# Patient Record
Sex: FEMALE | Race: OTHER | HISPANIC OR LATINO | Employment: FULL TIME | URBAN - METROPOLITAN AREA
[De-identification: names, ages, dates, MRNs, and addresses within clinical notes are randomized per-mention and may not be internally consistent; named-entity substitution may affect disease eponyms.]

---

## 2018-09-23 ENCOUNTER — APPOINTMENT (EMERGENCY)
Dept: RADIOLOGY | Facility: HOSPITAL | Age: 33
End: 2018-09-23
Payer: COMMERCIAL

## 2018-09-23 ENCOUNTER — HOSPITAL ENCOUNTER (EMERGENCY)
Facility: HOSPITAL | Age: 33
Discharge: HOME | End: 2018-09-23
Attending: EMERGENCY MEDICINE | Admitting: EMERGENCY MEDICINE
Payer: COMMERCIAL

## 2018-09-23 VITALS
RESPIRATION RATE: 20 BRPM | OXYGEN SATURATION: 100 % | HEART RATE: 70 BPM | BODY MASS INDEX: 59.2 KG/M2 | WEIGHT: 293 LBS | DIASTOLIC BLOOD PRESSURE: 59 MMHG | TEMPERATURE: 97.5 F | SYSTOLIC BLOOD PRESSURE: 113 MMHG

## 2018-09-23 DIAGNOSIS — R60.0 LOWER EXTREMITY EDEMA: Primary | ICD-10-CM

## 2018-09-23 DIAGNOSIS — J06.9 UPPER RESPIRATORY INFECTION: ICD-10-CM

## 2018-09-23 PROCEDURE — 99284 EMERGENCY DEPT VISIT MOD MDM: CPT

## 2018-09-23 PROCEDURE — 96372 THER/PROPH/DIAG INJ SC/IM: CPT

## 2018-09-23 PROCEDURE — 71046 X-RAY EXAM CHEST 2 VIEWS: CPT

## 2018-09-23 RX ORDER — BENZONATATE 100 MG/1
100 CAPSULE ORAL 3 TIMES DAILY PRN
Qty: 20 CAPSULE | Refills: 0 | Status: SHIPPED | OUTPATIENT
Start: 2018-09-23 | End: 2018-09-30

## 2018-09-23 RX ADMIN — ENOXAPARIN SODIUM 100 MG: 100 INJECTION SUBCUTANEOUS at 15:17

## 2018-09-23 RX ADMIN — ENOXAPARIN SODIUM 40 MG: 40 INJECTION SUBCUTANEOUS at 15:17

## 2018-09-23 NOTE — DISCHARGE INSTRUCTIONS
Leg Edema   WHAT YOU NEED TO KNOW:   Leg edema is swelling caused by fluid buildup  Your legs may swell if you sit or stand for long periods of time, are pregnant, or are injured  Swelling may also occur if you have heart failure or circulation problems  This means that your heart does not pump blood through your body as it should  DISCHARGE INSTRUCTIONS:   Self-care:   · Elevate your legs:  Raise your legs above the level of your heart as often as you can  This will help decrease swelling and pain  Prop your legs on pillows or blankets to keep them elevated comfortably  · Wear pressure stockings: These tight stockings put pressure on your legs to promote blood flow and prevent blood clots  Wear the stockings during the day  Do not wear them while you sleep  · Apply heat:  Heat helps decrease pain and swelling  Apply heat on the area for 20 to 30 minutes every 2 hours for as many days as directed  · Stay active:  Do not stand or sit for long periods of time  Ask your healthcare provider about the best exercise plan for you  · Eat healthy foods:  Healthy foods include fruits, vegetables, whole-grain breads, low-fat dairy products, beans, lean meats, and fish  Ask if you need to be on a special diet  Limit salt  Salt will make your body hold even more fluid  Follow up with your healthcare provider as directed:  Write down your questions so you remember to ask them during your visits  Contact your healthcare provider if:   · You have a fever or feel more tired than usual     · The veins in your legs look larger than usual  They may look full or bulging  · Your legs itch or feel heavy  · You have red or white areas or sores on your legs  The skin may also appear dimpled or have indentations  · You are gaining weight  · You have trouble moving your ankles  · The swelling does not go away, or other parts of your body swell      · You have questions or concerns about your condition or care   Return to the emergency department if:   · You cannot walk  · You feel faint or confused  · Your skin turns blue or gray  · Your leg feels warm, tender, and painful  It may be swollen and red  · You have chest pain or trouble breathing that is worse when you lie down  · You suddenly feel lightheaded and have trouble breathing  · You have new and sudden chest pain  You may have more pain when you take deep breaths or cough  You may also cough up blood  © 2017 [employer]  Information is for End User's use only and may not be sold, redistributed or otherwise used for commercial purposes  All illustrations and images included in CareNotes® are the copyrighted property of A [employer]  or Pradeep Bojorquez  The above information is an  only  It is not intended as medical advice for individual conditions or treatments  Talk to your doctor, nurse or pharmacist before following any medical regimen to see if it is safe and effective for you  Upper Respiratory Infection   WHAT YOU NEED TO KNOW:   An upper respiratory infection is also called the common cold  It is an infection that can affect your nose, throat, ears, and sinuses  For healthy people, the common cold is usually not serious and does not need special treatment  Cold symptoms are usually worst for the first 3 to 5 days  Most people get better in 7 to 14 days  You may continue to cough for 2 to 3 weeks  Colds are caused by viruses and do not get better with antibiotics  DISCHARGE INSTRUCTIONS:   Return to the emergency department if:   · You have chest pain or trouble breathing  Contact your healthcare provider if:   · You have a fever over 102ºF (39°C)  · Your sore throat gets worse or you see white or yellow spots in your throat  · Your symptoms get worse after 3 to 5 days or your cold is not better in 14 days  · You have a rash anywhere on your skin      · You have large, tender lumps in your neck  · You have thick, green or yellow drainage from your nose  · You cough up thick yellow, green, or bloody mucus  · You have vomiting for more than 24 hours and cannot keep fluids down  · You have a bad earache  · You have questions or concerns about your condition or care  Medicines: You may need any of the following:  · Decongestants  help reduce nasal congestion and help you breathe more easily  If you take decongestant pills, they may make you feel restless or cause problems with your sleep  Do not use decongestant sprays for more than a few days  · Cough suppressants  help reduce coughing  Ask your healthcare provider which type of cough medicine is best for you  · NSAIDs , such as ibuprofen, help decrease swelling, pain, and fever  NSAIDs can cause stomach bleeding or kidney problems in certain people  If you take blood thinner medicine, always ask your healthcare provider if NSAIDs are safe for you  Always read the medicine label and follow directions  · Acetaminophen  decreases pain and fever  It is available without a doctor's order  Ask how much to take and how often to take it  Follow directions  Read the labels of all other medicines you are using to see if they also contain acetaminophen, or ask your doctor or pharmacist  Acetaminophen can cause liver damage if not taken correctly  Do not use more than 4 grams (4,000 milligrams) total of acetaminophen in one day  · Take your medicine as directed  Contact your healthcare provider if you think your medicine is not helping or if you have side effects  Tell him or her if you are allergic to any medicine  Keep a list of the medicines, vitamins, and herbs you take  Include the amounts, and when and why you take them  Bring the list or the pill bottles to follow-up visits  Carry your medicine list with you in case of an emergency    Follow up with your healthcare provider as directed:  Write down your questions so you remember to ask them during your visits  Self-care:   · Rest as much as possible  Slowly start to do more each day  · Drink more liquids as directed  Liquids will help thin and loosen mucus so you can cough it up  Liquids will also help prevent dehydration  Liquids that help prevent dehydration include water, fruit juice, and broth  Do not drink liquids that contain caffeine  Caffeine can increase your risk for dehydration  Ask your healthcare provider how much liquid to drink each day  · Soothe a sore throat  Gargle with warm salt water  This helps your sore throat feel better  Make salt water by dissolving ¼ teaspoon salt in 1 cup warm water  You may also suck on hard candy or throat lozenges  You may use a sore throat spray  · Use a humidifier or vaporizer  Use a cool mist humidifier or a vaporizer to increase air moisture in your home  This may make it easier for you to breathe and help decrease your cough  · Use saline nasal drops as directed  These help relieve congestion  · Apply petroleum-based jelly around the outside of your nostrils  This can decrease irritation from blowing your nose  · Do not smoke  Nicotine and other chemicals in cigarettes and cigars can make your symptoms worse  They can also cause infections such as bronchitis or pneumonia  Ask your healthcare provider for information if you currently smoke and need help to quit  E-cigarettes or smokeless tobacco still contain nicotine  Talk to your healthcare provider before you use these products  Prevent spreading your cold to others:   · Try to stay away from other people during the first 2 to 3 days of your cold when it is more easily spread  · Do not share food or drinks  · Do not share hand towels with household members  · Wash your hands often, especially after you blow your nose  Turn away from other people and cover your mouth and nose with a tissue when you sneeze or cough         © 2017 [facility] Information is for End User's use only and may not be sold, redistributed or otherwise used for commercial purposes  All illustrations and images included in CareNotes® are the copyrighted property of A [employer] M , [employer]  or Pradeep Bojorquez  The above information is an  only  It is not intended as medical advice for individual conditions or treatments  Talk to your doctor, nurse or pharmacist before following any medical regimen to see if it is safe and effective for you

## 2018-09-23 NOTE — ED PROVIDER NOTES
History  Chief Complaint   Patient presents with    Cough     productive brown, right foot swelling and pain in left back and hand pain      47-year-old female presents to the emergency department with complaints of upper respiratory symptoms  States she has had a persistent cough with yellow and brown sputum over the past several days  Denies shortness of breath or chest pain  Also notes a sore throat and nasal congestion  Not currently taking anything over-the-counter for her symptoms  Also with concerns of right-sided foot swelling  States that her right foot and lower extremity have been intermittently swollen over the past month  States that symptoms sometimes seem to be better in the morning  Denies injury to this extremity  History provided by:  Patient   used: No    Cough   Cough characteristics:  Productive  Sputum characteristics:  Yellow and brown  Severity:  Mild  Onset quality:  Gradual  Duration:  2 days  Timing:  Intermittent  Progression:  Waxing and waning  Chronicity:  New  Smoker: yes    Context: upper respiratory infection    Context: not animal exposure, not exposure to allergens, not fumes, not occupational exposure, not sick contacts, not smoke exposure, not weather changes and not with activity    Relieved by:  Nothing  Ineffective treatments:  None tried  Associated symptoms: no chest pain, no chills, no diaphoresis, no ear fullness, no ear pain, no eye discharge, no fever, no headaches, no myalgias, no rash, no rhinorrhea, no shortness of breath, no sinus congestion, no sore throat, no weight loss and no wheezing        None       History reviewed  No pertinent past medical history  Past Surgical History:   Procedure Laterality Date     SECTION      CHOLECYSTECTOMY      TONSILLECTOMY         History reviewed  No pertinent family history  I have reviewed and agree with the history as documented      Social History   Substance Use Topics    Smoking status: Current Every Day Smoker     Packs/day: 0 50     Types: Cigarettes    Smokeless tobacco: Never Used    Alcohol use Yes      Comment: occassionally        Review of Systems   Constitutional: Negative for activity change, appetite change, chills, diaphoresis, fever and weight loss  HENT: Positive for congestion  Negative for dental problem, drooling, ear discharge, ear pain, mouth sores, nosebleeds, rhinorrhea, sore throat and trouble swallowing  Eyes: Negative for pain, discharge and itching  Respiratory: Positive for cough  Negative for chest tightness, shortness of breath and wheezing  Cardiovascular: Negative for chest pain and palpitations  Gastrointestinal: Negative for abdominal pain, blood in stool, constipation, diarrhea, nausea and vomiting  Endocrine: Negative for cold intolerance and heat intolerance  Genitourinary: Negative for difficulty urinating, dysuria, flank pain, frequency and urgency  Musculoskeletal: Negative for myalgias  Foot and leg swelling   Skin: Negative for rash and wound  Allergic/Immunologic: Negative for food allergies and immunocompromised state  Neurological: Negative for dizziness, seizures, syncope, weakness, numbness and headaches  Psychiatric/Behavioral: Negative for agitation, behavioral problems and confusion  Physical Exam  Physical Exam   Constitutional: She is oriented to person, place, and time  She appears well-developed and well-nourished  No distress  HENT:   Head: Normocephalic and atraumatic  Right Ear: External ear normal    Left Ear: External ear normal    Mouth/Throat: Oropharynx is clear and moist  No oropharyngeal exudate  Eyes: Conjunctivae are normal    Neck: No JVD present  No tracheal deviation present  Cardiovascular: Normal rate, regular rhythm and normal heart sounds  Exam reveals no gallop and no friction rub  No murmur heard    Pulmonary/Chest: Effort normal and breath sounds normal  No respiratory distress  She has no wheezes  She has no rales  She exhibits no tenderness  Abdominal: Soft  Bowel sounds are normal  She exhibits no distension  There is no tenderness  There is no guarding  Musculoskeletal: Normal range of motion  She exhibits no tenderness or deformity  Right lower leg: She exhibits swelling and edema (trace)  She exhibits no tenderness, no bony tenderness, no deformity and no laceration  Lymphadenopathy:     She has no cervical adenopathy  Neurological: She is alert and oriented to person, place, and time  Skin: Skin is warm and dry  No rash noted  She is not diaphoretic  No erythema  Psychiatric: She has a normal mood and affect  Her behavior is normal    Nursing note and vitals reviewed        Vital Signs  ED Triage Vitals   Temperature Pulse Respirations Blood Pressure SpO2   09/23/18 1214 09/23/18 1214 09/23/18 1214 09/23/18 1227 09/23/18 1214   97 5 °F (36 4 °C) 70 20 113/59 100 %      Temp Source Heart Rate Source Patient Position - Orthostatic VS BP Location FiO2 (%)   09/23/18 1214 09/23/18 1214 09/23/18 1214 09/23/18 1214 --   Temporal Monitor Sitting Left arm       Pain Score       09/23/18 1214       8           Vitals:    09/23/18 1214 09/23/18 1227   BP:  113/59   Pulse: 70    Patient Position - Orthostatic VS: Sitting        Visual Acuity      ED Medications  Medications   enoxaparin (LOVENOX) subcutaneous injection 100 mg (not administered)   enoxaparin (LOVENOX) subcutaneous injection 40 mg (not administered)       Diagnostic Studies  Results Reviewed     None                 XR chest 2 views   ED Interpretation by Jenny Santillan PA-C (09/23 1327)   No focal consolidation      VAS follow up    (Results Pending)              Procedures  Procedures       Phone Contacts  ED Phone Contact    ED Course                               MDM  Number of Diagnoses or Management Options  Lower extremity edema:   Upper respiratory infection:   Diagnosis management comments: Differential diagnosis includes but not limited to:  Upper respiratory infection, bronchitis, pneumonia, dependent edema  DVT less likely  Amount and/or Complexity of Data Reviewed  Tests in the radiology section of CPT®: ordered and reviewed  Independent visualization of images, tracings, or specimens: yes      CritCare Time    Disposition  Final diagnoses:   Lower extremity edema   Upper respiratory infection     Time reflects when diagnosis was documented in both MDM as applicable and the Disposition within this note     Time User Action Codes Description Comment    9/23/2018  1:45 PM Shannon Ocasio Add [R60 0] Lower extremity edema     9/23/2018  1:46 PM Willie VILLEGAS Add [J06 9] Upper respiratory infection       ED Disposition     ED Disposition Condition Comment    Discharge  Soraya Smart discharge to home/self care  Condition at discharge: Stable        Follow-up Information     Follow up With Specialties Details Why [address] Schedule an appointment as soon as possible for a visit  [phone] [place] [address] 43             Patient's Medications   Discharge Prescriptions    BENZONATATE (TESSALON PERLES) 100 MG CAPSULE    Take 1 capsule (100 mg total) by mouth 3 (three) times a day as needed for cough for up to 7 days       Start Date: 9/23/2018 End Date: 9/30/2018       Order Dose: 100 mg       Quantity: 20 capsule    Refills: 0       Outpatient Discharge Orders  VAS follow up   Standing Status: Future  Standing Exp   Date: 09/26/22         ED Provider  Electronically Signed by           Dwayne Camarena PA-C  09/23/18 2480

## 2018-10-24 ENCOUNTER — HOSPITAL ENCOUNTER (EMERGENCY)
Facility: HOSPITAL | Age: 33
Discharge: HOME | End: 2018-10-24
Attending: EMERGENCY MEDICINE | Admitting: EMERGENCY MEDICINE
Payer: COMMERCIAL

## 2018-10-24 VITALS
TEMPERATURE: 97.2 F | WEIGHT: 293 LBS | HEIGHT: 60 IN | BODY MASS INDEX: 57.52 KG/M2 | DIASTOLIC BLOOD PRESSURE: 70 MMHG | OXYGEN SATURATION: 98 % | HEART RATE: 78 BPM | RESPIRATION RATE: 16 BRPM | SYSTOLIC BLOOD PRESSURE: 133 MMHG

## 2018-10-24 DIAGNOSIS — S29.019A STRAIN OF THORACIC REGION, INITIAL ENCOUNTER: ICD-10-CM

## 2018-10-24 DIAGNOSIS — M62.830 SPASM OF THORACIC BACK MUSCLE: ICD-10-CM

## 2018-10-24 DIAGNOSIS — J40 BRONCHITIS: Primary | ICD-10-CM

## 2018-10-24 PROCEDURE — 99282 EMERGENCY DEPT VISIT SF MDM: CPT

## 2018-10-24 RX ORDER — ALBUTEROL SULFATE 90 UG/1
2 AEROSOL, METERED RESPIRATORY (INHALATION) EVERY 4 HOURS PRN
Qty: 1 INHALER | Refills: 0 | Status: SHIPPED | OUTPATIENT
Start: 2018-10-24 | End: 2020-08-25 | Stop reason: SDUPTHER

## 2018-10-24 RX ORDER — AZITHROMYCIN 250 MG/1
TABLET, FILM COATED ORAL
Qty: 6 TABLET | Refills: 0 | Status: SHIPPED | OUTPATIENT
Start: 2018-10-24 | End: 2018-10-28

## 2018-10-24 RX ORDER — BACLOFEN 10 MG/1
10 TABLET ORAL ONCE
Status: COMPLETED | OUTPATIENT
Start: 2018-10-24 | End: 2018-10-24

## 2018-10-24 RX ORDER — BACLOFEN 10 MG/1
10 TABLET ORAL 3 TIMES DAILY
Qty: 15 TABLET | Refills: 0 | Status: SHIPPED | OUTPATIENT
Start: 2018-10-24 | End: 2020-08-25 | Stop reason: ALTCHOICE

## 2018-10-24 RX ADMIN — BACLOFEN 10 MG: 10 TABLET ORAL at 22:48

## 2018-10-25 NOTE — ED PROVIDER NOTES
History  Chief Complaint   Patient presents with    Nasal Congestion     pt states shes been experiencing chest and nasal congestion for two weeks  Pt states she also may have pulled a muscle in her side and would like to have that looked at too  26 old female presenting with cough for 3 weeks and nasal congestion  Patient reports using sisters Advair Diskus at home with relief  She reports she has been having a productive cough of green sputum but denies hemoptysis  Denies any shortness breath or chest pain  Denies any abdominal pain nausea vomiting diarrhea fevers chills or sweats  In addition 3 days ago patient reports she was trying to lift her 400 lb mother up out of the wheelchair and felt a sharp pain in her back at the thoracic level  Patient reports trying Motrin and Tylenol at home without relief  She denies anything making the pain better and states she can never find a comfortable position at night to sleep which makes the pain worse  None       History reviewed  No pertinent past medical history  Past Surgical History:   Procedure Laterality Date     SECTION      five    CHOLECYSTECTOMY      TONSILLECTOMY         History reviewed  No pertinent family history  I have reviewed and agree with the history as documented  Social History   Substance Use Topics    Smoking status: Current Every Day Smoker     Packs/day: 0 50     Types: Cigarettes    Smokeless tobacco: Never Used    Alcohol use Yes      Comment: occassionally        Review of Systems   All other systems reviewed and are negative  Physical Exam  Physical Exam   Constitutional: She is oriented to person, place, and time  She appears well-developed and well-nourished  No distress  HENT:   Head: Normocephalic and atraumatic  Right Ear: External ear normal    Left Ear: External ear normal    Mouth/Throat: Oropharynx is clear and moist  No oropharyngeal exudate     Clear nasal discharge   Eyes: Conjunctivae are normal    EOM grossly intact   Neck: Normal range of motion  Neck supple  No JVD present  Cardiovascular: Normal rate  Pulmonary/Chest: Effort normal and breath sounds normal    No wheezing, good air movement   Abdominal: Soft  Musculoskeletal:        Arms:  FROM, steady gait, cap refill brisk, strength and sensation grossly intact throughout   Lymphadenopathy:     She has no cervical adenopathy  Neurological: She is alert and oriented to person, place, and time  Skin: Skin is warm and dry  Capillary refill takes less than 2 seconds  Psychiatric: She has a normal mood and affect  Her behavior is normal    Nursing note and vitals reviewed        Vital Signs  ED Triage Vitals [10/24/18 2215]   Temperature Pulse Respirations Blood Pressure SpO2   (!) 97 2 °F (36 2 °C) 78 16 133/70 98 %      Temp Source Heart Rate Source Patient Position - Orthostatic VS BP Location FiO2 (%)   Tympanic Monitor Sitting Right arm --      Pain Score       --           Vitals:    10/24/18 2215   BP: 133/70   Pulse: 78   Patient Position - Orthostatic VS: Sitting       Visual Acuity      ED Medications  Medications   baclofen tablet 10 mg (10 mg Oral Given 10/24/18 2248)       Diagnostic Studies  Results Reviewed     None                 No orders to display              Procedures  Procedures       Phone Contacts  ED Phone Contact    ED Course                               MDM  Number of Diagnoses or Management Options  Bronchitis:   Spasm of thoracic back muscle:   Strain of thoracic region, initial encounter:   Diagnosis management comments: 38-year-old presenting with cough for 3 weeks nasal congestion, will treat for bronchitis with azithromycin and sent home with albuterol inhalers patient found relief with Advair Diskus at home, patient no respiratory distress no wheezing on physical exam she appears well and nontoxic  Patient also complaining with right thoracic muscle strain/spasm, instructed patient to continue Motrin Tylenol home or get back within as needed to take at night for relief, will need to follow up with pcp and ortho     strict return to ED precautions discussed  Pt verbalizes understanding and agrees with plan  Pt is stable for discharge    Portions of the record may have been created with voice recognition software  Occasional wrong word or "sound a like" substitutions may have occurred due to the inherent limitations of voice recognition software  Read the chart carefully and recognize, using context, where substitutions have occurred  CritCare Time    Disposition  Final diagnoses:   Bronchitis   Strain of thoracic region, initial encounter   Spasm of thoracic back muscle     Time reflects when diagnosis was documented in both MDM as applicable and the Disposition within this note     Time User Action Codes Description Comment    10/24/2018 10:40 PM Madelaine Smart Add [J40] Bronchitis     10/24/2018 10:40 PM Madelaine Smart Add [S29 019A] Strain of thoracic region, initial encounter     10/24/2018 10:40 PM Madelaine Smart Add [M62 830] Spasm of thoracic back muscle       ED Disposition     ED Disposition Condition Comment    Discharge  Raisa Xiong discharge to home/self care      Condition at discharge: Good        Follow-up Information     Follow up With Specialties Details Why Contact Info    Mo Oneil MD Family Medicine Schedule an appointment as soon as possible for a visit As needed [address]            Discharge Medication List as of 10/24/2018 10:42 PM      START taking these medications    Details   albuterol (PROVENTIL HFA,VENTOLIN HFA) 90 mcg/act inhaler Inhale 2 puffs every 4 (four) hours as needed for wheezing, Starting Wed 10/24/2018, Print      azithromycin (ZITHROMAX) 250 mg tablet Take 2 tablets today then 1 tablet daily x 4 days, Print      baclofen 10 mg tablet Take 1 tablet (10 mg total) by mouth 3 (three) times a day, Starting Wed 10/24/2018, Print           No discharge procedures on file      ED Provider  Electronically Signed by           Augusto Gagnon PA-C  10/24/18 1012

## 2018-10-25 NOTE — DISCHARGE INSTRUCTIONS
Acute Bronchitis   WHAT YOU NEED TO KNOW:   Acute bronchitis is swelling and irritation in the air passages of your lungs  This irritation may cause you to cough or have other breathing problems  Acute bronchitis often starts because of another illness, such as a cold or the flu  The illness spreads from your nose and throat to your windpipe and airways  Bronchitis is often called a chest cold  Acute bronchitis lasts about 3 to 6 weeks and is usually not a serious illness  Your cough can last for several weeks  DISCHARGE INSTRUCTIONS:   Return to the emergency department if:   · You cough up blood  · Your lips or fingernails turn blue  · You feel like you are not getting enough air when you breathe  Contact your healthcare provider if:   · You have a fever  · Your breathing problems do not go away or get worse  · Your cough does not get better within 4 weeks  · You have questions or concerns about your condition or care  Self-care:   · Get more rest   Rest helps your body to heal  Slowly start to do more each day  Rest when you feel it is needed  · Avoid irritants in the air  Avoid chemicals, fumes, and dust  Wear a face mask if you must work around dust or fumes  Stay inside on days when air pollution levels are high  If you have allergies, stay inside when pollen counts are high  Do not use aerosol products, such as spray-on deodorant, bug spray, and hair spray  · Do not smoke or be around others who smoke  Nicotine and other chemicals in cigarettes and cigars damages the cilia that move mucus out of your lungs  Ask your healthcare provider for information if you currently smoke and need help to quit  E-cigarettes or smokeless tobacco still contain nicotine  Talk to your healthcare provider before you use these products  · Drink liquids as directed  Liquids help keep your air passages moist and help you cough up mucus   You may need to drink more liquids when you have acute bronchitis  Ask how much liquid to drink each day and which liquids are best for you  · Use a humidifier or vaporizer  Use a cool mist humidifier or a vaporizer to increase air moisture in your home  This may make it easier for you to breathe and help decrease your cough  Decrease risk for acute bronchitis:   · Get the vaccinations you need  Ask your healthcare provider if you should get vaccinated against the flu or pneumonia  · Prevent the spread of germs  You can decrease your risk of acute bronchitis and other illnesses by doing the following:     [facility] [place] [facility] your hands often with soap and water  Carry germ-killing hand lotion or gel with you  You can use the lotion or gel to clean your hands when soap and water are not available  ¨ Do not touch your eyes, nose, or mouth unless you have washed your hands first     ¨ Always cover your mouth when you cough to prevent the spread of germs  It is best to cough into a tissue or your shirt sleeve instead of into your hand  Ask those around you cover their mouths when they cough  ¨ Try to avoid people who have a cold or the flu  If you are sick, stay away from others as much as possible  Medicines: Your healthcare provider may  give you any of the following:  · Ibuprofen or acetaminophen  are medicines that help lower your fever  They are available without a doctor's order  Ask your healthcare provider which medicine is right for you  Ask how much to take and how often to take it  Follow directions  These medicines can cause stomach bleeding if not taken correctly  Ibuprofen can cause kidney damage  Do not take ibuprofen if you have kidney disease, an ulcer, or allergies to aspirin  Acetaminophen can cause liver damage  Do not take more than 4,000 milligrams in 24 hours  · Decongestants  help loosen mucus in your lungs and make it easier to cough up  This can help you breathe easier  · Cough suppressants  decrease your urge to cough   If your cough produces mucus, do not take a cough suppressant unless your healthcare provider tells you to  Your healthcare provider may suggest that you take a cough suppressant at night so you can rest     · Inhalers  may be given  Your healthcare provider may give you one or more inhalers to help you breathe easier and cough less  An inhaler gives your medicine to open your airways  Ask your healthcare provider to show you how to use your inhaler correctly  · Take your medicine as directed  Contact your healthcare provider if you think your medicine is not helping or if you have side effects  Tell him of her if you are allergic to any medicine  Keep a list of the medicines, vitamins, and herbs you take  Include the amounts, and when and why you take them  Bring the list or the pill bottles to follow-up visits  Carry your medicine list with you in case of an emergency  Follow up with your healthcare provider as directed:  Write down questions you have so you will remember to ask them during your follow-up visits  © 2017 [address] [employer] Information is for End User's use only and may not be sold, redistributed or otherwise used for commercial purposes  All illustrations and images included in CareNotes® are the copyrighted property of [employer]  or Pradeep Bojorquez  The above information is an  only  It is not intended as medical advice for individual conditions or treatments  Talk to your doctor, nurse or pharmacist before following any medical regimen to see if it is safe and effective for you  Muscle Spasm   WHAT YOU NEED TO KNOW:   A muscle spasm is a sudden contraction of any muscle or group of muscles  A muscle cramp is a painful muscle spasm  Muscle cramps commonly occur after intense exercise or during pregnancy  They may also be caused by certain medications, dehydration, low calcium or magnesium levels, or another medical condition     DISCHARGE INSTRUCTIONS: Medicines: You may need the following:  · NSAIDs  help decrease swelling and pain or fever  This medicine is available with or without a doctor's order  NSAIDs can cause stomach bleeding or kidney problems in certain people  If you take blood thinner medicine, always ask your healthcare provider if NSAIDs are safe for you  Always read the medicine label and follow directions  · Take your medicine as directed  Contact your healthcare provider if you think your medicine is not helping or if you have side effects  Tell him of her if you are allergic to any medicine  Keep a list of the medicines, vitamins, and herbs you take  Include the amounts, and when and why you take them  Bring the list or the pill bottles to follow-up visits  Carry your medicine list with you in case of an emergency  Follow up with your healthcare provider as directed: You may need other tests or treatment  You may also be referred to a physical therapist or other specialist  Write down your questions so you remember to ask them during your visits  Self-care:   · Stretch  your muscle to help relieve the cramp  It may be helpful to keep your muscle in the stretched position until the cramp is gone  · Apply heat  to help decrease pain and muscle spasms  Apply heat on the area for 20 to 30 minutes every 2 hours for as many days as directed  · Apply ice  to help decrease swelling and pain  Ice may also help prevent tissue damage  Use an ice pack, or put crushed ice in a plastic bag  Cover it with a towel and place it on your muscle for 15 to 20 minutes every hour or as directed  · Drink more liquids  to help prevent muscle cramps caused by dehydration  Sports drinks may help replace electrolytes you lose through sweat during exercise  Ask your healthcare provider how much liquid to drink each day and which liquids are best for you       · Eat healthy foods , such as fruits, vegetables, whole grains, low-fat dairy products, and lean proteins (meat, beans, and fish)  If you are pregnant, ask your healthcare provider about foods that are high in magnesium and sodium  They may help to relieve cramps during pregnancy  · Massage your muscle  to help relieve the cramp  · Take frequent deep breaths  until the cramp feels better  Lie down while you take the deep breaths so you do not get dizzy or lightheaded  Contact your healthcare provider if:   · You have signs of dehydration, such as a headache, dark yellow urine, dry eyes or mouth, or a fast heartbeat  · You have questions or concerns about your condition or care  Return to the emergency department if:   · You have warmth, swelling, or redness in the cramping muscle  · You have frequent or unrelieved muscle cramps in several different muscles  · You have muscle cramps with numbness, tingling, and burning in your hands and feet  © 2017 [facility] Information is for End User's use only and may not be sold, redistributed or otherwise used for commercial purposes  All illustrations and images included in CareNotes® are the copyrighted property of [employer]  or Pradeep Bojorquez  The above information is an  only  It is not intended as medical advice for individual conditions or treatments  Talk to your doctor, nurse or pharmacist before following any medical regimen to see if it is safe and effective for you

## 2019-06-01 ENCOUNTER — HOSPITAL ENCOUNTER (EMERGENCY)
Facility: HOSPITAL | Age: 34
Discharge: HOME | End: 2019-06-01
Attending: EMERGENCY MEDICINE
Payer: COMMERCIAL

## 2019-06-01 VITALS
BODY MASS INDEX: 58.22 KG/M2 | OXYGEN SATURATION: 98 % | TEMPERATURE: 98 F | DIASTOLIC BLOOD PRESSURE: 70 MMHG | HEART RATE: 86 BPM | SYSTOLIC BLOOD PRESSURE: 124 MMHG | WEIGHT: 293 LBS | RESPIRATION RATE: 18 BRPM

## 2019-06-01 DIAGNOSIS — S41.151A DOG BITE OF ARM, RIGHT, INITIAL ENCOUNTER: Primary | ICD-10-CM
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
## 2019-06-01 PROCEDURE — 99283 EMERGENCY DEPT VISIT LOW MDM: CPT | Performed by: PHYSICIAN ASSISTANT

## 2019-06-01 PROCEDURE — 90715 TDAP VACCINE 7 YRS/> IM: CPT | Performed by: PHYSICIAN ASSISTANT

## 2019-06-01 PROCEDURE — 99283 EMERGENCY DEPT VISIT LOW MDM: CPT

## 2019-06-01 RX ORDER — IBUPROFEN 400 MG/1
800 TABLET ORAL EVERY 6 HOURS PRN
Qty: 12 TABLET | Refills: 0 | Status: SHIPPED | OUTPATIENT
Start: 2019-06-01 | End: 2020-08-25 | Stop reason: ALTCHOICE

## 2019-06-01 RX ORDER — BACITRACIN, NEOMYCIN, POLYMYXIN B 400; 3.5; 5 [USP'U]/G; MG/G; [USP'U]/G
1 OINTMENT TOPICAL ONCE
Status: COMPLETED | OUTPATIENT
Start: 2019-06-01 | End: 2019-06-01

## 2019-06-01 RX ORDER — AMOXICILLIN AND CLAVULANATE POTASSIUM 875; 125 MG/1; MG/1
1 TABLET, FILM COATED ORAL EVERY 12 HOURS SCHEDULED
Qty: 20 TABLET | Refills: 0 | Status: SHIPPED | OUTPATIENT
Start: 2019-06-01 | End: 2019-06-11

## 2019-06-01 RX ORDER — AMOXICILLIN AND CLAVULANATE POTASSIUM 875; 125 MG/1; MG/1
1 TABLET, FILM COATED ORAL ONCE
Status: COMPLETED | OUTPATIENT
Start: 2019-06-01 | End: 2019-06-01

## 2019-06-01 RX ADMIN — AMOXICILLIN AND CLAVULANATE POTASSIUM 1 TABLET: 875; 125 TABLET, FILM COATED ORAL at 19:24

## 2019-06-01 RX ADMIN — TETANUS TOXOID, REDUCED DIPHTHERIA TOXOID AND ACELLULAR PERTUSSIS VACCINE, ADSORBED 0.5 ML: 5; 2.5; 8; 8; 2.5 SUSPENSION INTRAMUSCULAR at 19:23

## 2019-06-01 RX ADMIN — BACITRACIN ZINC, NEOMYCIN SULFATE, POLYMYXIN B SULFATE 1 SMALL APPLICATION: 400; 3.5; 5 OINTMENT TOPICAL at 19:26

## 2019-06-26 ENCOUNTER — HOSPITAL ENCOUNTER (EMERGENCY)
Facility: HOSPITAL | Age: 34
Discharge: HOME | End: 2019-06-26
Attending: EMERGENCY MEDICINE
Payer: COMMERCIAL

## 2019-06-26 ENCOUNTER — APPOINTMENT (EMERGENCY)
Dept: CT IMAGING | Facility: HOSPITAL | Age: 34
End: 2019-06-26
Payer: COMMERCIAL

## 2019-06-26 ENCOUNTER — APPOINTMENT (EMERGENCY)
Dept: ULTRASOUND IMAGING | Facility: HOSPITAL | Age: 34
End: 2019-06-26
Payer: COMMERCIAL

## 2019-06-26 VITALS
HEART RATE: 55 BPM | OXYGEN SATURATION: 97 % | BODY MASS INDEX: 56.92 KG/M2 | RESPIRATION RATE: 18 BRPM | SYSTOLIC BLOOD PRESSURE: 97 MMHG | DIASTOLIC BLOOD PRESSURE: 55 MMHG | WEIGHT: 291.45 LBS | TEMPERATURE: 98 F

## 2019-06-26 DIAGNOSIS — R10.9 NONSPECIFIC ABDOMINAL PAIN: Primary | ICD-10-CM

## 2019-06-26 LAB
ALBUMIN SERPL BCP-MCNC: 3.5 G/DL (ref 3.5–5)
ALP SERPL-CCNC: 116 U/L (ref 46–116)
ALT SERPL W P-5'-P-CCNC: 21 U/L (ref 12–78)
ANION GAP SERPL CALCULATED.3IONS-SCNC: 8 MMOL/L (ref 4–13)
AST SERPL W P-5'-P-CCNC: 18 U/L (ref 5–45)
BASOPHILS # BLD AUTO: 0.03 THOUSANDS/ΜL (ref 0–0.1)
BASOPHILS NFR BLD AUTO: 0 % (ref 0–1)
BILIRUB SERPL-MCNC: 0.31 MG/DL (ref 0.2–1)
BILIRUB UR QL STRIP: NEGATIVE
BUN SERPL-MCNC: 11 MG/DL (ref 5–25)
CALCIUM SERPL-MCNC: 9 MG/DL (ref 8.3–10.1)
CHLORIDE SERPL-SCNC: 104 MMOL/L (ref 100–108)
CLARITY UR: CLEAR
CO2 SERPL-SCNC: 26 MMOL/L (ref 21–32)
COLOR UR: YELLOW
COLOR, POC: YELLOW
CREAT SERPL-MCNC: 0.56 MG/DL (ref 0.6–1.3)
EOSINOPHIL # BLD AUTO: 0.08 THOUSAND/ΜL (ref 0–0.61)
EOSINOPHIL NFR BLD AUTO: 1 % (ref 0–6)
ERYTHROCYTE [DISTWIDTH] IN BLOOD BY AUTOMATED COUNT: 14.6 % (ref 11.6–15.1)
EXT PREG TEST URINE: NEGATIVE
EXT. CONTROL ED NAV: NORMAL
GFR SERPL CREATININE-BSD FRML MDRD: 120 ML/MIN/1.73SQ M
GLUCOSE SERPL-MCNC: 86 MG/DL (ref 65–140)
GLUCOSE UR STRIP-MCNC: NEGATIVE MG/DL
HCT VFR BLD AUTO: 43.6 % (ref 34.8–46.1)
HGB BLD-MCNC: 14.4 G/DL (ref 11.5–15.4)
HGB UR QL STRIP.AUTO: NEGATIVE
IMM GRANULOCYTES # BLD AUTO: 0.03 THOUSAND/UL (ref 0–0.2)
IMM GRANULOCYTES NFR BLD AUTO: 0 % (ref 0–2)
KETONES UR STRIP-MCNC: NEGATIVE MG/DL
LEUKOCYTE ESTERASE UR QL STRIP: NEGATIVE
LIPASE SERPL-CCNC: 135 U/L (ref 73–393)
LYMPHOCYTES # BLD AUTO: 2.58 THOUSANDS/ΜL (ref 0.6–4.47)
LYMPHOCYTES NFR BLD AUTO: 25 % (ref 14–44)
MCH RBC QN AUTO: 30.3 PG (ref 26.8–34.3)
MCHC RBC AUTO-ENTMCNC: 33 G/DL (ref 31.4–37.4)
MCV RBC AUTO: 92 FL (ref 82–98)
MONOCYTES # BLD AUTO: 0.66 THOUSAND/ΜL (ref 0.17–1.22)
MONOCYTES NFR BLD AUTO: 6 % (ref 4–12)
NEUTROPHILS # BLD AUTO: 7.17 THOUSANDS/ΜL (ref 1.85–7.62)
NEUTS SEG NFR BLD AUTO: 68 % (ref 43–75)
NITRITE UR QL STRIP: NEGATIVE
NRBC BLD AUTO-RTO: 0 /100 WBCS
PH UR STRIP.AUTO: 7 [PH] (ref 4.5–8)
PLATELET # BLD AUTO: 315 THOUSANDS/UL (ref 149–390)
PMV BLD AUTO: 11.4 FL (ref 8.9–12.7)
POTASSIUM SERPL-SCNC: 4.1 MMOL/L (ref 3.5–5.3)
PROT SERPL-MCNC: 7.3 G/DL (ref 6.4–8.2)
PROT UR STRIP-MCNC: NEGATIVE MG/DL
RBC # BLD AUTO: 4.76 MILLION/UL (ref 3.81–5.12)
SODIUM SERPL-SCNC: 138 MMOL/L (ref 136–145)
SP GR UR STRIP.AUTO: 1.02 (ref 1–1.03)
UROBILINOGEN UR QL STRIP.AUTO: 0.2 E.U./DL
WBC # BLD AUTO: 10.55 THOUSAND/UL (ref 4.31–10.16)

## 2019-06-26 PROCEDURE — 87591 N.GONORRHOEAE DNA AMP PROB: CPT | Performed by: EMERGENCY MEDICINE

## 2019-06-26 PROCEDURE — 85025 COMPLETE CBC W/AUTO DIFF WBC: CPT | Performed by: EMERGENCY MEDICINE

## 2019-06-26 PROCEDURE — 99284 EMERGENCY DEPT VISIT MOD MDM: CPT | Performed by: EMERGENCY MEDICINE

## 2019-06-26 PROCEDURE — 96374 THER/PROPH/DIAG INJ IV PUSH: CPT

## 2019-06-26 PROCEDURE — 99284 EMERGENCY DEPT VISIT MOD MDM: CPT

## 2019-06-26 PROCEDURE — 80053 COMPREHEN METABOLIC PANEL: CPT | Performed by: EMERGENCY MEDICINE

## 2019-06-26 PROCEDURE — 96361 HYDRATE IV INFUSION ADD-ON: CPT

## 2019-06-26 PROCEDURE — 36415 COLL VENOUS BLD VENIPUNCTURE: CPT | Performed by: EMERGENCY MEDICINE

## 2019-06-26 PROCEDURE — 74177 CT ABD & PELVIS W/CONTRAST: CPT

## 2019-06-26 PROCEDURE — 83690 ASSAY OF LIPASE: CPT | Performed by: EMERGENCY MEDICINE

## 2019-06-26 PROCEDURE — 87491 CHLMYD TRACH DNA AMP PROBE: CPT | Performed by: EMERGENCY MEDICINE

## 2019-06-26 PROCEDURE — 81025 URINE PREGNANCY TEST: CPT | Performed by: EMERGENCY MEDICINE

## 2019-06-26 PROCEDURE — 81003 URINALYSIS AUTO W/O SCOPE: CPT

## 2019-06-26 PROCEDURE — 96375 TX/PRO/DX INJ NEW DRUG ADDON: CPT

## 2019-06-26 RX ORDER — MORPHINE SULFATE 4 MG/ML
4 INJECTION, SOLUTION INTRAMUSCULAR; INTRAVENOUS ONCE
Status: COMPLETED | OUTPATIENT
Start: 2019-06-26 | End: 2019-06-26

## 2019-06-26 RX ORDER — NAPROXEN 500 MG/1
500 TABLET ORAL 2 TIMES DAILY WITH MEALS
Qty: 20 TABLET | Refills: 0 | Status: SHIPPED | OUTPATIENT
Start: 2019-06-26 | End: 2020-08-25 | Stop reason: ALTCHOICE

## 2019-06-26 RX ORDER — KETOROLAC TROMETHAMINE 30 MG/ML
30 INJECTION, SOLUTION INTRAMUSCULAR; INTRAVENOUS ONCE
Status: COMPLETED | OUTPATIENT
Start: 2019-06-26 | End: 2019-06-26

## 2019-06-26 RX ORDER — ONDANSETRON 4 MG/1
4 TABLET, FILM COATED ORAL EVERY 8 HOURS PRN
Qty: 12 TABLET | Refills: 0 | Status: SHIPPED | OUTPATIENT
Start: 2019-06-26 | End: 2020-08-25 | Stop reason: ALTCHOICE

## 2019-06-26 RX ORDER — ONDANSETRON 2 MG/ML
4 INJECTION INTRAMUSCULAR; INTRAVENOUS ONCE
Status: COMPLETED | OUTPATIENT
Start: 2019-06-26 | End: 2019-06-26

## 2019-06-26 RX ORDER — TRAMADOL HYDROCHLORIDE 50 MG/1
50 TABLET ORAL EVERY 6 HOURS PRN
Qty: 15 TABLET | Refills: 0 | Status: SHIPPED | OUTPATIENT
Start: 2019-06-26 | End: 2019-07-06

## 2019-06-26 RX ADMIN — KETOROLAC TROMETHAMINE 30 MG: 30 INJECTION, SOLUTION INTRAMUSCULAR; INTRAVENOUS at 11:23

## 2019-06-26 RX ADMIN — ONDANSETRON 4 MG: 2 INJECTION INTRAMUSCULAR; INTRAVENOUS at 11:22

## 2019-06-26 RX ADMIN — MORPHINE SULFATE 4 MG: 4 INJECTION INTRAVENOUS at 12:38

## 2019-06-26 RX ADMIN — IOHEXOL 100 ML: 350 INJECTION, SOLUTION INTRAVENOUS at 12:23

## 2019-06-26 RX ADMIN — SODIUM CHLORIDE 1000 ML: 0.9 INJECTION, SOLUTION INTRAVENOUS at 11:18

## 2019-06-27 LAB
C TRACH DNA SPEC QL NAA+PROBE: NEGATIVE
N GONORRHOEA DNA SPEC QL NAA+PROBE: NEGATIVE

## 2019-10-31 ENCOUNTER — APPOINTMENT (EMERGENCY)
Dept: RADIOLOGY | Facility: HOSPITAL | Age: 34
End: 2019-10-31
Payer: COMMERCIAL

## 2019-10-31 ENCOUNTER — HOSPITAL ENCOUNTER (EMERGENCY)
Facility: HOSPITAL | Age: 34
Discharge: HOME | End: 2019-10-31
Attending: EMERGENCY MEDICINE
Payer: COMMERCIAL

## 2019-10-31 VITALS
WEIGHT: 293 LBS | RESPIRATION RATE: 20 BRPM | DIASTOLIC BLOOD PRESSURE: 72 MMHG | SYSTOLIC BLOOD PRESSURE: 101 MMHG | HEART RATE: 93 BPM | BODY MASS INDEX: 58.81 KG/M2 | TEMPERATURE: 97.9 F | OXYGEN SATURATION: 96 %

## 2019-10-31 DIAGNOSIS — J45.901 ACUTE ASTHMA EXACERBATION: Primary | ICD-10-CM

## 2019-10-31 DIAGNOSIS — R05.8 COUGH PRESENT FOR GREATER THAN 3 WEEKS: ICD-10-CM

## 2019-10-31 PROCEDURE — 99285 EMERGENCY DEPT VISIT HI MDM: CPT

## 2019-10-31 PROCEDURE — 94640 AIRWAY INHALATION TREATMENT: CPT

## 2019-10-31 PROCEDURE — 99284 EMERGENCY DEPT VISIT MOD MDM: CPT | Performed by: EMERGENCY MEDICINE

## 2019-10-31 PROCEDURE — 71046 X-RAY EXAM CHEST 2 VIEWS: CPT

## 2019-10-31 RX ORDER — ALBUTEROL SULFATE 90 UG/1
2 AEROSOL, METERED RESPIRATORY (INHALATION) EVERY 4 HOURS PRN
Qty: 1 INHALER | Refills: 0 | Status: SHIPPED | OUTPATIENT
Start: 2019-10-31 | End: 2020-08-25 | Stop reason: SDUPTHER

## 2019-10-31 RX ORDER — PREDNISONE 20 MG/1
60 TABLET ORAL DAILY
Qty: 12 TABLET | Refills: 0 | Status: SHIPPED | OUTPATIENT
Start: 2019-11-01 | End: 2019-11-05

## 2019-10-31 RX ORDER — ALBUTEROL SULFATE 2.5 MG/3ML
5 SOLUTION RESPIRATORY (INHALATION) ONCE
Status: COMPLETED | OUTPATIENT
Start: 2019-10-31 | End: 2019-10-31

## 2019-10-31 RX ORDER — PREDNISONE 20 MG/1
60 TABLET ORAL ONCE
Status: COMPLETED | OUTPATIENT
Start: 2019-10-31 | End: 2019-10-31

## 2019-10-31 RX ORDER — ALBUTEROL SULFATE 2.5 MG/3ML
SOLUTION RESPIRATORY (INHALATION)
Status: COMPLETED
Start: 2019-10-31 | End: 2019-10-31

## 2019-10-31 RX ORDER — AZITHROMYCIN 250 MG/1
TABLET, FILM COATED ORAL
Qty: 6 TABLET | Refills: 0 | Status: SHIPPED | OUTPATIENT
Start: 2019-10-31 | End: 2019-11-04

## 2019-10-31 RX ORDER — SODIUM CHLORIDE FOR INHALATION 0.9 %
12 VIAL, NEBULIZER (ML) INHALATION ONCE
Status: COMPLETED | OUTPATIENT
Start: 2019-10-31 | End: 2019-10-31

## 2019-10-31 RX ADMIN — IPRATROPIUM BROMIDE 0.5 MG: 0.5 SOLUTION RESPIRATORY (INHALATION) at 10:38

## 2019-10-31 RX ADMIN — PREDNISONE 60 MG: 20 TABLET ORAL at 11:39

## 2019-10-31 RX ADMIN — ALBUTEROL SULFATE 10 MG: 2.5 SOLUTION RESPIRATORY (INHALATION) at 11:39

## 2019-10-31 RX ADMIN — ALBUTEROL SULFATE 5 MG: 2.5 SOLUTION RESPIRATORY (INHALATION) at 10:38

## 2019-10-31 RX ADMIN — ISODIUM CHLORIDE 12 ML: 0.03 SOLUTION RESPIRATORY (INHALATION) at 11:39

## 2019-10-31 RX ADMIN — Medication 0.5 MG: at 10:38

## 2019-10-31 RX ADMIN — IPRATROPIUM BROMIDE 1 MG: 0.5 SOLUTION RESPIRATORY (INHALATION) at 11:39

## 2019-10-31 NOTE — ED NOTES
Pt noted drinking coffee in triage  Unable to obtain oral temp at this time        Pheobe Goodpasture, BONITA  10/31/19 1038

## 2019-10-31 NOTE — ED PROVIDER NOTES
History  Chief Complaint   Patient presents with    Shortness of Breath     pt c/o SOB for 4 days, reports hx of asthma  SOB not relieved with albuterol inhaler at home  Also reporting cough, congestion and subjective fevers   Dizziness     pt c/o dizziness, generalized weakness, and blurry vision for "couple" of days   Headache     pt c/o headache for 5 days been taking tylenol and advil without relief  78-year-old female with a history of asthma presents for evaluation of 4 days of shortness of breath with associated wheezing and cough  The patient states that she has had a cough for approximately a month with thick sputum which has become yellow over the past couple days  The patient denies any associated fevers or chills  She has been using her albuterol inhaler without relief  The patient continues to smoke  Prior to Admission Medications   Prescriptions Last Dose Informant Patient Reported? Taking?    albuterol (PROVENTIL HFA,VENTOLIN HFA) 90 mcg/act inhaler 10/31/2019 at Unknown time  No Yes   Sig: Inhale 2 puffs every 4 (four) hours as needed for wheezing   baclofen 10 mg tablet Not Taking at Unknown time  No No   Sig: Take 1 tablet (10 mg total) by mouth 3 (three) times a day   Patient not taking: Reported on 6/1/2019   ibuprofen (MOTRIN) 400 mg tablet 10/30/2019 at Unknown time  No Yes   Sig: Take 2 tablets (800 mg total) by mouth every 6 (six) hours as needed for mild pain   naproxen (NAPROSYN) 500 mg tablet Not Taking at Unknown time  No No   Sig: Take 1 tablet (500 mg total) by mouth 2 (two) times a day with meals   Patient not taking: Reported on 10/31/2019   ondansetron (ZOFRAN) 4 mg tablet Not Taking at Unknown time  No No   Sig: Take 1 tablet (4 mg total) by mouth every 8 (eight) hours as needed for nausea   Patient not taking: Reported on 10/31/2019      Facility-Administered Medications: None       Past Medical History:   Diagnosis Date    Asthma        Past Surgical History:   Procedure Laterality Date     SECTION      five     SECTION      CHOLECYSTECTOMY      TONSILLECTOMY         History reviewed  No pertinent family history  I have reviewed and agree with the history as documented  Social History     Tobacco Use    Smoking status: Current Every Day Smoker     Packs/day: 0 50     Types: Cigarettes    Smokeless tobacco: Never Used   Substance Use Topics    Alcohol use: Yes     Comment: occassionally    Drug use: No        Review of Systems   Constitutional: Positive for chills  Negative for fever  HENT: Positive for congestion  Respiratory: Positive for cough, shortness of breath and wheezing  All other systems reviewed and are negative  Physical Exam  Physical Exam   Constitutional: She is oriented to person, place, and time  She appears well-developed and well-nourished  No distress  HENT:   Head: Normocephalic and atraumatic  Right Ear: External ear normal    Left Ear: External ear normal    Eyes: Pupils are equal, round, and reactive to light  Conjunctivae and EOM are normal  No scleral icterus  Neck: Normal range of motion  Cardiovascular: Normal rate, regular rhythm and normal heart sounds  Pulmonary/Chest: Effort normal  No accessory muscle usage  Tachypnea noted  No respiratory distress  She has wheezes  Abdominal: Soft  Bowel sounds are normal  There is no tenderness  There is no rebound and no guarding  Musculoskeletal: Normal range of motion  She exhibits no edema  Neurological: She is alert and oriented to person, place, and time  Skin: Skin is warm and dry  No rash noted  Psychiatric: She has a normal mood and affect  Nursing note and vitals reviewed        Vital Signs  ED Triage Vitals [10/31/19 1031]   Temperature Pulse Respirations Blood Pressure SpO2   97 9 °F (36 6 °C) 95 20 117/56 98 %      Temp Source Heart Rate Source Patient Position - Orthostatic VS BP Location FiO2 (%)   Temporal Monitor Sitting Right arm --      Pain Score       8           Vitals:    10/31/19 1031 10/31/19 1158 10/31/19 1332   BP: 117/56 125/72 101/72   Pulse: 95 84 93   Patient Position - Orthostatic VS: Sitting Lying Lying         Visual Acuity      ED Medications  Medications   albuterol inhalation solution 5 mg (5 mg Nebulization Given 10/31/19 1038)   ipratropium (ATROVENT) 0 02 % inhalation solution 0 5 mg (0 5 mg Nebulization Given 10/31/19 1038)   albuterol inhalation solution 10 mg (10 mg Nebulization Given 10/31/19 1139)   ipratropium (ATROVENT) 0 02 % inhalation solution 1 mg (1 mg Nebulization Given 10/31/19 1139)   sodium chloride 0 9 % inhalation solution 12 mL (12 mL Nebulization Given 10/31/19 1139)   predniSONE tablet 60 mg (60 mg Oral Given 10/31/19 1139)       Diagnostic Studies  Results Reviewed     None                 XR chest 2 views   ED Interpretation by Tyler Menchaca DO (10/31 1225)   ED Provider X-ray Interpretation      My X-ray interpretation of the Chest: was negative for infiltrate, effusion, pneumothorax, or wide mediastinum      Tyler Menchaca DO      Final Result by Teetee Fuentes MD (10/31 1430)      No acute cardiopulmonary disease  Workstation performed: [id number]                    Procedures  Procedures       ED Course                               MDM  Number of Diagnoses or Management Options  Acute asthma exacerbation: new and requires workup  Cough present for greater than 3 weeks: new and requires workup  Diagnosis management comments: Patient with bilateral expiratory wheezing on examination  The patient is able to speak in complete sentences however has diminished air sounds  I will also get chest x-ray to rule out pneumonia versus bronchitis as patient has cough greater than 3 weeks and new colored sputum production    The patient needs to establish with primary care doctor for management of her asthma as she has been getting refills on her albuterol through the emergency department  Amount and/or Complexity of Data Reviewed  Tests in the radiology section of CPT®: ordered and reviewed  Review and summarize past medical records: yes  Independent visualization of images, tracings, or specimens: yes        Disposition  Final diagnoses:   Acute asthma exacerbation   Cough present for greater than 3 weeks     Time reflects when diagnosis was documented in both MDM as applicable and the Disposition within this note     Time User Action Codes Description Comment    10/31/2019  1:20 PM Larinda Risk B Add [J45 901] Acute asthma exacerbation     10/31/2019  1:20 PM Larinda Risk B Add [R05] Cough present for greater than 3 weeks       ED Disposition     ED Disposition Condition Date/Time Comment    Discharge Stable Thu Oct 31, 2019  1:20 PM Yarely Lino discharge to home/self care  Follow-up Information     Follow up With Specialties Details Why Contact Info    Chloe Murcia MD Family Medicine Schedule an appointment as soon as possible for a visit in 1 week For further evaluation, if not improved [address]            Discharge Medication List as of 10/31/2019  1:21 PM      START taking these medications    Details   !! albuterol (PROVENTIL HFA,VENTOLIN HFA) 90 mcg/act inhaler Inhale 2 puffs every 4 (four) hours as needed for wheezing, Starting Thu 10/31/2019, Print      azithromycin (ZITHROMAX) 250 mg tablet Take 2 tablets today then 1 tablet daily x 4 days, Print       !! - Potential duplicate medications found  Please discuss with provider        CONTINUE these medications which have NOT CHANGED    Details   !! albuterol (PROVENTIL HFA,VENTOLIN HFA) 90 mcg/act inhaler Inhale 2 puffs every 4 (four) hours as needed for wheezing, Starting Wed 10/24/2018, Print      ibuprofen (MOTRIN) 400 mg tablet Take 2 tablets (800 mg total) by mouth every 6 (six) hours as needed for mild pain, Starting Sat 6/1/2019, Print      baclofen 10 mg tablet Take 1 tablet (10 mg total) by mouth 3 (three) times a day, Starting Wed 10/24/2018, Print      naproxen (NAPROSYN) 500 mg tablet Take 1 tablet (500 mg total) by mouth 2 (two) times a day with meals, Starting Wed 6/26/2019, Print      ondansetron (ZOFRAN) 4 mg tablet Take 1 tablet (4 mg total) by mouth every 8 (eight) hours as needed for nausea, Starting Wed 6/26/2019, Print       !! - Potential duplicate medications found  Please discuss with provider  No discharge procedures on file      ED Provider  Electronically Signed by           Chery Mariano DO  10/31/19 [id number]

## 2019-10-31 NOTE — ED NOTES
Patient speaking loudly on cell phone, asked to lower the volume as her conversation could be disruptive to other patients  Also advised patient that although she is permitted to talk on her cell phone, it would be in her best interest to focus on her breathing treatment instead, acknowledged understanding        Blanche Scott RN  10/31/19 3785

## 2020-02-18 ENCOUNTER — HOSPITAL ENCOUNTER (EMERGENCY)
Facility: HOSPITAL | Age: 35
Discharge: HOME | End: 2020-02-18
Attending: EMERGENCY MEDICINE
Payer: COMMERCIAL

## 2020-02-18 VITALS
TEMPERATURE: 98.2 F | DIASTOLIC BLOOD PRESSURE: 53 MMHG | HEART RATE: 77 BPM | RESPIRATION RATE: 12 BRPM | OXYGEN SATURATION: 94 % | BODY MASS INDEX: 62.09 KG/M2 | SYSTOLIC BLOOD PRESSURE: 99 MMHG | WEIGHT: 293 LBS

## 2020-02-18 DIAGNOSIS — J06.9 VIRAL URI WITH COUGH: Primary | ICD-10-CM

## 2020-02-18 PROCEDURE — 99283 EMERGENCY DEPT VISIT LOW MDM: CPT

## 2020-02-18 PROCEDURE — 99284 EMERGENCY DEPT VISIT MOD MDM: CPT | Performed by: PHYSICIAN ASSISTANT

## 2020-02-18 RX ORDER — LORATADINE 10 MG/1
10 TABLET ORAL DAILY
Qty: 30 TABLET | Refills: 0 | Status: SHIPPED | OUTPATIENT
Start: 2020-02-18

## 2020-02-18 RX ORDER — ALBUTEROL SULFATE 2.5 MG/3ML
2.5 SOLUTION RESPIRATORY (INHALATION) EVERY 6 HOURS PRN
COMMUNITY
Start: 2018-12-06

## 2020-02-18 RX ORDER — GUAIFENESIN/DEXTROMETHORPHAN 100-10MG/5
5 SYRUP ORAL 3 TIMES DAILY PRN
Qty: 118 ML | Refills: 0 | Status: SHIPPED | OUTPATIENT
Start: 2020-02-18 | End: 2020-02-28

## 2020-02-18 NOTE — ED PROVIDER NOTES
History  Chief Complaint   Patient presents with    Facial Pain     for about 1 month, more painful today than ever    Nasal Congestion     Patient is a 12-year-old female presents today for evaluation of nasal congestion, nonproductive coughing, sinus pressure, and postnasal drip which has been ongoing for approximately 4 days  Patient reports positive sick contacts as her daughters have the same symptoms  Patient denies any chest pain, shortness of breath, abdominal pain, nausea, vomiting, diarrhea  Patient reports she has been taking medication over-the-counter with no relief for symptoms  Patient reports she is up-to-date on vaccines including the influenza vaccine  History provided by:  Patient   used: No    URI   Presenting symptoms: congestion and rhinorrhea    Presenting symptoms: no ear pain, no fatigue, no fever and no sore throat    Severity:  Moderate  Onset quality:  Gradual  Duration:  4 days  Timing:  Constant  Progression:  Unchanged  Chronicity:  New  Relieved by:  Nothing  Worsened by:  Nothing  Ineffective treatments:  OTC medications  Associated symptoms: sneezing    Risk factors: sick contacts        Prior to Admission Medications   Prescriptions Last Dose Informant Patient Reported? Taking?    albuterol (2 5 mg/3 mL) 0 083 % nebulizer solution   Yes Yes   Sig: Inhale 2 5 mg every 6 (six) hours as needed   albuterol (PROVENTIL HFA,VENTOLIN HFA) 90 mcg/act inhaler   No No   Sig: Inhale 2 puffs every 4 (four) hours as needed for wheezing   albuterol (PROVENTIL HFA,VENTOLIN HFA) 90 mcg/act inhaler   No No   Sig: Inhale 2 puffs every 4 (four) hours as needed for wheezing   baclofen 10 mg tablet   No No   Sig: Take 1 tablet (10 mg total) by mouth 3 (three) times a day   Patient not taking: Reported on 6/1/2019   ibuprofen (MOTRIN) 400 mg tablet   No No   Sig: Take 2 tablets (800 mg total) by mouth every 6 (six) hours as needed for mild pain   naproxen (NAPROSYN) 500 mg tablet   No No   Sig: Take 1 tablet (500 mg total) by mouth 2 (two) times a day with meals   Patient not taking: Reported on 10/31/2019   ondansetron (ZOFRAN) 4 mg tablet   No No   Sig: Take 1 tablet (4 mg total) by mouth every 8 (eight) hours as needed for nausea   Patient not taking: Reported on 10/31/2019      Facility-Administered Medications: None       Past Medical History:   Diagnosis Date    Asthma        Past Surgical History:   Procedure Laterality Date     SECTION      five     SECTION      CHOLECYSTECTOMY      TONSILLECTOMY         History reviewed  No pertinent family history  I have reviewed and agree with the history as documented  Social History     Tobacco Use    Smoking status: Current Every Day Smoker     Packs/day: 0 25     Types: Cigarettes    Smokeless tobacco: Never Used   Substance Use Topics    Alcohol use: Yes     Comment: occassionally    Drug use: No       Review of Systems   Constitutional: Negative for chills, fatigue and fever  HENT: Positive for congestion, postnasal drip, rhinorrhea, sinus pressure and sneezing  Negative for ear pain and sore throat  Eyes: Negative for redness  Respiratory: Negative for chest tightness and shortness of breath  Cardiovascular: Negative for chest pain and palpitations  Gastrointestinal: Negative for abdominal pain, nausea and vomiting  Genitourinary: Negative for dysuria and hematuria  Musculoskeletal: Negative  Skin: Negative for rash  Neurological: Negative for dizziness, syncope, light-headedness and numbness  Physical Exam  Physical Exam   Constitutional: She is oriented to person, place, and time  She appears well-developed and well-nourished  Morbidly obese female in no acute distress   HENT:   Head: Normocephalic  Eyes: No scleral icterus  Cardiovascular: Normal rate and regular rhythm  Pulmonary/Chest: Effort normal and breath sounds normal  No stridor     Patient in no respiratory distress, speaking in full sentences, managing oral secretions without difficulty, no accessory muscle use, retractions, or belly breathing noted, no adventitious lung sounds auscultated bilaterally  Abdominal: Soft  She exhibits no distension  There is no tenderness  Musculoskeletal: Normal range of motion  Neurological: She is alert and oriented to person, place, and time  Skin: Skin is warm and dry  Capillary refill takes less than 2 seconds  Psychiatric: She has a normal mood and affect  Nursing note and vitals reviewed  Vital Signs  ED Triage Vitals [02/18/20 1229]   Temperature Pulse Respirations Blood Pressure SpO2   98 2 °F (36 8 °C) 77 12 99/53 94 %      Temp Source Heart Rate Source Patient Position - Orthostatic VS BP Location FiO2 (%)   Tympanic Monitor Sitting Right arm --      Pain Score       --           Vitals:    02/18/20 1229   BP: 99/53   Pulse: 77   Patient Position - Orthostatic VS: Sitting         Visual Acuity      ED Medications  Medications - No data to display    Diagnostic Studies  Results Reviewed     None                 No orders to display              Procedures  Procedures         ED Course                               MDM      Disposition  Final diagnoses:   Viral URI with cough     Time reflects when diagnosis was documented in both MDM as applicable and the Disposition within this note     Time User Action Codes Description Comment    2/18/2020  1:11 PM Juan Catalan Add [J06 9,  B97 89] Viral URI with cough       ED Disposition     ED Disposition Condition Date/Time Comment    Discharge Good Tumayra Feb 18, 2020  1:11 PM Sharon Hwang discharge to home/self care              Follow-up Information     Follow up With Specialties Details Why Contact Info    Ana Taveras MD Family Medicine Schedule an appointment as soon as possible for a visit in 1 day  [address]            Patient's Medications   Discharge Prescriptions DEXTROMETHORPHAN-GUAIFENESIN (ROBITUSSIN DM)  MG/5 ML SYRUP    Take 5 mL by mouth 3 (three) times a day as needed (cough) for up to 10 days       Start Date: 2/18/2020 End Date: 2/28/2020       Order Dose: 5 mL       Quantity: 118 mL    Refills: 0    LORATADINE (CLARITIN) 10 MG TABLET    Take 1 tablet (10 mg total) by mouth daily       Start Date: 2/18/2020 End Date: --       Order Dose: 10 mg       Quantity: 30 tablet    Refills: 0     No discharge procedures on file      PDMP Review     None          ED Provider  Electronically Signed by           Katrin Viveros PA-C  02/18/20 1466

## 2020-03-01 ENCOUNTER — HOSPITAL ENCOUNTER (EMERGENCY)
Facility: HOSPITAL | Age: 35
Discharge: HOME | End: 2020-03-01
Attending: EMERGENCY MEDICINE
Payer: COMMERCIAL

## 2020-03-01 ENCOUNTER — APPOINTMENT (EMERGENCY)
Dept: RADIOLOGY | Facility: HOSPITAL | Age: 35
End: 2020-03-01
Payer: COMMERCIAL

## 2020-03-01 VITALS
RESPIRATION RATE: 22 BRPM | OXYGEN SATURATION: 95 % | DIASTOLIC BLOOD PRESSURE: 72 MMHG | HEART RATE: 74 BPM | SYSTOLIC BLOOD PRESSURE: 120 MMHG

## 2020-03-01 DIAGNOSIS — J20.9 ACUTE BRONCHITIS: Primary | ICD-10-CM

## 2020-03-01 DIAGNOSIS — H66.91 RIGHT OTITIS MEDIA: ICD-10-CM

## 2020-03-01 LAB
ANION GAP SERPL CALCULATED.3IONS-SCNC: 8 MMOL/L (ref 4–13)
ATRIAL RATE: 74 BPM
BASOPHILS # BLD AUTO: 0.03 THOUSANDS/ΜL (ref 0–0.1)
BASOPHILS NFR BLD AUTO: 0 % (ref 0–1)
BUN SERPL-MCNC: 16 MG/DL (ref 5–25)
CALCIUM SERPL-MCNC: 8.7 MG/DL (ref 8.3–10.1)
CHLORIDE SERPL-SCNC: 105 MMOL/L (ref 100–108)
CO2 SERPL-SCNC: 26 MMOL/L (ref 21–32)
CREAT SERPL-MCNC: 0.52 MG/DL (ref 0.6–1.3)
EOSINOPHIL # BLD AUTO: 0.07 THOUSAND/ΜL (ref 0–0.61)
EOSINOPHIL NFR BLD AUTO: 1 % (ref 0–6)
ERYTHROCYTE [DISTWIDTH] IN BLOOD BY AUTOMATED COUNT: 14.6 % (ref 11.6–15.1)
FLUAV RNA NPH QL NAA+PROBE: NORMAL
FLUBV RNA NPH QL NAA+PROBE: NORMAL
GFR SERPL CREATININE-BSD FRML MDRD: 123 ML/MIN/1.73SQ M
GLUCOSE SERPL-MCNC: 101 MG/DL (ref 65–140)
HCT VFR BLD AUTO: 42.7 % (ref 34.8–46.1)
HGB BLD-MCNC: 13.6 G/DL (ref 11.5–15.4)
IMM GRANULOCYTES # BLD AUTO: 0.02 THOUSAND/UL (ref 0–0.2)
IMM GRANULOCYTES NFR BLD AUTO: 0 % (ref 0–2)
LYMPHOCYTES # BLD AUTO: 3.45 THOUSANDS/ΜL (ref 0.6–4.47)
LYMPHOCYTES NFR BLD AUTO: 41 % (ref 14–44)
MAGNESIUM SERPL-MCNC: 1.7 MG/DL (ref 1.6–2.6)
MCH RBC QN AUTO: 29.1 PG (ref 26.8–34.3)
MCHC RBC AUTO-ENTMCNC: 31.9 G/DL (ref 31.4–37.4)
MCV RBC AUTO: 91 FL (ref 82–98)
MONOCYTES # BLD AUTO: 0.54 THOUSAND/ΜL (ref 0.17–1.22)
MONOCYTES NFR BLD AUTO: 6 % (ref 4–12)
NEUTROPHILS # BLD AUTO: 4.39 THOUSANDS/ΜL (ref 1.85–7.62)
NEUTS SEG NFR BLD AUTO: 52 % (ref 43–75)
NRBC BLD AUTO-RTO: 0 /100 WBCS
NT-PROBNP SERPL-MCNC: 38 PG/ML
P AXIS: 50 DEGREES
PLATELET # BLD AUTO: 254 THOUSANDS/UL (ref 149–390)
PMV BLD AUTO: 12 FL (ref 8.9–12.7)
POTASSIUM SERPL-SCNC: 4.4 MMOL/L (ref 3.5–5.3)
PR INTERVAL: 120 MS
QRS AXIS: 89 DEGREES
QRSD INTERVAL: 88 MS
QT INTERVAL: 384 MS
QTC INTERVAL: 426 MS
RBC # BLD AUTO: 4.67 MILLION/UL (ref 3.81–5.12)
RSV RNA NPH QL NAA+PROBE: NORMAL
SODIUM SERPL-SCNC: 139 MMOL/L (ref 136–145)
T WAVE AXIS: 23 DEGREES
TROPONIN I SERPL-MCNC: <0.02 NG/ML
VENTRICULAR RATE: 74 BPM
WBC # BLD AUTO: 8.5 THOUSAND/UL (ref 4.31–10.16)

## 2020-03-01 PROCEDURE — 94640 AIRWAY INHALATION TREATMENT: CPT

## 2020-03-01 PROCEDURE — 83880 ASSAY OF NATRIURETIC PEPTIDE: CPT | Performed by: EMERGENCY MEDICINE

## 2020-03-01 PROCEDURE — 83735 ASSAY OF MAGNESIUM: CPT | Performed by: EMERGENCY MEDICINE

## 2020-03-01 PROCEDURE — 96365 THER/PROPH/DIAG IV INF INIT: CPT

## 2020-03-01 PROCEDURE — 99285 EMERGENCY DEPT VISIT HI MDM: CPT

## 2020-03-01 PROCEDURE — 94640 AIRWAY INHALATION TREATMENT: CPT | Performed by: EMERGENCY MEDICINE

## 2020-03-01 PROCEDURE — 93010 ELECTROCARDIOGRAM REPORT: CPT | Performed by: INTERNAL MEDICINE

## 2020-03-01 PROCEDURE — 85025 COMPLETE CBC W/AUTO DIFF WBC: CPT | Performed by: EMERGENCY MEDICINE

## 2020-03-01 PROCEDURE — 87631 RESP VIRUS 3-5 TARGETS: CPT | Performed by: EMERGENCY MEDICINE

## 2020-03-01 PROCEDURE — 36415 COLL VENOUS BLD VENIPUNCTURE: CPT | Performed by: EMERGENCY MEDICINE

## 2020-03-01 PROCEDURE — 80048 BASIC METABOLIC PNL TOTAL CA: CPT | Performed by: EMERGENCY MEDICINE

## 2020-03-01 PROCEDURE — 71046 X-RAY EXAM CHEST 2 VIEWS: CPT

## 2020-03-01 PROCEDURE — 99406 BEHAV CHNG SMOKING 3-10 MIN: CPT | Performed by: EMERGENCY MEDICINE

## 2020-03-01 PROCEDURE — 93005 ELECTROCARDIOGRAM TRACING: CPT

## 2020-03-01 PROCEDURE — 99285 EMERGENCY DEPT VISIT HI MDM: CPT | Performed by: EMERGENCY MEDICINE

## 2020-03-01 PROCEDURE — 96375 TX/PRO/DX INJ NEW DRUG ADDON: CPT

## 2020-03-01 PROCEDURE — 84484 ASSAY OF TROPONIN QUANT: CPT | Performed by: EMERGENCY MEDICINE

## 2020-03-01 RX ORDER — ALBUTEROL SULFATE 2.5 MG/3ML
5 SOLUTION RESPIRATORY (INHALATION) ONCE
Status: COMPLETED | OUTPATIENT
Start: 2020-03-01 | End: 2020-03-01

## 2020-03-01 RX ORDER — PREDNISONE 20 MG/1
40 TABLET ORAL DAILY
Qty: 10 TABLET | Refills: 0 | Status: SHIPPED | OUTPATIENT
Start: 2020-03-01 | End: 2020-03-06

## 2020-03-01 RX ORDER — AZITHROMYCIN 250 MG/1
250 TABLET, FILM COATED ORAL DAILY
Qty: 6 TABLET | Refills: 0 | Status: SHIPPED | OUTPATIENT
Start: 2020-03-01 | End: 2020-03-06

## 2020-03-01 RX ORDER — ALBUTEROL SULFATE 90 UG/1
2 AEROSOL, METERED RESPIRATORY (INHALATION) EVERY 4 HOURS PRN
Qty: 1 INHALER | Refills: 0 | Status: SHIPPED | OUTPATIENT
Start: 2020-03-01

## 2020-03-01 RX ORDER — METHYLPREDNISOLONE SODIUM SUCCINATE 125 MG/2ML
80 INJECTION, POWDER, LYOPHILIZED, FOR SOLUTION INTRAMUSCULAR; INTRAVENOUS ONCE
Status: COMPLETED | OUTPATIENT
Start: 2020-03-01 | End: 2020-03-01

## 2020-03-01 RX ORDER — MAGNESIUM SULFATE HEPTAHYDRATE 40 MG/ML
2 INJECTION, SOLUTION INTRAVENOUS ONCE
Status: COMPLETED | OUTPATIENT
Start: 2020-03-01 | End: 2020-03-01

## 2020-03-01 RX ADMIN — MAGNESIUM SULFATE HEPTAHYDRATE 2 G: 40 INJECTION, SOLUTION INTRAVENOUS at 10:16

## 2020-03-01 RX ADMIN — METHYLPREDNISOLONE SODIUM SUCCINATE 80 MG: 125 INJECTION, POWDER, FOR SOLUTION INTRAMUSCULAR; INTRAVENOUS at 10:15

## 2020-03-01 RX ADMIN — ALBUTEROL SULFATE 5 MG: 2.5 SOLUTION RESPIRATORY (INHALATION) at 09:17

## 2020-03-01 NOTE — ED NOTES
Attempted to obtain iv access unsuccessful  Second RN will attempt will follow up          Jens Marques, BONITA  03/01/20 6973

## 2020-03-01 NOTE — ED PROVIDER NOTES
History  Chief Complaint   Patient presents with    Shortness of Breath     pt reports SOB and CP that started a couple of days ago    Chest Pain       History provided by:  Patient   used: No    Medical Problem   Location:  Cough shortness of breath sharp occasional chest pain right ear pain for the last week got worse over the last several days  No fever  Smoker  Morbidly obese  History of asthma  No inhalers  Severity:  Moderate  Onset quality:  Gradual  Duration:  1 week  Timing:  Constant  Progression:  Worsening  Chronicity:  New  Relieved by:  Nothing  Worsened by:  Cough  Ineffective treatments:  None tried  Associated symptoms: chest pain, congestion, cough, ear pain, rhinorrhea and shortness of breath    Associated symptoms: no abdominal pain, no fever, no headaches, no nausea, no sore throat and no vomiting        Prior to Admission Medications   Prescriptions Last Dose Informant Patient Reported? Taking?    albuterol (2 5 mg/3 mL) 0 083 % nebulizer solution   Yes No   Sig: Inhale 2 5 mg every 6 (six) hours as needed   albuterol (PROVENTIL HFA,VENTOLIN HFA) 90 mcg/act inhaler   No No   Sig: Inhale 2 puffs every 4 (four) hours as needed for wheezing   albuterol (PROVENTIL HFA,VENTOLIN HFA) 90 mcg/act inhaler   No No   Sig: Inhale 2 puffs every 4 (four) hours as needed for wheezing   baclofen 10 mg tablet   No No   Sig: Take 1 tablet (10 mg total) by mouth 3 (three) times a day   Patient not taking: Reported on 6/1/2019   ibuprofen (MOTRIN) 400 mg tablet   No No   Sig: Take 2 tablets (800 mg total) by mouth every 6 (six) hours as needed for mild pain   loratadine (CLARITIN) 10 mg tablet   No No   Sig: Take 1 tablet (10 mg total) by mouth daily   naproxen (NAPROSYN) 500 mg tablet   No No   Sig: Take 1 tablet (500 mg total) by mouth 2 (two) times a day with meals   Patient not taking: Reported on 10/31/2019   ondansetron (ZOFRAN) 4 mg tablet   No No   Sig: Take 1 tablet (4 mg total) by mouth every 8 (eight) hours as needed for nausea   Patient not taking: Reported on 10/31/2019      Facility-Administered Medications: None       Past Medical History:   Diagnosis Date    Asthma        Past Surgical History:   Procedure Laterality Date     SECTION      five     SECTION      CHOLECYSTECTOMY      TONSILLECTOMY         History reviewed  No pertinent family history  I have reviewed and agree with the history as documented  E-Cigarette/Vaping    E-Cigarette Use Never User      E-Cigarette/Vaping Substances     Social History     Tobacco Use    Smoking status: Current Every Day Smoker     Packs/day: 0 25     Types: Cigarettes    Smokeless tobacco: Never Used   Substance Use Topics    Alcohol use: Yes     Comment: occassionally    Drug use: No       Review of Systems   Constitutional: Negative for fever  HENT: Positive for congestion, ear pain and rhinorrhea  Negative for sore throat  Respiratory: Positive for cough, chest tightness and shortness of breath  Cardiovascular: Positive for chest pain  Negative for leg swelling  Gastrointestinal: Negative for abdominal pain, nausea and vomiting  Neurological: Negative for headaches  All other systems reviewed and are negative  Physical Exam  Physical Exam   Constitutional: She appears well-developed and well-nourished  She is cooperative  Non-toxic appearance  She does not have a sickly appearance  She does not appear ill  No distress  HENT:   Head: Normocephalic and atraumatic  Right Ear: Hearing normal  No drainage or swelling  Tympanic membrane is erythematous and bulging  Left Ear: Hearing and tympanic membrane normal  No drainage or swelling  Nose: Mucosal edema present  Mouth/Throat: Uvula is midline, oropharynx is clear and moist and mucous membranes are normal  No oropharyngeal exudate  Eyes: Conjunctivae and lids are normal  Right eye exhibits no discharge  Left eye exhibits no discharge  Neck: Trachea normal and normal range of motion  No JVD present  Cardiovascular: Normal rate, regular rhythm, normal heart sounds, intact distal pulses and normal pulses  Exam reveals no gallop and no friction rub  No murmur heard  Pulmonary/Chest: Effort normal  No stridor  No respiratory distress  She has wheezes  She has no rales  Abdominal: Soft  Normal appearance  There is no tenderness  There is no rebound, no guarding and no CVA tenderness  Musculoskeletal: Normal range of motion  She exhibits no edema, tenderness or deformity  Lymphadenopathy:     She has no cervical adenopathy  Neurological: She is alert  She has normal strength  She exhibits normal muscle tone  GCS eye subscore is 4  GCS verbal subscore is 5  GCS motor subscore is 6  Skin: Skin is warm, dry and intact  No rash noted  She is not diaphoretic  No pallor  Psychiatric: She has a normal mood and affect  Her speech is normal  Cognition and memory are normal    Nursing note and vitals reviewed        Vital Signs  ED Triage Vitals [03/01/20 0856]   Temp Pulse Respirations Blood Pressure SpO2   -- 74 22 120/72 95 %      Temp src Heart Rate Source Patient Position - Orthostatic VS BP Location FiO2 (%)   -- Monitor -- -- --      Pain Score       --           Vitals:    03/01/20 0856   BP: 120/72   Pulse: 74         Visual Acuity      ED Medications  Medications   methylPREDNISolone sodium succinate (Solu-MEDROL) injection 80 mg (80 mg Intravenous Given 3/1/20 1015)   albuterol inhalation solution 5 mg (5 mg Nebulization Given 3/1/20 0917)   magnesium sulfate 2 g/50 mL IVPB (premix) 2 g (2 g Intravenous New Bag 3/1/20 1016)       Diagnostic Studies  Results Reviewed     Procedure Component Value Units Date/Time    Basic metabolic panel [[id number]]  (Abnormal) Collected:  03/01/20 1012    Lab Status:  Final result Specimen:  Blood from Arm, Right Updated:  03/01/20 1047     Sodium 139 mmol/L      Potassium 4 4 mmol/L      Chloride 105 mmol/L      CO2 26 mmol/L      ANION GAP 8 mmol/L      BUN 16 mg/dL      Creatinine 0 52 mg/dL      Glucose 101 mg/dL      Calcium 8 7 mg/dL      eGFR 123 ml/min/1 73sq m     Narrative:       National Kid[employer] guidelines for Chronic Kidney Disease (CKD):     Stage 1 with normal or high GFR (GFR > 90 mL/min/1 73 square meters)    Stage 2 Mild CKD (GFR = 60-89 mL/min/1 73 square meters)    Stage 3A Moderate CKD (GFR = 45-59 mL/min/1 73 square meters)    Stage 3B Moderate CKD (GFR = 30-44 mL/min/1 73 square meters)    Stage 4 Severe CKD (GFR = 15-29 mL/min/1 73 square meters)    Stage 5 End Stage CKD (GFR <15 mL/min/1 73 square meters)  Note: GFR calculation is accurate only with a steady state creatinine    NT-BNP PRO [[id number]]  (Normal) Collected:  03/01/20 1012    Lab Status:  Final result Specimen:  Blood from Arm, Right Updated:  03/01/20 1047     NT-proBNP 38 pg/mL     Magnesium [[id number]]  (Normal) Collected:  03/01/20 1012    Lab Status:  Final result Specimen:  Blood from Arm, Right Updated:  03/01/20 1047     Magnesium 1 7 mg/dL     Troponin I [[id number]]  (Normal) Collected:  03/01/20 1012    Lab Status:  Final result Specimen:  Blood from Arm, Right Updated:  03/01/20 1037     Troponin I <0 02 ng/mL     Influenza A/B and RSV PCR [[id number]]  (Normal) Collected:  03/01/20 0930    Lab Status:  Final result Specimen:  Nose Updated:  03/01/20 1019     INFLUENZA A PCR None Detected     INFLUENZA B PCR None Detected     RSV PCR None Detected    CBC and differential [[id number]] Collected:  03/01/20 1012    Lab Status:  Final result Specimen:  Blood from Arm, Right Updated:  03/01/20 1018     WBC 8 50 Thousand/uL      RBC 4 67 Million/uL      Hemoglobin 13 6 g/dL      Hematocrit 42 7 %      MCV 91 fL      MCH 29 1 pg      MCHC 31 9 g/dL      RDW 14 6 %      MPV 12 0 fL      Platelets 670 Thousands/uL      nRBC 0 /100 WBCs      Neutrophils Relative 52 %      Immat GRANS % 0 % Lymphocytes Relative 41 %      Monocytes Relative 6 %      Eosinophils Relative 1 %      Basophils Relative 0 %      Neutrophils Absolute 4 39 Thousands/µL      Immature Grans Absolute 0 02 Thousand/uL      Lymphocytes Absolute 3 45 Thousands/µL      Monocytes Absolute 0 54 Thousand/µL      Eosinophils Absolute 0 07 Thousand/µL      Basophils Absolute 0 03 Thousands/µL                  XR chest 2 views   ED Interpretation by Jonas Sheth MD (03/01 5284)   I have personally reviewed the x-ray and my findings are: no acute disease  Procedures  ECG 12 Lead Documentation Only  Date/Time: 3/1/2020 8:57 AM  Performed by: Jonas Sheth MD  Authorized by: Jonas Sheth MD     Indications / Diagnosis:  Cp  ECG reviewed by me, the ED Provider: yes    Patient location:  ED  Interpretation:     Interpretation: normal    Rate:     ECG rate assessment: normal    Rhythm:     Rhythm: sinus rhythm    Ectopy:     Ectopy: none    QRS:     QRS axis:  Normal    QRS intervals:  Normal  Conduction:     Conduction: normal    ST segments:     ST segments:  Normal  T waves:     T waves: normal               ED Course                               MDM  Number of Diagnoses or Management Options  Acute bronchitis:   Right otitis media:   Diagnosis management comments: X-ray negative  Flu test negative  Feeling better after medications  I spent 5 minutes discussing quitting smoking with her and how bad it is for her asthma  She has some bronchitis which will need to treat with some steroids as well as albuterol and will place her on that the mycin and she also had bulging right TM  Possibly viral verses atypical bacterial pathogen  She is afebrile here    Pulse ox normal   EKG unremarkable       Amount and/or Complexity of Data Reviewed  Clinical lab tests: ordered and reviewed  Tests in the radiology section of CPT®: ordered and reviewed  Tests in the medicine section of CPT®: ordered and reviewed  Independent visualization of images, tracings, or specimens: yes          Disposition  Final diagnoses:   Acute bronchitis   Right otitis media     Time reflects when diagnosis was documented in both MDM as applicable and the Disposition within this note     Time User Action Codes Description Comment    3/1/2020 10:52 AM Breanna CARLIN Add [J20 9] Acute bronchitis     3/1/2020 10:52 AM Breanna Muniz Add [H66 91] Right otitis media       ED Disposition     ED Disposition Condition Date/Time Comment    Discharge Stable Sun Mar 1, 2020 10:52 AM Tyron Smith discharge to home/self care  Follow-up Information     Follow up With Specialties Details Why Contact Info    Morenita Gaytan MD Family Medicine Schedule an appointment as soon as possible for a visit in 2 days If symptoms worsen [address]  Call  for a primary care doctor [phone]            Patient's Medications   Discharge Prescriptions    ALBUTEROL (PROVENTIL HFA,VENTOLIN HFA) 90 MCG/ACT INHALER    Inhale 2 puffs every 4 (four) hours as needed for wheezing       Start Date: 3/1/2020  End Date: --       Order Dose: 2 puffs       Quantity: 1 Inhaler    Refills: 0    AZITHROMYCIN (ZITHROMAX) 250 MG TABLET    Take 1 tablet (250 mg total) by mouth daily for 5 days Take first 2 tablets together, then 1 every day until finished  Start Date: 3/1/2020  End Date: 3/6/2020       Order Dose: 250 mg       Quantity: 6 tablet    Refills: 0    PREDNISONE 20 MG TABLET    Take 2 tablets (40 mg total) by mouth daily for 5 days       Start Date: 3/1/2020  End Date: 3/6/2020       Order Dose: 40 mg       Quantity: 10 tablet    Refills: 0     No discharge procedures on file      PDMP Review     None          ED Provider  Electronically Signed by           Itz Gant MD  03/01/20 9057

## 2020-08-25 ENCOUNTER — HOSPITAL ENCOUNTER (EMERGENCY)
Facility: HOSPITAL | Age: 35
Discharge: HOME | End: 2020-08-25
Attending: EMERGENCY MEDICINE | Admitting: EMERGENCY MEDICINE
Payer: COMMERCIAL

## 2020-08-25 VITALS
DIASTOLIC BLOOD PRESSURE: 55 MMHG | HEART RATE: 82 BPM | TEMPERATURE: 98 F | SYSTOLIC BLOOD PRESSURE: 123 MMHG | OXYGEN SATURATION: 99 % | HEIGHT: 60 IN | BODY MASS INDEX: 57.52 KG/M2 | RESPIRATION RATE: 18 BRPM | WEIGHT: 293 LBS

## 2020-08-25 DIAGNOSIS — R60.9 PERIPHERAL EDEMA: Primary | ICD-10-CM

## 2020-08-25 LAB
ALBUMIN SERPL BCP-MCNC: 3.1 G/DL (ref 3.5–5)
ALP SERPL-CCNC: 88 U/L (ref 46–116)
ALT SERPL W P-5'-P-CCNC: 27 U/L (ref 12–78)
ANION GAP SERPL CALCULATED.3IONS-SCNC: 9 MMOL/L (ref 4–13)
AST SERPL W P-5'-P-CCNC: 17 U/L (ref 5–45)
BASOPHILS # BLD AUTO: 0.04 THOUSANDS/ΜL (ref 0–0.1)
BASOPHILS NFR BLD AUTO: 0 % (ref 0–1)
BILIRUB SERPL-MCNC: 0.16 MG/DL (ref 0.2–1)
BILIRUB UR QL STRIP: NEGATIVE
BUN SERPL-MCNC: 11 MG/DL (ref 5–25)
CALCIUM SERPL-MCNC: 8.9 MG/DL (ref 8.3–10.1)
CHLORIDE SERPL-SCNC: 107 MMOL/L (ref 100–108)
CLARITY UR: CLEAR
CO2 SERPL-SCNC: 25 MMOL/L (ref 21–32)
COLOR UR: YELLOW
COLOR, POC: YELLOW
CREAT SERPL-MCNC: 0.59 MG/DL (ref 0.6–1.3)
EOSINOPHIL # BLD AUTO: 0.09 THOUSAND/ΜL (ref 0–0.61)
EOSINOPHIL NFR BLD AUTO: 1 % (ref 0–6)
ERYTHROCYTE [DISTWIDTH] IN BLOOD BY AUTOMATED COUNT: 15.5 % (ref 11.6–15.1)
EXT PREG TEST URINE: NEGATIVE
EXT. CONTROL ED NAV: NORMAL
GFR SERPL CREATININE-BSD FRML MDRD: 117 ML/MIN/1.73SQ M
GLUCOSE SERPL-MCNC: 102 MG/DL (ref 65–140)
GLUCOSE UR STRIP-MCNC: NEGATIVE MG/DL
HCT VFR BLD AUTO: 41.1 % (ref 34.8–46.1)
HGB BLD-MCNC: 13 G/DL (ref 11.5–15.4)
HGB UR QL STRIP.AUTO: ABNORMAL
IMM GRANULOCYTES # BLD AUTO: 0.05 THOUSAND/UL (ref 0–0.2)
IMM GRANULOCYTES NFR BLD AUTO: 0 % (ref 0–2)
KETONES UR STRIP-MCNC: NEGATIVE MG/DL
LEUKOCYTE ESTERASE UR QL STRIP: NEGATIVE
LYMPHOCYTES # BLD AUTO: 2.8 THOUSANDS/ΜL (ref 0.6–4.47)
LYMPHOCYTES NFR BLD AUTO: 24 % (ref 14–44)
MCH RBC QN AUTO: 29.4 PG (ref 26.8–34.3)
MCHC RBC AUTO-ENTMCNC: 31.6 G/DL (ref 31.4–37.4)
MCV RBC AUTO: 93 FL (ref 82–98)
MONOCYTES # BLD AUTO: 0.74 THOUSAND/ΜL (ref 0.17–1.22)
MONOCYTES NFR BLD AUTO: 6 % (ref 4–12)
NEUTROPHILS # BLD AUTO: 7.78 THOUSANDS/ΜL (ref 1.85–7.62)
NEUTS SEG NFR BLD AUTO: 69 % (ref 43–75)
NITRITE UR QL STRIP: NEGATIVE
NRBC BLD AUTO-RTO: 0 /100 WBCS
PH UR STRIP.AUTO: 6 [PH] (ref 4.5–8)
PLATELET # BLD AUTO: 257 THOUSANDS/UL (ref 149–390)
PMV BLD AUTO: 11.6 FL (ref 8.9–12.7)
POTASSIUM SERPL-SCNC: 3.9 MMOL/L (ref 3.5–5.3)
PROT SERPL-MCNC: 6.9 G/DL (ref 6.4–8.2)
PROT UR STRIP-MCNC: NEGATIVE MG/DL
RBC # BLD AUTO: 4.42 MILLION/UL (ref 3.81–5.12)
SODIUM SERPL-SCNC: 141 MMOL/L (ref 136–145)
SP GR UR STRIP.AUTO: 1.02 (ref 1–1.03)
UROBILINOGEN UR QL STRIP.AUTO: 0.2 E.U./DL
WBC # BLD AUTO: 11.5 THOUSAND/UL (ref 4.31–10.16)

## 2020-08-25 PROCEDURE — 81025 URINE PREGNANCY TEST: CPT | Performed by: EMERGENCY MEDICINE

## 2020-08-25 PROCEDURE — 85025 COMPLETE CBC W/AUTO DIFF WBC: CPT | Performed by: FAMILY MEDICINE

## 2020-08-25 PROCEDURE — 81002 URINALYSIS NONAUTO W/O SCOPE: CPT | Performed by: EMERGENCY MEDICINE

## 2020-08-25 PROCEDURE — 80053 COMPREHEN METABOLIC PANEL: CPT | Performed by: FAMILY MEDICINE

## 2020-08-25 PROCEDURE — 36415 COLL VENOUS BLD VENIPUNCTURE: CPT | Performed by: FAMILY MEDICINE

## 2020-08-25 PROCEDURE — 99282 EMERGENCY DEPT VISIT SF MDM: CPT | Performed by: EMERGENCY MEDICINE

## 2020-08-25 PROCEDURE — 99284 EMERGENCY DEPT VISIT MOD MDM: CPT

## 2020-08-25 NOTE — ED ATTENDING ATTESTATION
8/25/2020  I, Soyla Opitz, MD, saw and evaluated the patient  I have discussed the patient with the resident/non-physician practitioner and agree with the resident's/non-physician practitioner's findings, Plan of Care, and MDM as documented in the resident's/non-physician practitioner's note, except where noted  All available labs and Radiology studies were reviewed  I was present for key portions of any procedure(s) performed by the resident/non-physician practitioner and I was immediately available to provide assistance  At this point I agree with the current assessment done in the Emergency Department  I have conducted an independent evaluation of this patient a history and physical is as follows:    78-year-old female presents for evaluation of multiple complaints  Patient states that over the past 3 weeks her bilateral lower extremities appear to be swollen  She states they feel heavy as well  There constant and unchanged  She states it started after she suffered a mechanical fall  She denies back pain  She does report UTI symptoms  She reports cloudy foul-smelling urine without abdominal pain, fevers, chills, dysuria  No vaginal bleeding or discharge  No shortness of breath, orthopnea, paroxysmal nocturnal dyspnea  Ten systems reviewed otherwise negative  Exam no distress, lungs normal cardiac normal abdomen normal, bilateral lower extremity exams are within normal limits  Medical decision making;-dysuria-will do urine dip, urine pregnancy      Subjective bilateral lower extremity swelling with benign exam with absence of risk factors for DVT or PE-will check CMP, urine, conservative treatment, PCP follow-up    ED Course         Critical Care Time  Procedures

## 2020-08-25 NOTE — ED PROVIDER NOTES
History  Chief Complaint   Patient presents with    Edema     bilateral lower extremity edema and pain x 3 weeks after dalling    Possible UTI     pain and burning, urgency adn frequency x 1 month    Medication Refill     albuterol MDI     Patient has 3 week history of lower extremity edema localized to lower leg and foot  Patient states the swelling has gotten worse gradually within that time  It is especially bad in bilateral feet  She has not tried compression socks or elevation to alleviate edema  She states she eats a lot of fast food, fried foods and high intake of salt daily  She also states lack of hydration with water and more so with soda  She is also complaining 1 month history of urinary symptoms, see below  Urinary Frequency   Location:  Suprapubic pressure  Quality:  Intermittent especially during urination  Severity:  Mild  Onset quality:  Gradual  Duration:  1 month  Timing:  Intermittent  Progression:  Unchanged  Chronicity:  Chronic  Context:  Patient mentions dribbling during urination, increased frequency, burning, malodorous urine, cloudy urine extending 1 month duration  Relieved by:  Nothing  Worsened by:  Nothing  Ineffective treatments:  Cranberry juice  Associated symptoms: no abdominal pain, no chest pain, no congestion, no cough, no diarrhea, no fatigue, no fever, no headaches, no loss of consciousness, no myalgias, no nausea, no rash, no rhinorrhea, no shortness of breath, no sore throat, no vomiting and no wheezing        Prior to Admission Medications   Prescriptions Last Dose Informant Patient Reported? Taking?    albuterol (2 5 mg/3 mL) 0 083 % nebulizer solution   Yes Yes   Sig: Inhale 2 5 mg every 6 (six) hours as needed   albuterol (PROVENTIL HFA,VENTOLIN HFA) 90 mcg/act inhaler   No Yes   Sig: Inhale 2 puffs every 4 (four) hours as needed for wheezing   loratadine (CLARITIN) 10 mg tablet Unknown at Unknown time  No No   Sig: Take 1 tablet (10 mg total) by mouth daily Facility-Administered Medications: None       Past Medical History:   Diagnosis Date    Asthma        Past Surgical History:   Procedure Laterality Date     SECTION      five     SECTION      CHOLECYSTECTOMY      TONSILLECTOMY         History reviewed  No pertinent family history  I have reviewed and agree with the history as documented  E-Cigarette/Vaping    E-Cigarette Use Never User      E-Cigarette/Vaping Substances     Social History     Tobacco Use    Smoking status: Current Every Day Smoker     Packs/day: 0 50     Types: Cigarettes    Smokeless tobacco: Never Used   Substance Use Topics    Alcohol use: Yes     Comment: occassionally    Drug use: No        Review of Systems   Constitutional: Negative for fatigue and fever  HENT: Negative for congestion, rhinorrhea and sore throat  Respiratory: Negative for cough, shortness of breath and wheezing  Cardiovascular: Negative for chest pain  Gastrointestinal: Negative for abdominal pain, diarrhea, nausea and vomiting  Genitourinary: Positive for decreased urine volume, difficulty urinating, frequency and urgency  Negative for flank pain and hematuria  Musculoskeletal: Negative for myalgias  Skin: Negative for rash  Neurological: Negative for loss of consciousness and headaches  Psychiatric/Behavioral: Negative for sleep disturbance  All other systems reviewed and are negative  Physical Exam  ED Triage Vitals [20 1012]   Temperature Pulse Respirations Blood Pressure SpO2   98 °F (36 7 °C) 82 18 123/55 99 %      Temp src Heart Rate Source Patient Position - Orthostatic VS BP Location FiO2 (%)   -- Monitor -- Right arm --      Pain Score       Worst Possible Pain             Orthostatic Vital Signs  Vitals:    20 1012   BP: 123/55   Pulse: 82       Physical Exam  Vitals signs reviewed  Constitutional:       General: She is not in acute distress  Appearance: Normal appearance  She is obese  She is not ill-appearing or diaphoretic  HENT:      Head: Normocephalic and atraumatic  Nose: Nose normal    Eyes:      General: No scleral icterus  Conjunctiva/sclera: Conjunctivae normal       Pupils: Pupils are equal, round, and reactive to light  Neck:      Vascular: No JVD  Cardiovascular:      Rate and Rhythm: Normal rate and regular rhythm  Pulses: Normal pulses  Heart sounds: Normal heart sounds  No murmur  No friction rub  No gallop  Pulmonary:      Effort: Pulmonary effort is normal  No respiratory distress  Breath sounds: Normal breath sounds  No wheezing  Abdominal:      Palpations: Abdomen is soft  There is no mass  Tenderness: There is no right CVA tenderness or left CVA tenderness  Hernia: No hernia is present  Musculoskeletal:      Right shoulder: She exhibits swelling  She exhibits normal range of motion, no deformity, no pain and normal strength  Right lower leg: Edema present  Left lower leg: Edema present  Skin:     General: Skin is warm  Capillary Refill: Capillary refill takes less than 2 seconds  Neurological:      Mental Status: She is alert and oriented to person, place, and time     Psychiatric:         Mood and Affect: Mood normal          Behavior: Behavior normal          ED Medications  Medications - No data to display    Diagnostic Studies  Results Reviewed     Procedure Component Value Units Date/Time    Comprehensive metabolic panel [[id number]]  (Abnormal) Collected:  08/25/20 1121    Lab Status:  Final result Specimen:  Blood from Hand, Left Updated:  08/25/20 1142     Sodium 141 mmol/L      Potassium 3 9 mmol/L      Chloride 107 mmol/L      CO2 25 mmol/L      ANION GAP 9 mmol/L      BUN 11 mg/dL      Creatinine 0 59 mg/dL      Glucose 102 mg/dL      Calcium 8 9 mg/dL      AST 17 U/L      ALT 27 U/L      Alkaline Phosphatase 88 U/L      Total Protein 6 9 g/dL      Albumin 3 1 g/dL      Total Bilirubin 0 16 mg/dL eGFR 117 ml/min/1 73sq m     Narrative:       [place] guidelines for Chronic Kidney Disease (CKD):     Stage 1 with normal or high GFR (GFR > 90 mL/min/1 73 square meters)    Stage 2 Mild CKD (GFR = 60-89 mL/min/1 73 square meters)    Stage 3A Moderate CKD (GFR = 45-59 mL/min/1 73 square meters)    Stage 3B Moderate CKD (GFR = 30-44 mL/min/1 73 square meters)    Stage 4 Severe CKD (GFR = 15-29 mL/min/1 73 square meters)    Stage 5 End Stage CKD (GFR <15 mL/min/1 73 square meters)  Note: GFR calculation is accurate only with a steady state creatinine    POCT urinalysis dipstick [[id number]]  (Normal) Resulted:  08/25/20 1130    Lab Status:  Final result Specimen:  Urine, Other Updated:  08/25/20 1130     Color, UA yellow    POCT pregnancy, urine [[id number]]  (Normal) Resulted:  08/25/20 1130    Lab Status:  Final result Updated:  08/25/20 1130     EXT PREG TEST UR (Ref: Negative) negative     Control valid    CBC and differential [[id number]]  (Abnormal) Collected:  08/25/20 1121    Lab Status:  Final result Specimen:  Blood from Hand, Left Updated:  08/25/20 1127     WBC 11 50 Thousand/uL      RBC 4 42 Million/uL      Hemoglobin 13 0 g/dL      Hematocrit 41 1 %      MCV 93 fL      MCH 29 4 pg      MCHC 31 6 g/dL      RDW 15 5 %      MPV 11 6 fL      Platelets 538 Thousands/uL      nRBC 0 /100 WBCs      Neutrophils Relative 69 %      Immat GRANS % 0 %      Lymphocytes Relative 24 %      Monocytes Relative 6 %      Eosinophils Relative 1 %      Basophils Relative 0 %      Neutrophils Absolute 7 78 Thousands/µL      Immature Grans Absolute 0 05 Thousand/uL      Lymphocytes Absolute 2 80 Thousands/µL      Monocytes Absolute 0 74 Thousand/µL      Eosinophils Absolute 0 09 Thousand/µL      Basophils Absolute 0 04 Thousands/µL     Urine Macroscopic, POC [[id number]]  (Abnormal) Collected:  08/25/20 1123    Lab Status:  Final result Specimen:  Urine Updated:  08/25/20 1125     Color, UA Yellow Clarity, UA Clear     pH, UA 6 0     Leukocytes, UA Negative     Nitrite, UA Negative     Protein, UA Negative mg/dl      Glucose, UA Negative mg/dl      Ketones, UA Negative mg/dl      Urobilinogen, UA 0 2 E U /dl      Bilirubin, UA Negative     Blood, UA Trace     Specific [place], UA 1 025    Narrative:       CLINITEK RESULT    Urine Microscopic [[id number]] Collected:  08/25/20 1123    Lab Status:  No result Specimen:  Urine                  No orders to display         Procedures  Procedures      ED Course       US AUDIT      Most Recent Value   Initial Alcohol Screen: US AUDIT-C    1  How often do you have a drink containing alcohol? 2 Filed at: 08/25/2020 1013   2  How many drinks containing alcohol do you have on a typical day you are drinking? 1 Filed at: 08/25/2020 1013   3a  Male UNDER 65: How often do you have five or more drinks on one occasion? 0 Filed at: 08/25/2020 1013   3b  FEMALE Any Age, or MALE 65+: How often do you have 4 or more drinks on one occassion? 0 Filed at: 08/25/2020 1013   Audit-C Score  3 Filed at: 08/25/2020 1013                  DENNY/DAST-10      Most Recent Value   How many times in the past year have you    Used an illegal drug or used a prescription medication for non-medical reasons?   Never Filed at: 08/25/2020 1013                              MDM  Number of Diagnoses or Management Options     Amount and/or Complexity of Data Reviewed  Clinical lab tests: reviewed  Tests in the radiology section of CPT®: reviewed  Tests in the medicine section of CPT®: reviewed    Patient Progress  Patient progress: stable        Disposition  Final diagnoses:   Peripheral edema     Time reflects when diagnosis was documented in both MDM as applicable and the Disposition within this note     Time User Action Codes Description Comment    8/25/2020 12:03 PM Harini Red Add [R60 9] Peripheral edema       ED Disposition     ED Disposition Condition Date/Time Comment    Discharge Stable Tue Aug 25, 2020 12:03 PM Yovany Najera discharge to home/self care  Follow-up Information    None         Patient's Medications   Discharge Prescriptions    No medications on file     No discharge procedures on file  PDMP Review     None           ED Provider  Attending physically available and evaluated Yovany Najera  I managed the patient along with the ED Attending      Electronically Signed by         Galdis Mora DO  08/25/20 7219

## 2020-08-25 NOTE — ED NOTES
Pt was discharged by Ed provider pt did not give urine sample to send to lab        Tri Salomon RN  08/25/20 5540

## 2021-12-07 ENCOUNTER — APPOINTMENT (EMERGENCY)
Dept: CT IMAGING | Facility: HOSPITAL | Age: 36
End: 2021-12-07
Payer: MEDICARE

## 2021-12-07 ENCOUNTER — HOSPITAL ENCOUNTER (EMERGENCY)
Facility: HOSPITAL | Age: 36
Discharge: HOME | End: 2021-12-07
Attending: EMERGENCY MEDICINE | Admitting: EMERGENCY MEDICINE
Payer: MEDICARE

## 2021-12-07 VITALS
RESPIRATION RATE: 16 BRPM | WEIGHT: 293 LBS | OXYGEN SATURATION: 100 % | BODY MASS INDEX: 61.11 KG/M2 | DIASTOLIC BLOOD PRESSURE: 58 MMHG | TEMPERATURE: 97.9 F | HEART RATE: 75 BPM | SYSTOLIC BLOOD PRESSURE: 112 MMHG

## 2021-12-07 DIAGNOSIS — N12 PYELONEPHRITIS: Primary | ICD-10-CM

## 2021-12-07 DIAGNOSIS — R10.9 RIGHT FLANK PAIN: ICD-10-CM

## 2021-12-07 DIAGNOSIS — N39.0 UTI (URINARY TRACT INFECTION): ICD-10-CM

## 2021-12-07 LAB
ALBUMIN SERPL BCP-MCNC: 3.8 G/DL (ref 3–5.2)
ALP SERPL-CCNC: 111 U/L (ref 43–122)
ALT SERPL W P-5'-P-CCNC: 31 U/L
ANION GAP SERPL CALCULATED.3IONS-SCNC: 3 MMOL/L (ref 5–14)
AST SERPL W P-5'-P-CCNC: 34 U/L (ref 14–36)
BACTERIA UR QL AUTO: ABNORMAL /HPF
BASOPHILS # BLD AUTO: 0 THOUSANDS/ΜL (ref 0–0.1)
BASOPHILS NFR BLD AUTO: 0 % (ref 0–1)
BILIRUB SERPL-MCNC: 0.55 MG/DL
BILIRUB UR QL STRIP: NEGATIVE
BUN SERPL-MCNC: 10 MG/DL (ref 5–25)
CALCIUM SERPL-MCNC: 8.7 MG/DL (ref 8.4–10.2)
CHLORIDE SERPL-SCNC: 107 MMOL/L (ref 97–108)
CLARITY UR: ABNORMAL
CO2 SERPL-SCNC: 28 MMOL/L (ref 22–30)
COLOR UR: ABNORMAL
CREAT SERPL-MCNC: 0.52 MG/DL (ref 0.6–1.2)
EOSINOPHIL # BLD AUTO: 0.1 THOUSAND/ΜL (ref 0–0.4)
EOSINOPHIL NFR BLD AUTO: 1 % (ref 0–6)
ERYTHROCYTE [DISTWIDTH] IN BLOOD BY AUTOMATED COUNT: 15.7 %
EXT PREG TEST URINE: NEGATIVE
EXT. CONTROL ED NAV: NORMAL
GFR SERPL CREATININE-BSD FRML MDRD: 122 ML/MIN/1.73SQ M
GLUCOSE SERPL-MCNC: 91 MG/DL (ref 70–99)
GLUCOSE UR STRIP-MCNC: NEGATIVE MG/DL
HCT VFR BLD AUTO: 43.8 % (ref 36–46)
HGB BLD-MCNC: 14.5 G/DL (ref 12–16)
HGB UR QL STRIP.AUTO: NEGATIVE
KETONES UR STRIP-MCNC: NEGATIVE MG/DL
LEUKOCYTE ESTERASE UR QL STRIP: 100
LIPASE SERPL-CCNC: 51 U/L (ref 23–300)
LYMPHOCYTES # BLD AUTO: 2 THOUSANDS/ΜL (ref 0.5–4)
LYMPHOCYTES NFR BLD AUTO: 26 % (ref 25–45)
MCH RBC QN AUTO: 29.8 PG (ref 26–34)
MCHC RBC AUTO-ENTMCNC: 33.1 G/DL (ref 31–36)
MCV RBC AUTO: 90 FL (ref 80–100)
MONOCYTES # BLD AUTO: 0.5 THOUSAND/ΜL (ref 0.2–0.9)
MONOCYTES NFR BLD AUTO: 7 % (ref 1–10)
MUCOUS THREADS UR QL AUTO: ABNORMAL
NEUTROPHILS # BLD AUTO: 5 THOUSANDS/ΜL (ref 1.8–7.8)
NEUTS SEG NFR BLD AUTO: 66 % (ref 45–65)
NITRITE UR QL STRIP: NEGATIVE
NON-SQ EPI CELLS URNS QL MICRO: ABNORMAL /HPF
PH UR STRIP.AUTO: 7 [PH]
PLATELET # BLD AUTO: 228 THOUSANDS/UL (ref 150–450)
PMV BLD AUTO: 11.1 FL (ref 8.9–12.7)
POTASSIUM SERPL-SCNC: 3.9 MMOL/L (ref 3.6–5)
PROT SERPL-MCNC: 6.9 G/DL (ref 5.9–8.4)
PROT UR STRIP-MCNC: NEGATIVE MG/DL
RBC # BLD AUTO: 4.87 MILLION/UL (ref 4–5.2)
RBC #/AREA URNS AUTO: ABNORMAL /HPF
SODIUM SERPL-SCNC: 138 MMOL/L (ref 137–147)
SP GR UR STRIP.AUTO: 1.01 (ref 1–1.04)
UROBILINOGEN UA: NEGATIVE MG/DL
WBC # BLD AUTO: 7.5 THOUSAND/UL (ref 4.5–11)
WBC #/AREA URNS AUTO: ABNORMAL /HPF

## 2021-12-07 PROCEDURE — 83690 ASSAY OF LIPASE: CPT | Performed by: PHYSICIAN ASSISTANT

## 2021-12-07 PROCEDURE — 85025 COMPLETE CBC W/AUTO DIFF WBC: CPT | Performed by: PHYSICIAN ASSISTANT

## 2021-12-07 PROCEDURE — 96375 TX/PRO/DX INJ NEW DRUG ADDON: CPT

## 2021-12-07 PROCEDURE — 99284 EMERGENCY DEPT VISIT MOD MDM: CPT

## 2021-12-07 PROCEDURE — 81025 URINE PREGNANCY TEST: CPT | Performed by: PHYSICIAN ASSISTANT

## 2021-12-07 PROCEDURE — 74177 CT ABD & PELVIS W/CONTRAST: CPT

## 2021-12-07 PROCEDURE — 36415 COLL VENOUS BLD VENIPUNCTURE: CPT | Performed by: PHYSICIAN ASSISTANT

## 2021-12-07 PROCEDURE — 80053 COMPREHEN METABOLIC PANEL: CPT | Performed by: PHYSICIAN ASSISTANT

## 2021-12-07 PROCEDURE — 81003 URINALYSIS AUTO W/O SCOPE: CPT | Performed by: PHYSICIAN ASSISTANT

## 2021-12-07 PROCEDURE — 96365 THER/PROPH/DIAG IV INF INIT: CPT

## 2021-12-07 PROCEDURE — 99284 EMERGENCY DEPT VISIT MOD MDM: CPT | Performed by: PHYSICIAN ASSISTANT

## 2021-12-07 PROCEDURE — 81001 URINALYSIS AUTO W/SCOPE: CPT | Performed by: PHYSICIAN ASSISTANT

## 2021-12-07 PROCEDURE — 96361 HYDRATE IV INFUSION ADD-ON: CPT

## 2021-12-07 RX ORDER — PHENOL 1.4 %
600 AEROSOL, SPRAY (ML) MUCOUS MEMBRANE DAILY
COMMUNITY

## 2021-12-07 RX ORDER — HYDROMORPHONE HCL/PF 1 MG/ML
1 SYRINGE (ML) INJECTION ONCE
Status: DISCONTINUED | OUTPATIENT
Start: 2021-12-07 | End: 2021-12-07

## 2021-12-07 RX ORDER — ACETAMINOPHEN 325 MG/1
650 TABLET ORAL ONCE
Status: DISCONTINUED | OUTPATIENT
Start: 2021-12-07 | End: 2021-12-07 | Stop reason: HOSPADM

## 2021-12-07 RX ORDER — KETOROLAC TROMETHAMINE 30 MG/ML
30 INJECTION, SOLUTION INTRAMUSCULAR; INTRAVENOUS ONCE
Status: COMPLETED | OUTPATIENT
Start: 2021-12-07 | End: 2021-12-07

## 2021-12-07 RX ORDER — CEFTRIAXONE 1 G/50ML
1000 INJECTION, SOLUTION INTRAVENOUS ONCE
Status: COMPLETED | OUTPATIENT
Start: 2021-12-07 | End: 2021-12-07

## 2021-12-07 RX ORDER — CEPHALEXIN 500 MG/1
500 CAPSULE ORAL EVERY 12 HOURS SCHEDULED
Qty: 20 CAPSULE | Refills: 0 | Status: SHIPPED | OUTPATIENT
Start: 2021-12-07 | End: 2021-12-17

## 2021-12-07 RX ADMIN — IOHEXOL 100 ML: 350 INJECTION, SOLUTION INTRAVENOUS at 11:13

## 2021-12-07 RX ADMIN — SODIUM CHLORIDE 1000 ML: 0.9 INJECTION, SOLUTION INTRAVENOUS at 09:52

## 2021-12-07 RX ADMIN — KETOROLAC TROMETHAMINE 30 MG: 30 INJECTION, SOLUTION INTRAMUSCULAR; INTRAVENOUS at 10:11

## 2021-12-07 RX ADMIN — CEFTRIAXONE 1000 MG: 1 INJECTION, SOLUTION INTRAVENOUS at 13:26

## 2022-03-09 ENCOUNTER — HOSPITAL ENCOUNTER (EMERGENCY)
Facility: HOSPITAL | Age: 37
Discharge: HOME | End: 2022-03-09
Attending: EMERGENCY MEDICINE | Admitting: EMERGENCY MEDICINE
Payer: MEDICARE

## 2022-03-09 VITALS
BODY MASS INDEX: 56.83 KG/M2 | SYSTOLIC BLOOD PRESSURE: 122 MMHG | WEIGHT: 291.01 LBS | RESPIRATION RATE: 16 BRPM | DIASTOLIC BLOOD PRESSURE: 76 MMHG | TEMPERATURE: 97.2 F | HEART RATE: 75 BPM | OXYGEN SATURATION: 99 %

## 2022-03-09 DIAGNOSIS — K04.7 DENTAL INFECTION: Primary | ICD-10-CM

## 2022-03-09 PROCEDURE — 99283 EMERGENCY DEPT VISIT LOW MDM: CPT

## 2022-03-09 PROCEDURE — 99284 EMERGENCY DEPT VISIT MOD MDM: CPT | Performed by: PHYSICIAN ASSISTANT

## 2022-03-09 RX ORDER — KETOROLAC TROMETHAMINE 10 MG/1
10 TABLET, FILM COATED ORAL EVERY 6 HOURS PRN
Qty: 20 TABLET | Refills: 0 | Status: SHIPPED | OUTPATIENT
Start: 2022-03-09

## 2022-03-09 RX ORDER — CLINDAMYCIN HYDROCHLORIDE 150 MG/1
300 CAPSULE ORAL ONCE
Status: COMPLETED | OUTPATIENT
Start: 2022-03-09 | End: 2022-03-09

## 2022-03-09 RX ORDER — CLINDAMYCIN HYDROCHLORIDE 300 MG/1
300 CAPSULE ORAL 4 TIMES DAILY
Qty: 28 CAPSULE | Refills: 0 | Status: SHIPPED | OUTPATIENT
Start: 2022-03-09 | End: 2022-03-16

## 2022-03-09 RX ORDER — KETOROLAC TROMETHAMINE 30 MG/ML
15 INJECTION, SOLUTION INTRAMUSCULAR; INTRAVENOUS ONCE
Status: COMPLETED | OUTPATIENT
Start: 2022-03-09 | End: 2022-03-09

## 2022-03-09 RX ADMIN — CLINDAMYCIN HYDROCHLORIDE 300 MG: 150 CAPSULE ORAL at 21:26

## 2022-03-09 RX ADMIN — KETOROLAC TROMETHAMINE 15 MG: 30 INJECTION, SOLUTION INTRAMUSCULAR; INTRAVENOUS at 21:27

## 2022-03-10 NOTE — ED PROVIDER NOTES
History  Chief Complaint   Patient presents with    Facial Swelling     Facial swelling left side of face, started this morning  Took Tylenol for it last dose 3 tablets at 1500  Patient presents for an evaluation of L sided dental pain and facial swelling ongoing for the past day  Patient has not seen a dentist in over 5 years  She has tried taking Tylenol for symptoms prior to arrival with little relief  Denies any fevers, chills, vomiting, sore throat  Pain does radiate from her left side of jaw to her ear  No other complaints          Prior to Admission Medications   Prescriptions Last Dose Informant Patient Reported? Taking? albuterol (2 5 mg/3 mL) 0 083 % nebulizer solution   Yes No   Sig: Inhale 2 5 mg every 6 (six) hours as needed   albuterol (PROVENTIL HFA,VENTOLIN HFA) 90 mcg/act inhaler   No No   Sig: Inhale 2 puffs every 4 (four) hours as needed for wheezing   calcium carbonate (OS-KARINA) 600 MG tablet  Self Yes No   Sig: Take 600 mg by mouth daily   loratadine (CLARITIN) 10 mg tablet   No No   Sig: Take 1 tablet (10 mg total) by mouth daily      Facility-Administered Medications: None       Past Medical History:   Diagnosis Date    Asthma        Past Surgical History:   Procedure Laterality Date     SECTION      five     SECTION      CHOLECYSTECTOMY      GASTRECTOMY SLEEVE LAPAROSCOPIC  10/12/2021    TONSILLECTOMY         History reviewed  No pertinent family history  I have reviewed and agree with the history as documented  E-Cigarette/Vaping    E-Cigarette Use Never User      E-Cigarette/Vaping Substances     Social History     Tobacco Use    Smoking status: Current Every Day Smoker     Packs/day: 0 50     Types: Cigarettes    Smokeless tobacco: Never Used   Vaping Use    Vaping Use: Never used   Substance Use Topics    Alcohol use: Yes     Comment: occassionally    Drug use: No       Review of Systems   Constitutional: Negative for chills and fever     HENT: Positive for dental problem and facial swelling  Negative for congestion, ear pain and sore throat  Eyes: Negative for pain  Respiratory: Negative for cough and shortness of breath  Cardiovascular: Negative for chest pain  Gastrointestinal: Negative for abdominal pain, nausea and vomiting  Genitourinary: Negative for dysuria  Musculoskeletal: Negative for back pain  Skin: Negative for rash  Neurological: Negative for dizziness and numbness  Psychiatric/Behavioral: Negative for suicidal ideas  All other systems reviewed and are negative  Physical Exam  Physical Exam  Vitals reviewed  Constitutional:       Appearance: She is well-developed  HENT:      Head: Normocephalic and atraumatic  Right Ear: External ear normal       Left Ear: External ear normal       Nose: Nose normal       Mouth/Throat:      Mouth: Mucous membranes are moist       Dentition: Abnormal dentition  Dental tenderness and dental caries present  No gingival swelling or dental abscesses  Pharynx: Oropharynx is clear  Comments: Poor dentition noted throughout  Multiple dental caries  Mild L sided facial swelling noted  No abscess appreciated  Cardiovascular:      Rate and Rhythm: Normal rate and regular rhythm  Heart sounds: Normal heart sounds  Pulmonary:      Effort: Pulmonary effort is normal       Breath sounds: Normal breath sounds  Abdominal:      General: Bowel sounds are normal       Palpations: Abdomen is soft  Tenderness: There is no abdominal tenderness  Musculoskeletal:         General: Normal range of motion  Cervical back: Normal range of motion and neck supple  Skin:     General: Skin is warm and dry  Capillary Refill: Capillary refill takes less than 2 seconds  Neurological:      Mental Status: She is alert and oriented to person, place, and time     Psychiatric:         Behavior: Behavior normal          Vital Signs  ED Triage Vitals [03/09/22 2102] Temperature Pulse Respirations Blood Pressure SpO2   (!) 97 2 °F (36 2 °C) 75 16 122/76 99 %      Temp Source Heart Rate Source Patient Position - Orthostatic VS BP Location FiO2 (%)   Tympanic Monitor Sitting Left arm --      Pain Score       8           Vitals:    03/09/22 2102   BP: 122/76   Pulse: 75   Patient Position - Orthostatic VS: Sitting         Visual Acuity      ED Medications  Medications   ketorolac (TORADOL) injection 15 mg (15 mg Intramuscular Given 3/9/22 2127)   clindamycin (CLEOCIN) capsule 300 mg (300 mg Oral Given 3/9/22 2126)       Diagnostic Studies  Results Reviewed     None                 No orders to display              Procedures  Procedures         ED Course                                             MDM    Disposition  Final diagnoses:   Dental infection     Time reflects when diagnosis was documented in both MDM as applicable and the Disposition within this note     Time User Action Codes Description Comment    3/9/2022  9:16 PM Duffy, Juaquin Kanner Add [K04 7] Dental infection       ED Disposition     ED Disposition Condition Date/Time Comment    Discharge Stable Wed Mar 9, 2022  9:16 PM Tawanda Rogers discharge to home/self care              Follow-up Information     Follow up With Specialties Details Why Contact Info    [facility], [address] [place] [address]  [facility]  [facility] [id number]  [address] [place] [address]  [phone]          Discharge Medication List as of 3/9/2022  9:18 PM      START taking these medications    Details   clindamycin (CLEOCIN) 300 MG capsule Take 1 capsule (300 mg total) by mouth 4 (four) times a day for 7 days, Starting Wed 3/9/2022, Until Wed 3/16/2022, Normal      ketorolac (TORADOL) 10 mg tablet Take 1 tablet (10 mg total) by mouth every 6 (six) hours as needed for moderate pain, Starting Wed 3/9/2022, Normal         CONTINUE these medications which have NOT CHANGED    Details   albuterol (2 5 mg/3 mL) 0 083 % nebulizer solution Inhale 2 5 mg every 6 (six) hours as needed, Starting Thu 12/6/2018, Historical Med      albuterol (PROVENTIL HFA,VENTOLIN HFA) 90 mcg/act inhaler Inhale 2 puffs every 4 (four) hours as needed for wheezing, Starting Sun 3/1/2020, Print      calcium carbonate (OS-KARINA) 600 MG tablet Take 600 mg by mouth daily, Historical Med      loratadine (CLARITIN) 10 mg tablet Take 1 tablet (10 mg total) by mouth daily, Starting Tue 2/18/2020, Normal                 PDMP Review     None          ED Provider  Electronically Signed by           Donya Medeiros PA-C  03/09/22 1229

## 2022-03-10 NOTE — DISCHARGE INSTRUCTIONS
Please follow up with dentistry  I have provided you with a referral  Use medication as prescribed  Do not take Toradol while using other NSAIDs such as naproxen and ibuprofen  Please return to the ER with any worsening symptoms

## 2022-04-05 ENCOUNTER — HOSPITAL ENCOUNTER (EMERGENCY)
Facility: HOSPITAL | Age: 37
Discharge: HOME | End: 2022-04-05
Attending: EMERGENCY MEDICINE | Admitting: EMERGENCY MEDICINE
Payer: MEDICARE

## 2022-04-05 ENCOUNTER — APPOINTMENT (EMERGENCY)
Dept: RADIOLOGY | Facility: HOSPITAL | Age: 37
End: 2022-04-05
Payer: MEDICARE

## 2022-04-05 VITALS
SYSTOLIC BLOOD PRESSURE: 122 MMHG | BODY MASS INDEX: 55.54 KG/M2 | OXYGEN SATURATION: 98 % | WEIGHT: 284.39 LBS | DIASTOLIC BLOOD PRESSURE: 61 MMHG | RESPIRATION RATE: 16 BRPM | HEART RATE: 86 BPM | TEMPERATURE: 98 F

## 2022-04-05 DIAGNOSIS — S90.31XA CONTUSION OF RIGHT FOOT, INITIAL ENCOUNTER: Primary | ICD-10-CM

## 2022-04-05 PROCEDURE — 73610 X-RAY EXAM OF ANKLE: CPT

## 2022-04-05 PROCEDURE — 73630 X-RAY EXAM OF FOOT: CPT

## 2022-04-05 PROCEDURE — 99284 EMERGENCY DEPT VISIT MOD MDM: CPT | Performed by: EMERGENCY MEDICINE

## 2022-04-05 PROCEDURE — 99283 EMERGENCY DEPT VISIT LOW MDM: CPT

## 2022-04-05 NOTE — ED PROVIDER NOTES
History  Chief Complaint   Patient presents with    Ankle Pain     states a bottle of wine fell onto rt foot causing pain and a lump  states she took toradol at about 180     75-year-old female without significant past medical history presenting for evaluation of ankle and foot pain  Patient states several days ago a full bottle of wine fell onto her right foot  She has noted a lump on the top of her foot for several years but states it is now bigger and she has significant pain, especially with weight-bearing  Patient endorses a sharp shooting pain with ambulation in addition to a throbbing sensation in her mid foot  She denies paresthesias or focal weakness  She denies additional trauma or injury  Patient had a headache this morning that improved after she took Toradol, which also improved her foot pain  Prior to Admission Medications   Prescriptions Last Dose Informant Patient Reported? Taking? albuterol (2 5 mg/3 mL) 0 083 % nebulizer solution   Yes No   Sig: Inhale 2 5 mg every 6 (six) hours as needed   albuterol (PROVENTIL HFA,VENTOLIN HFA) 90 mcg/act inhaler   No No   Sig: Inhale 2 puffs every 4 (four) hours as needed for wheezing   calcium carbonate (OS-KARINA) 600 MG tablet  Self Yes No   Sig: Take 600 mg by mouth daily   ketorolac (TORADOL) 10 mg tablet   No No   Sig: Take 1 tablet (10 mg total) by mouth every 6 (six) hours as needed for moderate pain   loratadine (CLARITIN) 10 mg tablet   No No   Sig: Take 1 tablet (10 mg total) by mouth daily      Facility-Administered Medications: None       Past Medical History:   Diagnosis Date    Asthma        Past Surgical History:   Procedure Laterality Date     SECTION      five     SECTION      CHOLECYSTECTOMY      GASTRECTOMY SLEEVE LAPAROSCOPIC  10/12/2021    TONSILLECTOMY         History reviewed  No pertinent family history  I have reviewed and agree with the history as documented      E-Cigarette/Vaping    E-Cigarette Use Never User      E-Cigarette/Vaping Substances     Social History     Tobacco Use    Smoking status: Current Every Day Smoker     Packs/day: 0 25     Types: Cigarettes    Smokeless tobacco: Never Used   Vaping Use    Vaping Use: Never used   Substance Use Topics    Alcohol use: Yes     Comment: occassionally    Drug use: Yes     Types: Marijuana     Comment: medical       Review of Systems   Constitutional: Negative for chills and fever  HENT: Negative for congestion, rhinorrhea and sore throat  Eyes: Negative for pain, discharge and visual disturbance  Respiratory: Negative for cough and shortness of breath  Cardiovascular: Negative for chest pain, palpitations and leg swelling  Gastrointestinal: Negative for abdominal pain, diarrhea, nausea and vomiting  Musculoskeletal: Positive for arthralgias  Negative for myalgias  Skin: Negative for color change and rash  Neurological: Positive for headaches  Negative for dizziness, weakness, light-headedness and numbness  Hematological: Does not bruise/bleed easily  Physical Exam  Physical Exam  Vitals and nursing note reviewed  Constitutional:       General: She is not in acute distress  Appearance: Normal appearance  HENT:      Head: Normocephalic and atraumatic  Nose: Nose normal       Mouth/Throat:      Mouth: Mucous membranes are moist    Eyes:      General: No scleral icterus  Extraocular Movements: Extraocular movements intact  Conjunctiva/sclera: Conjunctivae normal       Pupils: Pupils are equal, round, and reactive to light  Cardiovascular:      Rate and Rhythm: Normal rate and regular rhythm  Pulmonary:      Effort: Pulmonary effort is normal  No respiratory distress  Breath sounds: No wheezing, rhonchi or rales  Abdominal:      General: There is no distension  Palpations: Abdomen is soft  Tenderness: There is no abdominal tenderness  There is no guarding or rebound     Musculoskeletal: Cervical back: Neck supple  Right knee: Ecchymosis (scattered around anterior knee) present  No swelling, deformity, bony tenderness or crepitus  No tenderness  Right lower leg: No swelling, deformity, tenderness or bony tenderness  No edema  Right ankle: No swelling, deformity or ecchymosis  Tenderness present over the medial malleolus and ATF ligament  No base of 5th metatarsal tenderness  Normal range of motion  Right foot: Normal range of motion and normal capillary refill  Swelling, tenderness (dorsal and plantar midfood) and bony tenderness present  No crepitus  Normal pulse  Comments: No tenderness to proximal fibular head   Skin:     General: Skin is warm and dry  Findings: No rash  Neurological:      General: No focal deficit present  Mental Status: She is alert and oriented to person, place, and time  Mental status is at baseline     Psychiatric:         Mood and Affect: Mood normal          Behavior: Behavior normal          Vital Signs  ED Triage Vitals [04/05/22 1104]   Temperature Pulse Respirations Blood Pressure SpO2   98 °F (36 7 °C) 86 16 122/61 98 %      Temp Source Heart Rate Source Patient Position - Orthostatic VS BP Location FiO2 (%)   Tympanic Monitor Sitting Left arm --      Pain Score       --           Vitals:    04/05/22 1104   BP: 122/61   Pulse: 86   Patient Position - Orthostatic VS: Sitting         Visual Acuity      ED Medications  Medications - No data to display    Diagnostic Studies  Results Reviewed     None                 XR ankle 3+ views RIGHT   ED Interpretation by Kirt Cooper MD (04/05 1201)   No fracture      XR foot 3+ views RIGHT   ED Interpretation by Kirt Cooper MD (04/05 1202)   No fracture                 Procedures  Procedures         ED Course                               SBIRT 20yo+      Most Recent Value   SBIRT (23 yo +)    In order to provide better care to our patients, we are screening all of our patients for alcohol and drug use  Would it be okay to ask you these screening questions? No Filed at: 04/05/2022 1119                    [facility]  Number of Diagnoses or Management Options  Contusion of right foot, initial encounter  Diagnosis management comments: 38-year-old female presenting for evaluation of a right foot injury  Differential diagnosis includes acute fracture, dislocation, ligamentous sprain, contusion  X-rays were obtained of the right foot and ankle, which per my interpretation, are negative for acute fracture  Patient has an intact neurovascular examination is ambulatory  I suspect contusion to her foot secondary to traumatic injury  Patient is appropriate for discharge home with this time with outpatient primary care follow-up  Counseled on symptomatic management at home including ice, elevation, and analgesia  Return precautions discussed  Patient in agreement and understanding of these instructions  Disposition  Final diagnoses:   Contusion of right foot, initial encounter     Time reflects when diagnosis was documented in both MDM as applicable and the Disposition within this note     Time User Action Codes Description Comment    4/5/2022 12:02 PM Malu Wheeler Add [S90 31XA] Contusion of right foot, initial encounter       ED Disposition     ED Disposition Condition Date/Time Comment    Discharge Stable Tue Apr 5, 2022 12:02 PM Ary Mchugh discharge to home/self care              Follow-up Information     Follow up With Specialties Details Why Contact Info    Christiaon Morocho MD Family Medicine Schedule an appointment as soon as possible for a visit   [address] [place], [place] [id number] [phone]            Discharge Medication List as of 4/5/2022 12:03 PM      CONTINUE these medications which have NOT CHANGED    Details   albuterol (2 5 mg/3 mL) 0 083 % nebulizer solution Inhale 2 5 mg every 6 (six) hours as needed, Starting Thu 12/6/2018, Historical Med      albuterol (PROVENTIL HFA,VENTOLIN HFA) 90 mcg/act inhaler Inhale 2 puffs every 4 (four) hours as needed for wheezing, Starting Sun 3/1/2020, Print      calcium carbonate (OS-KARINA) 600 MG tablet Take 600 mg by mouth daily, Historical Med      ketorolac (TORADOL) 10 mg tablet Take 1 tablet (10 mg total) by mouth every 6 (six) hours as needed for moderate pain, Starting Wed 3/9/2022, Normal      loratadine (CLARITIN) 10 mg tablet Take 1 tablet (10 mg total) by mouth daily, Starting Tue 2/18/2020, Normal             No discharge procedures on file      PDMP Review     None          ED Provider  Electronically Signed by           Cyndy Pat MD  04/05/22 0834

## 2022-05-03 ENCOUNTER — HOSPITAL ENCOUNTER (EMERGENCY)
Facility: HOSPITAL | Age: 37
Discharge: HOME | End: 2022-05-03
Attending: EMERGENCY MEDICINE | Admitting: EMERGENCY MEDICINE
Payer: MEDICARE

## 2022-05-03 VITALS
HEART RATE: 73 BPM | DIASTOLIC BLOOD PRESSURE: 68 MMHG | TEMPERATURE: 98.2 F | HEIGHT: 60 IN | RESPIRATION RATE: 18 BRPM | OXYGEN SATURATION: 98 % | WEIGHT: 278 LBS | SYSTOLIC BLOOD PRESSURE: 112 MMHG | BODY MASS INDEX: 54.58 KG/M2

## 2022-05-03 DIAGNOSIS — R51.9 HEADACHE: Primary | ICD-10-CM

## 2022-05-03 PROCEDURE — 99283 EMERGENCY DEPT VISIT LOW MDM: CPT

## 2022-05-03 PROCEDURE — 96361 HYDRATE IV INFUSION ADD-ON: CPT

## 2022-05-03 PROCEDURE — 99284 EMERGENCY DEPT VISIT MOD MDM: CPT | Performed by: EMERGENCY MEDICINE

## 2022-05-03 PROCEDURE — 96374 THER/PROPH/DIAG INJ IV PUSH: CPT

## 2022-05-03 PROCEDURE — 96375 TX/PRO/DX INJ NEW DRUG ADDON: CPT

## 2022-05-03 RX ORDER — KETOROLAC TROMETHAMINE 30 MG/ML
15 INJECTION, SOLUTION INTRAMUSCULAR; INTRAVENOUS ONCE
Status: COMPLETED | OUTPATIENT
Start: 2022-05-03 | End: 2022-05-03

## 2022-05-03 RX ORDER — METOCLOPRAMIDE HYDROCHLORIDE 5 MG/ML
10 INJECTION INTRAMUSCULAR; INTRAVENOUS ONCE
Status: COMPLETED | OUTPATIENT
Start: 2022-05-03 | End: 2022-05-03

## 2022-05-03 RX ORDER — DIPHENHYDRAMINE HYDROCHLORIDE 50 MG/ML
25 INJECTION INTRAMUSCULAR; INTRAVENOUS ONCE
Status: COMPLETED | OUTPATIENT
Start: 2022-05-03 | End: 2022-05-03

## 2022-05-03 RX ORDER — KETOROLAC TROMETHAMINE 30 MG/ML
30 INJECTION, SOLUTION INTRAMUSCULAR; INTRAVENOUS ONCE
Status: DISCONTINUED | OUTPATIENT
Start: 2022-05-03 | End: 2022-05-03

## 2022-05-03 RX ADMIN — SODIUM CHLORIDE 1000 ML: 0.9 INJECTION, SOLUTION INTRAVENOUS at 12:07

## 2022-05-03 RX ADMIN — DIPHENHYDRAMINE HYDROCHLORIDE 25 MG: 50 INJECTION, SOLUTION INTRAMUSCULAR; INTRAVENOUS at 12:14

## 2022-05-03 RX ADMIN — METOCLOPRAMIDE 10 MG: 5 INJECTION, SOLUTION INTRAMUSCULAR; INTRAVENOUS at 12:17

## 2022-05-03 RX ADMIN — KETOROLAC TROMETHAMINE 15 MG: 30 INJECTION, SOLUTION INTRAMUSCULAR; INTRAVENOUS at 12:13

## 2022-05-03 NOTE — ED PROVIDER NOTES
History  Chief Complaint   Patient presents with    Headache     states has seasonal allergies and has had head congestion and allergy symptoms since early spring; has been taking tylenol sinsus but it hasn't been helping; past 2 days, light headed and head pressure, sneezing and right ear pressure  Patient is a 75-year-old female who presents with a 4 day history of frontal headache  Constant  Radiates to back and down neck  Took tyelnol sinus without any relief   +congestion  H/o headaches in the past, but none recently  No n/v/d  No numbness/weakness  Prior to Admission Medications   Prescriptions Last Dose Informant Patient Reported? Taking? albuterol (2 5 mg/3 mL) 0 083 % nebulizer solution   Yes No   Sig: Inhale 2 5 mg every 6 (six) hours as needed   albuterol (PROVENTIL HFA,VENTOLIN HFA) 90 mcg/act inhaler   No No   Sig: Inhale 2 puffs every 4 (four) hours as needed for wheezing   calcium carbonate (OS-KARINA) 600 MG tablet  Self Yes No   Sig: Take 600 mg by mouth daily   ketorolac (TORADOL) 10 mg tablet   No No   Sig: Take 1 tablet (10 mg total) by mouth every 6 (six) hours as needed for moderate pain   loratadine (CLARITIN) 10 mg tablet   No No   Sig: Take 1 tablet (10 mg total) by mouth daily      Facility-Administered Medications: None       Past Medical History:   Diagnosis Date    Asthma     Seasonal allergies        Past Surgical History:   Procedure Laterality Date     SECTION      five     SECTION      CHOLECYSTECTOMY      GASTRECTOMY SLEEVE LAPAROSCOPIC  10/12/2021    TONSILLECTOMY         History reviewed  No pertinent family history  I have reviewed and agree with the history as documented      E-Cigarette/Vaping    E-Cigarette Use Never User      E-Cigarette/Vaping Substances     Social History     Tobacco Use    Smoking status: Current Every Day Smoker     Packs/day: 0 25     Types: Cigarettes    Smokeless tobacco: Never Used   Vaping Use    Vaping Use: Never used   Substance Use Topics    Alcohol use: Yes     Comment: occassionally    Drug use: Yes     Types: Marijuana     Comment: medical       Review of Systems   Constitutional: Negative  HENT: Negative  Eyes: Positive for photophobia  Respiratory: Negative  Cardiovascular: Negative  Gastrointestinal: Negative  Endocrine: Negative  Genitourinary: Negative  Musculoskeletal: Negative  Skin: Negative  Allergic/Immunologic: Negative  Neurological: Positive for headaches  Hematological: Negative  Psychiatric/Behavioral: Negative  All other systems reviewed and are negative  Physical Exam  Physical Exam  Vitals and nursing note reviewed  Constitutional:       General: She is not in acute distress  Appearance: She is well-developed  She is obese  HENT:      Head: Normocephalic and atraumatic  Nose: Congestion present  Mouth/Throat:      Mouth: Mucous membranes are moist       Pharynx: Oropharynx is clear  Eyes:      Extraocular Movements: Extraocular movements intact  Conjunctiva/sclera: Conjunctivae normal       Pupils: Pupils are equal, round, and reactive to light  Comments: +photophobia on exam     Cardiovascular:      Rate and Rhythm: Normal rate and regular rhythm  Pulses: Normal pulses  Heart sounds: Normal heart sounds  Pulmonary:      Effort: Pulmonary effort is normal       Breath sounds: Normal breath sounds  Abdominal:      General: Bowel sounds are normal  There is no distension  Palpations: Abdomen is soft  Tenderness: There is no abdominal tenderness  There is no guarding or rebound  Musculoskeletal:         General: Normal range of motion  Cervical back: Normal range of motion and neck supple  Skin:     General: Skin is warm and dry  Capillary Refill: Capillary refill takes less than 2 seconds  Neurological:      General: No focal deficit present        Mental Status: She is alert and oriented to person, place, and time  Mental status is at baseline  Cranial Nerves: No cranial nerve deficit  Sensory: No sensory deficit  Motor: No weakness  Gait: Gait normal    Psychiatric:         Mood and Affect: Mood normal          Behavior: Behavior normal          Vital Signs  ED Triage Vitals [05/03/22 1126]   Temperature Pulse Respirations Blood Pressure SpO2   98 2 °F (36 8 °C) 73 18 112/68 98 %      Temp Source Heart Rate Source Patient Position - Orthostatic VS BP Location FiO2 (%)   Oral Monitor Sitting Left arm --      Pain Score       8           Vitals:    05/03/22 1126   BP: 112/68   Pulse: 73   Patient Position - Orthostatic VS: Sitting         Visual Acuity      ED Medications  Medications   sodium chloride 0 9 % bolus 1,000 mL (1,000 mL Intravenous New Bag 5/3/22 1207)   diphenhydrAMINE (BENADRYL) injection 25 mg (25 mg Intravenous Given 5/3/22 1214)   metoclopramide (REGLAN) injection 10 mg (10 mg Intravenous Given 5/3/22 1217)   ketorolac (TORADOL) injection 15 mg (15 mg Intravenous Given 5/3/22 1213)       Diagnostic Studies  Results Reviewed     None                 No orders to display              Procedures  Procedures         ED Course  ED Course as of 05/03/22 1329   Tue May 03, 2022   1328 Feeling improved  Will dc  MDM  Number of Diagnoses or Management Options     Amount and/or Complexity of Data Reviewed  Review and summarize past medical records: yes        Disposition  Final diagnoses:   Headache     Time reflects when diagnosis was documented in both MDM as applicable and the Disposition within this note     Time User Action Codes Description Comment    5/3/2022  1:28 PM Dae Lopes Add [R51 9] Headache       ED Disposition     ED Disposition Condition Date/Time Comment    Discharge Stable Tue May 3, 2022  1:28 PM Gaby Irvin discharge to home/self care              Follow-up Information     Follow up With Specialties Details Why Contact Info    Jameson Camarena MD [facility]   [address] [facility] [address]  [address]  [address] [facility] [address]  [phone]            Patient's Medications   Discharge Prescriptions    No medications on file       No discharge procedures on file      PDMP Review     None          ED Provider  Electronically Signed by           Terri Veras MD  05/03/22 9145

## 2022-05-03 NOTE — Clinical Note
Laure Long was seen and treated in our emergency department on 5/3/2022  Diagnosis:     Ying Donahue  may return to work on return date  She may return on this date: 05/04/2022         If you have any questions or concerns, please don't hesitate to call        Catherine Paredes MD    ______________________________           _______________          _______________  Hospital Representative                              Date                                Time

## 2022-05-16 ENCOUNTER — HOSPITAL ENCOUNTER (EMERGENCY)
Facility: HOSPITAL | Age: 37
Discharge: HOME | End: 2022-05-16
Attending: EMERGENCY MEDICINE
Payer: MEDICARE

## 2022-05-16 VITALS
BODY MASS INDEX: 53.91 KG/M2 | OXYGEN SATURATION: 98 % | RESPIRATION RATE: 18 BRPM | TEMPERATURE: 98.4 F | HEART RATE: 79 BPM | SYSTOLIC BLOOD PRESSURE: 127 MMHG | WEIGHT: 276.02 LBS | DIASTOLIC BLOOD PRESSURE: 67 MMHG

## 2022-05-16 DIAGNOSIS — R60.0 PEDAL EDEMA: Primary | ICD-10-CM

## 2022-05-16 LAB
ALBUMIN SERPL BCP-MCNC: 3.9 G/DL (ref 3–5.2)
ALP SERPL-CCNC: 125 U/L (ref 43–122)
ALT SERPL W P-5'-P-CCNC: 28 U/L
ANION GAP SERPL CALCULATED.3IONS-SCNC: 8 MMOL/L (ref 5–14)
AST SERPL W P-5'-P-CCNC: 28 U/L (ref 14–36)
BASOPHILS # BLD AUTO: 0.03 THOUSANDS/ΜL (ref 0–0.1)
BASOPHILS NFR BLD AUTO: 0 % (ref 0–1)
BILIRUB SERPL-MCNC: 0.62 MG/DL
BUN SERPL-MCNC: 12 MG/DL (ref 5–25)
CALCIUM SERPL-MCNC: 8.9 MG/DL (ref 8.4–10.2)
CHLORIDE SERPL-SCNC: 107 MMOL/L (ref 97–108)
CO2 SERPL-SCNC: 24 MMOL/L (ref 22–30)
CREAT SERPL-MCNC: 0.5 MG/DL (ref 0.6–1.2)
D DIMER PPP FEU-MCNC: 0.44 UG/ML FEU
EOSINOPHIL # BLD AUTO: 0.03 THOUSAND/ΜL (ref 0–0.61)
EOSINOPHIL NFR BLD AUTO: 0 % (ref 0–6)
ERYTHROCYTE [DISTWIDTH] IN BLOOD BY AUTOMATED COUNT: 14.9 % (ref 11.6–15.1)
EXT PREG TEST URINE: NEGATIVE
EXT. CONTROL ED NAV: NORMAL
GFR SERPL CREATININE-BSD FRML MDRD: 122 ML/MIN/1.73SQ M
GLUCOSE SERPL-MCNC: 91 MG/DL (ref 70–99)
HCT VFR BLD AUTO: 43.3 % (ref 34.8–46.1)
HGB BLD-MCNC: 13.8 G/DL (ref 11.5–15.4)
IMM GRANULOCYTES # BLD AUTO: 0.03 THOUSAND/UL (ref 0–0.2)
IMM GRANULOCYTES NFR BLD AUTO: 0 % (ref 0–2)
LYMPHOCYTES # BLD AUTO: 3.13 THOUSANDS/ΜL (ref 0.6–4.47)
LYMPHOCYTES NFR BLD AUTO: 28 % (ref 14–44)
MCH RBC QN AUTO: 29.2 PG (ref 26.8–34.3)
MCHC RBC AUTO-ENTMCNC: 31.9 G/DL (ref 31.4–37.4)
MCV RBC AUTO: 92 FL (ref 82–98)
MONOCYTES # BLD AUTO: 0.73 THOUSAND/ΜL (ref 0.17–1.22)
MONOCYTES NFR BLD AUTO: 6 % (ref 4–12)
NEUTROPHILS # BLD AUTO: 7.45 THOUSANDS/ΜL (ref 1.85–7.62)
NEUTS SEG NFR BLD AUTO: 66 % (ref 43–75)
NRBC BLD AUTO-RTO: 0 /100 WBCS
PLATELET # BLD AUTO: 302 THOUSANDS/UL (ref 149–390)
PMV BLD AUTO: 11.6 FL (ref 8.9–12.7)
POTASSIUM SERPL-SCNC: 3.4 MMOL/L (ref 3.6–5)
PROT SERPL-MCNC: 7.1 G/DL (ref 5.9–8.4)
RBC # BLD AUTO: 4.73 MILLION/UL (ref 3.81–5.12)
SODIUM SERPL-SCNC: 139 MMOL/L (ref 137–147)
WBC # BLD AUTO: 11.4 THOUSAND/UL (ref 4.31–10.16)

## 2022-05-16 PROCEDURE — 96372 THER/PROPH/DIAG INJ SC/IM: CPT

## 2022-05-16 PROCEDURE — 81025 URINE PREGNANCY TEST: CPT | Performed by: PHYSICIAN ASSISTANT

## 2022-05-16 PROCEDURE — 99283 EMERGENCY DEPT VISIT LOW MDM: CPT

## 2022-05-16 PROCEDURE — 36415 COLL VENOUS BLD VENIPUNCTURE: CPT | Performed by: PHYSICIAN ASSISTANT

## 2022-05-16 PROCEDURE — 85379 FIBRIN DEGRADATION QUANT: CPT | Performed by: PHYSICIAN ASSISTANT

## 2022-05-16 PROCEDURE — 99284 EMERGENCY DEPT VISIT MOD MDM: CPT | Performed by: PHYSICIAN ASSISTANT

## 2022-05-16 PROCEDURE — 80053 COMPREHEN METABOLIC PANEL: CPT | Performed by: PHYSICIAN ASSISTANT

## 2022-05-16 PROCEDURE — 85025 COMPLETE CBC W/AUTO DIFF WBC: CPT | Performed by: PHYSICIAN ASSISTANT

## 2022-05-16 RX ORDER — NAPROXEN 500 MG/1
500 TABLET ORAL 2 TIMES DAILY WITH MEALS
Qty: 30 TABLET | Refills: 0 | Status: SHIPPED | OUTPATIENT
Start: 2022-05-16 | End: 2022-05-16 | Stop reason: ALTCHOICE

## 2022-05-16 RX ORDER — METOCLOPRAMIDE HYDROCHLORIDE 5 MG/ML
10 INJECTION INTRAMUSCULAR; INTRAVENOUS ONCE
Status: DISCONTINUED | OUTPATIENT
Start: 2022-05-16 | End: 2022-05-16

## 2022-05-16 RX ORDER — METOCLOPRAMIDE 10 MG/1
10 TABLET ORAL ONCE
Status: COMPLETED | OUTPATIENT
Start: 2022-05-16 | End: 2022-05-16

## 2022-05-16 RX ORDER — DIPHENHYDRAMINE HCL 25 MG
25 TABLET ORAL ONCE
Status: COMPLETED | OUTPATIENT
Start: 2022-05-16 | End: 2022-05-16

## 2022-05-16 RX ORDER — DIPHENHYDRAMINE HYDROCHLORIDE 50 MG/ML
25 INJECTION INTRAMUSCULAR; INTRAVENOUS ONCE
Status: DISCONTINUED | OUTPATIENT
Start: 2022-05-16 | End: 2022-05-16

## 2022-05-16 RX ORDER — KETOROLAC TROMETHAMINE 30 MG/ML
30 INJECTION, SOLUTION INTRAMUSCULAR; INTRAVENOUS ONCE
Status: COMPLETED | OUTPATIENT
Start: 2022-05-16 | End: 2022-05-16

## 2022-05-16 RX ORDER — KETOROLAC TROMETHAMINE 30 MG/ML
15 INJECTION, SOLUTION INTRAMUSCULAR; INTRAVENOUS ONCE
Status: DISCONTINUED | OUTPATIENT
Start: 2022-05-16 | End: 2022-05-16

## 2022-05-16 RX ORDER — ACETAMINOPHEN 500 MG
500 TABLET ORAL EVERY 6 HOURS PRN
Qty: 30 TABLET | Refills: 0 | Status: SHIPPED | OUTPATIENT
Start: 2022-05-16

## 2022-05-16 RX ADMIN — DIPHENHYDRAMINE HCL 25 MG: 25 TABLET ORAL at 21:24

## 2022-05-16 RX ADMIN — SODIUM CHLORIDE 1000 ML: 0.9 INJECTION, SOLUTION INTRAVENOUS at 21:08

## 2022-05-16 RX ADMIN — KETOROLAC TROMETHAMINE 30 MG: 30 INJECTION, SOLUTION INTRAMUSCULAR; INTRAVENOUS at 21:25

## 2022-05-16 RX ADMIN — METOCLOPRAMIDE 10 MG: 10 TABLET ORAL at 21:24

## 2022-05-17 NOTE — ED PROVIDER NOTES
History  Chief Complaint   Patient presents with    Leg Swelling    Headache     Pt reports waking up with R foot/ankle swelling that is tender to the touch, denies any R calf tenderness  Reports this happened in the past but not since her GB surgery in October  55-year-old female without significant past medical history presents today with no complaints  Patient tells me for the past 2 days she noted worsening right calf pain and swelling as well as a recurrent headache  Patient has had multiple similar headaches in the past and denies sudden onset or worst headache of her life sensation  Has not taken any medications prior to arrival   Denies recent travel or history of blood clot  Denies chest pain or shortness of breath  Denies any other complaints       History provided by:  Patient   used: No        Prior to Admission Medications   Prescriptions Last Dose Informant Patient Reported? Taking? albuterol (2 5 mg/3 mL) 0 083 % nebulizer solution   Yes No   Sig: Inhale 2 5 mg every 6 (six) hours as needed   albuterol (PROVENTIL HFA,VENTOLIN HFA) 90 mcg/act inhaler   No No   Sig: Inhale 2 puffs every 4 (four) hours as needed for wheezing   calcium carbonate (OS-KARINA) 600 MG tablet  Self Yes No   Sig: Take 600 mg by mouth daily   ketorolac (TORADOL) 10 mg tablet   No No   Sig: Take 1 tablet (10 mg total) by mouth every 6 (six) hours as needed for moderate pain   loratadine (CLARITIN) 10 mg tablet   No No   Sig: Take 1 tablet (10 mg total) by mouth daily      Facility-Administered Medications: None       Past Medical History:   Diagnosis Date    Asthma     Seasonal allergies        Past Surgical History:   Procedure Laterality Date     SECTION      five     SECTION      CHOLECYSTECTOMY      GASTRECTOMY SLEEVE LAPAROSCOPIC  10/12/2021    TONSILLECTOMY         History reviewed  No pertinent family history    I have reviewed and agree with the history as documented  E-Cigarette/Vaping    E-Cigarette Use Never User      E-Cigarette/Vaping Substances     Social History     Tobacco Use    Smoking status: Current Every Day Smoker     Packs/day: 0 25     Types: Cigarettes    Smokeless tobacco: Never Used   Vaping Use    Vaping Use: Never used   Substance Use Topics    Alcohol use: Yes     Comment: occassionally    Drug use: Yes     Types: Marijuana     Comment: medical       Review of Systems   Constitutional: Negative  Negative for chills and fatigue  HENT: Negative for ear pain and sore throat  Eyes: Negative for photophobia and redness  Respiratory: Negative for apnea, cough and shortness of breath  Cardiovascular: Positive for leg swelling  Negative for chest pain  Gastrointestinal: Negative for abdominal pain, nausea and vomiting  Genitourinary: Negative for dysuria  Musculoskeletal: Negative for arthralgias, neck pain and neck stiffness  Skin: Negative for rash  Neurological: Positive for headaches  Negative for dizziness, tremors, syncope and weakness  Psychiatric/Behavioral: Negative for suicidal ideas  Physical Exam  Physical Exam  Constitutional:       General: She is not in acute distress  Appearance: She is well-developed  She is not diaphoretic  Eyes:      Pupils: Pupils are equal, round, and reactive to light  Cardiovascular:      Rate and Rhythm: Normal rate and regular rhythm  Pulmonary:      Effort: Pulmonary effort is normal  No respiratory distress  Breath sounds: Normal breath sounds  Abdominal:      General: Bowel sounds are normal  There is no distension  Palpations: Abdomen is soft  Musculoskeletal:         General: Normal range of motion  Cervical back: Normal range of motion and neck supple  Right lower leg: Edema present  Comments: Mild 1+ non pitting right lower extremity edema dorsalis pedis pulse 2 +  Positive Homans  No palpable cord     Skin:     General: Skin is warm and dry  Neurological:      Mental Status: She is alert and oriented to person, place, and time           Vital Signs  ED Triage Vitals   Temperature Pulse Respirations Blood Pressure SpO2   05/16/22 2016 05/16/22 2016 05/16/22 2016 05/16/22 2016 05/16/22 2016   98 4 °F (36 9 °C) 79 18 127/67 98 %      Temp Source Heart Rate Source Patient Position - Orthostatic VS BP Location FiO2 (%)   05/16/22 2016 05/16/22 2016 05/16/22 2016 05/16/22 2016 --   Tympanic Monitor Sitting Left arm       Pain Score       05/16/22 2125       8           Vitals:    05/16/22 2016   BP: 127/67   Pulse: 79   Patient Position - Orthostatic VS: Sitting         Visual Acuity      ED Medications  Medications   sodium chloride 0 9 % bolus 1,000 mL (0 mL Intravenous Stopped 5/16/22 2120)   diphenhydrAMINE (BENADRYL) tablet 25 mg (25 mg Oral Given 5/16/22 2124)   metoclopramide (REGLAN) tablet 10 mg (10 mg Oral Given 5/16/22 2124)   ketorolac (TORADOL) injection 30 mg (30 mg Intramuscular Given 5/16/22 2125)       Diagnostic Studies  Results Reviewed     Procedure Component Value Units Date/Time    Comprehensive metabolic panel [[id number]]  (Abnormal) Collected: 05/16/22 2116    Lab Status: Final result Specimen: Blood from Arm, Left Updated: 05/16/22 2135     Sodium 139 mmol/L      Potassium 3 4 mmol/L      Chloride 107 mmol/L      CO2 24 mmol/L      ANION GAP 8 mmol/L      BUN 12 mg/dL      Creatinine 0 50 mg/dL      Glucose 91 mg/dL      Calcium 8 9 mg/dL      AST 28 U/L      ALT 28 U/L      Alkaline Phosphatase 125 U/L      Total Protein 7 1 g/dL      Albumin 3 9 g/dL      Total Bilirubin 0 62 mg/dL      eGFR 122 ml/min/1 73sq m     Narrative:      [facility] guidelines for Chronic Kidney Disease (CKD):     Stage 1 with normal or high GFR (GFR > 90 mL/min/1 73 square meters)    Stage 2 Mild CKD (GFR = 60-89 mL/min/1 73 square meters)    Stage 3A Moderate CKD (GFR = 45-59 mL/min/1 73 square meters)    Stage 3B Moderate CKD (GFR = 30-44 mL/min/1 73 square meters)    Stage 4 Severe CKD (GFR = 15-29 mL/min/1 73 square meters)    Stage 5 End Stage CKD (GFR <15 mL/min/1 73 square meters)  Note: GFR calculation is accurate only with a steady state creatinine    D-dimer, quantitative [[id number]]  (Normal) Collected: 05/16/22 2116    Lab Status: Final result Specimen: Blood from Arm, Left Updated: 05/16/22 2134     D-Dimer, Quant 0 44 ug/ml FEU     CBC and differential [[id number]]  (Abnormal) Collected: 05/16/22 2116    Lab Status: Final result Specimen: Blood from Arm, Left Updated: 05/16/22 2123     WBC 11 40 Thousand/uL      RBC 4 73 Million/uL      Hemoglobin 13 8 g/dL      Hematocrit 43 3 %      MCV 92 fL      MCH 29 2 pg      MCHC 31 9 g/dL      RDW 14 9 %      MPV 11 6 fL      Platelets 695 Thousands/uL      nRBC 0 /100 WBCs      Neutrophils Relative 66 %      Immat GRANS % 0 %      Lymphocytes Relative 28 %      Monocytes Relative 6 %      Eosinophils Relative 0 %      Basophils Relative 0 %      Neutrophils Absolute 7 45 Thousands/µL      Immature Grans Absolute 0 03 Thousand/uL      Lymphocytes Absolute 3 13 Thousands/µL      Monocytes Absolute 0 73 Thousand/µL      Eosinophils Absolute 0 03 Thousand/µL      Basophils Absolute 0 03 Thousands/µL     POCT pregnancy, urine [[id number]]  (Normal) Resulted: 05/16/22 2045    Lab Status: Final result Updated: 05/16/22 2045     EXT PREG TEST UR (Ref: Negative) negative     Control valid                 No orders to display              Procedures  Procedures         ED Course                                             MDM  Number of Diagnoses or Management Options  Pedal edema: new and does not require workup  Diagnosis management comments: Patient had complete resolution of headache with medications given in the emergency department    No focal neurologic deficits on exam   Patient was educated on supportive care and return precautions and agrees to follow-up with her primary care doctor  Patient had negative D-dimer in the emergency department with low concern for acute coagulopathy  Symptoms thought to be related to dependent edema  Patient was prescribed anti-inflammatories and given compression stockings educated on supportive care and return precautions  Agrees to follow-up with her primary care doctor  Ambulated in the department  Amount and/or Complexity of Data Reviewed  Clinical lab tests: ordered and reviewed    Risk of Complications, Morbidity, and/or Mortality  Presenting problems: moderate  Diagnostic procedures: moderate  Management options: moderate    Patient Progress  Patient progress: stable      Disposition  Final diagnoses:   Pedal edema     Time reflects when diagnosis was documented in both MDM as applicable and the Disposition within this note     Time User Action Codes Description Comment    5/16/2022 10:12 PM Sundeep Clayton Add [R60 0] Pedal edema       ED Disposition     ED Disposition   Discharge    Condition   Stable    Date/Time   Mon May 16, 2022 10:12 PM    [address] [place] [address] discharge to home/self care                 Follow-up Information     Follow up With Specialties Details Why Contact Info    Flower Simmons MD Family Medicine Call  As needed [address] [place] [id number] [phone]            Discharge Medication List as of 5/16/2022 10:22 PM      START taking these medications    Details   acetaminophen (TYLENOL) 500 mg tablet Take 1 tablet (500 mg total) by mouth every 6 (six) hours as needed for moderate pain, Starting Mon 5/16/2022, Normal         CONTINUE these medications which have NOT CHANGED    Details   albuterol (2 5 mg/3 mL) 0 083 % nebulizer solution Inhale 2 5 mg every 6 (six) hours as needed, Starting Thu 12/6/2018, Historical Med      albuterol (PROVENTIL HFA,VENTOLIN HFA) 90 mcg/act inhaler Inhale 2 puffs every 4 (four) hours as needed for wheezing, Starting Sun 3/1/2020, Print      calcium carbonate (OS-KARINA) 600 MG tablet Take 600 mg by mouth daily, Historical Med      ketorolac (TORADOL) 10 mg tablet Take 1 tablet (10 mg total) by mouth every 6 (six) hours as needed for moderate pain, Starting Wed 3/9/2022, Normal      loratadine (CLARITIN) 10 mg tablet Take 1 tablet (10 mg total) by mouth daily, Starting Tue 2/18/2020, Normal             Outpatient Discharge Orders   Compression Stocking       PDMP Review     None          ED Provider  Electronically Signed by           Vijay Hoover PA-C  05/16/22 3164

## 2022-05-19 ENCOUNTER — HOSPITAL ENCOUNTER (EMERGENCY)
Facility: HOSPITAL | Age: 37
Discharge: HOME | End: 2022-05-19
Attending: EMERGENCY MEDICINE
Payer: MEDICARE

## 2022-05-19 ENCOUNTER — APPOINTMENT (EMERGENCY)
Dept: RADIOLOGY | Facility: HOSPITAL | Age: 37
End: 2022-05-19
Payer: MEDICARE

## 2022-05-19 VITALS
RESPIRATION RATE: 18 BRPM | WEIGHT: 273.37 LBS | HEART RATE: 78 BPM | DIASTOLIC BLOOD PRESSURE: 84 MMHG | TEMPERATURE: 98.3 F | SYSTOLIC BLOOD PRESSURE: 139 MMHG | BODY MASS INDEX: 53.39 KG/M2 | OXYGEN SATURATION: 98 %

## 2022-05-19 DIAGNOSIS — M79.671 FOOT PAIN, RIGHT: Primary | ICD-10-CM

## 2022-05-19 DIAGNOSIS — M79.89 FOOT SWELLING: ICD-10-CM

## 2022-05-19 DIAGNOSIS — M19.90 INFLAMMATORY ARTHRITIS: ICD-10-CM

## 2022-05-19 PROCEDURE — 99283 EMERGENCY DEPT VISIT LOW MDM: CPT

## 2022-05-19 PROCEDURE — 99284 EMERGENCY DEPT VISIT MOD MDM: CPT | Performed by: EMERGENCY MEDICINE

## 2022-05-19 PROCEDURE — 73630 X-RAY EXAM OF FOOT: CPT

## 2022-05-19 RX ORDER — PREDNISONE 20 MG/1
20 TABLET ORAL DAILY
Qty: 5 TABLET | Refills: 0 | Status: SHIPPED | OUTPATIENT
Start: 2022-05-19 | End: 2022-05-24

## 2022-05-19 NOTE — ED PROVIDER NOTES
History  Chief Complaint   Patient presents with    Foot Pain     Right foot pain and swelling radiating into lower leg x4 days  Seen at [address] [place] [address] a few days ago  Patient is a 43 y/o female who presents complaining of right foot swelling and pain  She relates that approximately 1 5 months ago she dropped a wine bottle on her right foot which lead to swelling and pain  She states that that healed and eventually her right foot went back to normal  She reports going to the emergency department 3 days ago for foot swelling and was Rx'd compression stockings which she did not get or use  The pain was consistent since that time which prompted her to come to the ED today  She reports putting weight on it is painful but denies any weakness or neurovascular deficits to the area  She denies any recent trauma to the area  She states that she has taken tylenol for the pain and that it has not helped her symptoms  She denies any N/V/F/CP/SOB and has no further complaints at this time  Prior to Admission Medications   Prescriptions Last Dose Informant Patient Reported?  Taking?   acetaminophen (TYLENOL) 500 mg tablet   No No   Sig: Take 1 tablet (500 mg total) by mouth every 6 (six) hours as needed for moderate pain   albuterol (2 5 mg/3 mL) 0 083 % nebulizer solution   Yes No   Sig: Inhale 2 5 mg every 6 (six) hours as needed   albuterol (PROVENTIL HFA,VENTOLIN HFA) 90 mcg/act inhaler   No No   Sig: Inhale 2 puffs every 4 (four) hours as needed for wheezing   calcium carbonate (OS-KARINA) 600 MG tablet  Self Yes No   Sig: Take 600 mg by mouth daily   ketorolac (TORADOL) 10 mg tablet   No No   Sig: Take 1 tablet (10 mg total) by mouth every 6 (six) hours as needed for moderate pain   loratadine (CLARITIN) 10 mg tablet   No No   Sig: Take 1 tablet (10 mg total) by mouth daily      Facility-Administered Medications: None       Past Medical History:   Diagnosis Date    Asthma     Seasonal allergies        Past Surgical History:   Procedure Laterality Date     SECTION      five     SECTION      CHOLECYSTECTOMY      GASTRECTOMY SLEEVE LAPAROSCOPIC  10/12/2021    TONSILLECTOMY         History reviewed  No pertinent family history  I have reviewed and agree with the history as documented  E-Cigarette/Vaping    E-Cigarette Use Never User      E-Cigarette/Vaping Substances     Social History     Tobacco Use    Smoking status: Current Every Day Smoker     Packs/day: 0 25     Types: Cigarettes    Smokeless tobacco: Never Used   Vaping Use    Vaping Use: Never used   Substance Use Topics    Alcohol use: Yes     Comment: occassionally    Drug use: Yes     Types: Marijuana     Comment: medical        Review of Systems   Constitutional: Negative  Respiratory: Negative  Cardiovascular: Negative  Gastrointestinal: Negative  Endocrine: Negative  Genitourinary: Negative  Musculoskeletal:        Left dorsal foot swelling and pain   Skin: Negative  Neurological: Negative  Psychiatric/Behavioral: Negative  Physical Exam  ED Triage Vitals [22 1123]   Temperature Pulse Respirations Blood Pressure SpO2   98 3 °F (36 8 °C) 78 18 139/84 98 %      Temp Source Heart Rate Source Patient Position - Orthostatic VS BP Location FiO2 (%)   Oral Monitor Sitting Right arm --      Pain Score       8             Orthostatic Vital Signs  Vitals:    22 1123   BP: 139/84   Pulse: 78   Patient Position - Orthostatic VS: Sitting       Physical Exam  Constitutional:       Appearance: Normal appearance  Cardiovascular:      Rate and Rhythm: Normal rate and regular rhythm  Pulses: Normal pulses  Dorsalis pedis pulses are 2+ on the right side and 2+ on the left side  Posterior tibial pulses are 2+ on the right side and 2+ on the left side  Heart sounds: Normal heart sounds  Pulmonary:      Effort: Pulmonary effort is normal       Breath sounds: Normal breath sounds  Abdominal:      Palpations: Abdomen is soft  Tenderness: There is no abdominal tenderness  Musculoskeletal:      Right foot: Decreased range of motion  Left foot: Decreased range of motion  Comments: Pain on palpation of the entire dorsum of the right foot  Swelling noted to the dorsal aspect of the right foot  MMT 5/5 in all compartments of the lower extremity  Feet:      Right foot:      Protective Sensation: 10 sites tested  10 sites sensed  Skin integrity: Erythema and warmth present  Left foot:      Protective Sensation: 10 sites tested  10 sites sensed  Skin integrity: Skin integrity normal    Skin:     Capillary Refill: Capillary refill takes 2 to 3 seconds  Neurological:      General: No focal deficit present  Mental Status: She is alert and oriented to person, place, and time  Psychiatric:         Mood and Affect: Mood normal          Thought Content: Thought content normal          ED Medications  Medications - No data to display    Diagnostic Studies  Results Reviewed     None                 XR foot 3+ views RIGHT    (Results Pending)         Procedures  Procedures      ED Course                             SBIRT 20yo+    Flowsheet Row Most Recent Value   SBIRT (25 yo +)    In order to provide better care to our patients, we are screening all of our patients for alcohol and drug use  Would it be okay to ask you these screening questions? Unable to answer at this time Filed at: 05/19/2022 1138                MDM  Number of Diagnoses or Management Options  Foot pain, right: established and worsening  Foot swelling: established and worsening  Inflammatory arthritis  Diagnosis management comments: Patient presented with right foot swelling and pain  New XR of right foot were obtained showing no evidence of occult Fx, stress Fx, or bony abnormality  Swelling likely due to inflammation and/or potential aggravation of previous injury   Mild erythema noted of the area cannot r/o inflammatory arthritis as cause of pain  Short course of Prednisone was Rx'd  Patient was instructed on RICE protocol and was given appropriate information to follow up with a podiatrist is symptoms worsen with which she was amenable  Amount and/or Complexity of Data Reviewed  Tests in the radiology section of CPT®: ordered and reviewed        Disposition  Final diagnoses: Foot pain, right   Foot swelling   Inflammatory arthritis     Time reflects when diagnosis was documented in both MDM as applicable and the Disposition within this note     Time User Action Codes Description Comment    5/19/2022 12:40 PM Arther Moulds Add [M97 632] Foot pain, right     5/19/2022 12:41 PM Arther Moulds Add [M79 89] Foot swelling     5/19/2022 12:43 PM Arther Motaniyas Add [M19 90] Inflammatory arthritis       ED Disposition     ED Disposition   Discharge    Condition   Stable    Date/Time   Thu May 19, 2022 [address] discharge to home/self care  Follow-up Information     Follow up With Specialties Details Why Contact Info    Manuelito Larose DPM Podiatry, Wound Care In 1 week As needed, If symptoms worsen [address]  [address]  [phone]            Patient's Medications   Discharge Prescriptions    PREDNISONE 20 MG TABLET    Take 1 tablet (20 mg total) by mouth in the morning for 5 days  Start Date: 5/19/2022 End Date: 5/24/2022       Order Dose: 20 mg       Quantity: 5 tablet    Refills: 0     No discharge procedures on file  PDMP Review     None           ED Provider  Attending physically available and evaluated Burnette Kidney  I managed the patient along with the ED Attending      Electronically Signed by         Maine Fortune DPM  05/19/22 7358

## 2022-05-19 NOTE — DISCHARGE INSTRUCTIONS
Discharge Instructions - Podiatry    Weight Bearing Status: Weight bearing as tolerated use stiff soled surgical shoe to walk in if possible  Limit weight bearing as much as possible  Pain: Rest, ice, compress, and elevate right leg as much as possible  Take antiinflammatories as prescribed  Follow-up appointment instructions: Please make an appointment within one week of discharge with Dr Brenda Weiss if any increase in pain, or symptoms worsen

## 2022-05-19 NOTE — Clinical Note
Nakul Hodgescatrinarichard was seen and treated in our emergency department on 5/19/2022  Diagnosis:     Karen Shore  may return to work on return date  She may return on this date: If you have any questions or concerns, please don't hesitate to call        Ping Suazo DPM    ______________________________           _______________          _______________  Hospital Representative                              Date                                Time

## 2022-05-19 NOTE — ED ATTENDING ATTESTATION
5/19/2022  I, Michael Dove MD, saw and evaluated the patient  I have discussed the patient with the resident/non-physician practitioner and agree with the resident's/non-physician practitioner's findings, Plan of Care, and MDM as documented in the resident's/non-physician practitioner's note, except where noted  All available labs and Radiology studies were reviewed  I was present for key portions of any procedure(s) performed by the resident/non-physician practitioner and I was immediately available to provide assistance  At this point I agree with the current assessment done in the Emergency Department  I have conducted an independent evaluation of this patient a history and physical is as follows:  43 yo F c/o pain on her right foot radiating into lower leg 4 days, and recurrent since dropping a wine bottle on it several weeks ago  She has been seen for this foot previously  Repeat xray today  VS are normal   Foot is is mildly swollen in the midfoot and ankle, some redness, and warmth  Seems to be more tender to light touch  But her pain is diffuse and out of proportion to exam   It does not appear infected, and she had ddimer done 2 days ago, I am not supsecting DVT  An inflammatory arthritis such as gout is possible  We are giving her surgical shoe to help offload, and course of steroids with outpatient followup        ED Course         Critical Care Time  Procedures

## 2022-05-19 NOTE — Clinical Note
Claude Blend accompanied Clayton Toledo to the emergency department on 5/19/2022  Return date if applicable: 16/09/5799    ? If you have any questions or concerns, please don't hesitate to call        Kathi Townsend DPM

## 2022-07-04 ENCOUNTER — HOSPITAL ENCOUNTER (EMERGENCY)
Facility: HOSPITAL | Age: 37
Discharge: HOME | End: 2022-07-04
Attending: EMERGENCY MEDICINE | Admitting: EMERGENCY MEDICINE
Payer: MEDICARE

## 2022-07-04 VITALS
RESPIRATION RATE: 20 BRPM | BODY MASS INDEX: 54.43 KG/M2 | HEART RATE: 90 BPM | DIASTOLIC BLOOD PRESSURE: 87 MMHG | SYSTOLIC BLOOD PRESSURE: 143 MMHG | TEMPERATURE: 98.1 F | OXYGEN SATURATION: 98 % | WEIGHT: 278.7 LBS

## 2022-07-04 DIAGNOSIS — K04.7 DENTAL INFECTION: Primary | ICD-10-CM

## 2022-07-04 PROCEDURE — 99284 EMERGENCY DEPT VISIT MOD MDM: CPT | Performed by: PHYSICIAN ASSISTANT

## 2022-07-04 PROCEDURE — 96372 THER/PROPH/DIAG INJ SC/IM: CPT

## 2022-07-04 PROCEDURE — 99282 EMERGENCY DEPT VISIT SF MDM: CPT

## 2022-07-04 RX ORDER — KETOROLAC TROMETHAMINE 30 MG/ML
15 INJECTION, SOLUTION INTRAMUSCULAR; INTRAVENOUS ONCE
Status: COMPLETED | OUTPATIENT
Start: 2022-07-04 | End: 2022-07-04

## 2022-07-04 RX ORDER — ACETAMINOPHEN 500 MG
500 TABLET ORAL EVERY 6 HOURS PRN
Qty: 30 TABLET | Refills: 0 | Status: SHIPPED | OUTPATIENT
Start: 2022-07-04

## 2022-07-04 RX ORDER — AMOXICILLIN 500 MG/1
500 CAPSULE ORAL EVERY 12 HOURS SCHEDULED
Qty: 20 CAPSULE | Refills: 0 | Status: SHIPPED | OUTPATIENT
Start: 2022-07-04 | End: 2022-07-14

## 2022-07-04 RX ORDER — AMOXICILLIN 500 MG/1
500 CAPSULE ORAL ONCE
Status: COMPLETED | OUTPATIENT
Start: 2022-07-04 | End: 2022-07-04

## 2022-07-04 RX ADMIN — KETOROLAC TROMETHAMINE 15 MG: 30 INJECTION, SOLUTION INTRAMUSCULAR; INTRAVENOUS at 15:44

## 2022-07-04 RX ADMIN — AMOXICILLIN 500 MG: 500 CAPSULE ORAL at 15:44

## 2022-07-05 ENCOUNTER — APPOINTMENT (EMERGENCY)
Dept: CT IMAGING | Facility: HOSPITAL | Age: 37
End: 2022-07-05
Payer: MEDICARE

## 2022-07-05 ENCOUNTER — HOSPITAL ENCOUNTER (EMERGENCY)
Facility: HOSPITAL | Age: 37
Discharge: HOME | End: 2022-07-05
Attending: EMERGENCY MEDICINE
Payer: MEDICARE

## 2022-07-05 VITALS
SYSTOLIC BLOOD PRESSURE: 130 MMHG | TEMPERATURE: 97.1 F | RESPIRATION RATE: 18 BRPM | OXYGEN SATURATION: 100 % | DIASTOLIC BLOOD PRESSURE: 76 MMHG | BODY MASS INDEX: 53.84 KG/M2 | HEART RATE: 68 BPM | WEIGHT: 275.7 LBS

## 2022-07-05 DIAGNOSIS — K04.7 PERIAPICAL ABSCESS: Primary | ICD-10-CM

## 2022-07-05 LAB
ALBUMIN SERPL BCP-MCNC: 3.9 G/DL (ref 3–5.2)
ALP SERPL-CCNC: 132 U/L (ref 43–122)
ALT SERPL W P-5'-P-CCNC: 30 U/L
ANION GAP SERPL CALCULATED.3IONS-SCNC: 4 MMOL/L (ref 5–14)
AST SERPL W P-5'-P-CCNC: 34 U/L (ref 14–36)
BASOPHILS # BLD AUTO: 0.03 THOUSANDS/ΜL (ref 0–0.1)
BASOPHILS NFR BLD AUTO: 0 % (ref 0–1)
BILIRUB SERPL-MCNC: 0.51 MG/DL
BUN SERPL-MCNC: 12 MG/DL (ref 5–25)
CALCIUM SERPL-MCNC: 8.9 MG/DL (ref 8.4–10.2)
CHLORIDE SERPL-SCNC: 109 MMOL/L (ref 97–108)
CO2 SERPL-SCNC: 26 MMOL/L (ref 22–30)
CREAT SERPL-MCNC: 0.45 MG/DL (ref 0.6–1.2)
EOSINOPHIL # BLD AUTO: 0.1 THOUSAND/ΜL (ref 0–0.61)
EOSINOPHIL NFR BLD AUTO: 1 % (ref 0–6)
ERYTHROCYTE [DISTWIDTH] IN BLOOD BY AUTOMATED COUNT: 14.4 % (ref 11.6–15.1)
EXT PREG TEST URINE: NEGATIVE
EXT. CONTROL ED NAV: NORMAL
GFR SERPL CREATININE-BSD FRML MDRD: 126 ML/MIN/1.73SQ M
GLUCOSE SERPL-MCNC: 97 MG/DL (ref 70–99)
HCT VFR BLD AUTO: 42.6 % (ref 34.8–46.1)
HGB BLD-MCNC: 13.8 G/DL (ref 11.5–15.4)
IMM GRANULOCYTES # BLD AUTO: 0.02 THOUSAND/UL (ref 0–0.2)
IMM GRANULOCYTES NFR BLD AUTO: 0 % (ref 0–2)
LYMPHOCYTES # BLD AUTO: 2.82 THOUSANDS/ΜL (ref 0.6–4.47)
LYMPHOCYTES NFR BLD AUTO: 29 % (ref 14–44)
MCH RBC QN AUTO: 29.4 PG (ref 26.8–34.3)
MCHC RBC AUTO-ENTMCNC: 32.4 G/DL (ref 31.4–37.4)
MCV RBC AUTO: 91 FL (ref 82–98)
MONOCYTES # BLD AUTO: 0.65 THOUSAND/ΜL (ref 0.17–1.22)
MONOCYTES NFR BLD AUTO: 7 % (ref 4–12)
NEUTROPHILS # BLD AUTO: 6.21 THOUSANDS/ΜL (ref 1.85–7.62)
NEUTS SEG NFR BLD AUTO: 63 % (ref 43–75)
NRBC BLD AUTO-RTO: 0 /100 WBCS
PLATELET # BLD AUTO: 277 THOUSANDS/UL (ref 149–390)
PMV BLD AUTO: 12.2 FL (ref 8.9–12.7)
POTASSIUM SERPL-SCNC: 3.8 MMOL/L (ref 3.6–5)
PROT SERPL-MCNC: 6.9 G/DL (ref 5.9–8.4)
RBC # BLD AUTO: 4.7 MILLION/UL (ref 3.81–5.12)
SODIUM SERPL-SCNC: 139 MMOL/L (ref 137–147)
WBC # BLD AUTO: 9.83 THOUSAND/UL (ref 4.31–10.16)

## 2022-07-05 PROCEDURE — 99284 EMERGENCY DEPT VISIT MOD MDM: CPT

## 2022-07-05 PROCEDURE — G1004 CDSM NDSC: HCPCS

## 2022-07-05 PROCEDURE — 96361 HYDRATE IV INFUSION ADD-ON: CPT

## 2022-07-05 PROCEDURE — 80053 COMPREHEN METABOLIC PANEL: CPT | Performed by: PHYSICIAN ASSISTANT

## 2022-07-05 PROCEDURE — 85025 COMPLETE CBC W/AUTO DIFF WBC: CPT | Performed by: PHYSICIAN ASSISTANT

## 2022-07-05 PROCEDURE — 99284 EMERGENCY DEPT VISIT MOD MDM: CPT | Performed by: EMERGENCY MEDICINE

## 2022-07-05 PROCEDURE — 87040 BLOOD CULTURE FOR BACTERIA: CPT | Performed by: PHYSICIAN ASSISTANT

## 2022-07-05 PROCEDURE — 70491 CT SOFT TISSUE NECK W/DYE: CPT

## 2022-07-05 PROCEDURE — 96365 THER/PROPH/DIAG IV INF INIT: CPT

## 2022-07-05 PROCEDURE — 36415 COLL VENOUS BLD VENIPUNCTURE: CPT | Performed by: PHYSICIAN ASSISTANT

## 2022-07-05 PROCEDURE — 96375 TX/PRO/DX INJ NEW DRUG ADDON: CPT

## 2022-07-05 PROCEDURE — 81025 URINE PREGNANCY TEST: CPT | Performed by: PHYSICIAN ASSISTANT

## 2022-07-05 RX ORDER — HYDROMORPHONE HCL/PF 1 MG/ML
0.2 SYRINGE (ML) INJECTION ONCE
Status: COMPLETED | OUTPATIENT
Start: 2022-07-05 | End: 2022-07-05

## 2022-07-05 RX ORDER — SODIUM CHLORIDE 9 MG/ML
250 INJECTION, SOLUTION INTRAVENOUS CONTINUOUS
Status: DISCONTINUED | OUTPATIENT
Start: 2022-07-05 | End: 2022-07-05 | Stop reason: HOSPADM

## 2022-07-05 RX ORDER — KETOROLAC TROMETHAMINE 30 MG/ML
30 INJECTION, SOLUTION INTRAMUSCULAR; INTRAVENOUS ONCE
Status: COMPLETED | OUTPATIENT
Start: 2022-07-05 | End: 2022-07-05

## 2022-07-05 RX ORDER — CLINDAMYCIN PHOSPHATE 600 MG/50ML
600 INJECTION INTRAVENOUS ONCE
Status: COMPLETED | OUTPATIENT
Start: 2022-07-05 | End: 2022-07-05

## 2022-07-05 RX ORDER — AMOXICILLIN AND CLAVULANATE POTASSIUM 875; 125 MG/1; MG/1
1 TABLET, FILM COATED ORAL EVERY 12 HOURS SCHEDULED
Qty: 20 TABLET | Refills: 0 | Status: SHIPPED | OUTPATIENT
Start: 2022-07-05 | End: 2022-07-15

## 2022-07-05 RX ORDER — HYDROCODONE BITARTRATE AND ACETAMINOPHEN 5; 325 MG/1; MG/1
1 TABLET ORAL EVERY 6 HOURS PRN
Qty: 12 TABLET | Refills: 0 | Status: SHIPPED | OUTPATIENT
Start: 2022-07-05 | End: 2022-07-08

## 2022-07-05 RX ADMIN — KETOROLAC TROMETHAMINE 30 MG: 30 INJECTION, SOLUTION INTRAMUSCULAR at 11:30

## 2022-07-05 RX ADMIN — SODIUM CHLORIDE 250 ML/HR: 0.9 INJECTION, SOLUTION INTRAVENOUS at 11:24

## 2022-07-05 RX ADMIN — IOHEXOL 65 ML: 350 INJECTION, SOLUTION INTRAVENOUS at 12:28

## 2022-07-05 RX ADMIN — HYDROMORPHONE HYDROCHLORIDE 0.2 MG: 1 INJECTION, SOLUTION INTRAMUSCULAR; INTRAVENOUS; SUBCUTANEOUS at 11:52

## 2022-07-05 RX ADMIN — CLINDAMYCIN IN 5 PERCENT DEXTROSE 600 MG: 12 INJECTION, SOLUTION INTRAVENOUS at 11:37

## 2022-07-05 NOTE — ED NOTES
Pt in room screaming and cursing due to pain medication not relieving her of her pain  Pain medication given 4 minutes ago  Pt heard calling RN a "dumb bitch" because RN asked her what her pain level was prior to administering pain medication  It was explained to pt that she needs to give medication time to work and if it does not work we will give her something else to help with pain  Pt continued to argue this point with this RN       Rony Alonso, BONITA  07/05/22 SYLVIA Burgos, BONITA  07/05/22 0513

## 2022-07-05 NOTE — ED NOTES
Pt refused the toradol stating "that's the same stuff they gave me yesterday and it didn't work"  This RN informed the provider and they are going to speak to the pt       Rebecca Meza RN  07/05/22 1181

## 2022-07-05 NOTE — ED NOTES
Provider spoke with pt about medication and pt agreed to have toradol       Marine Damon, BONITA  07/05/22 6694

## 2022-07-05 NOTE — ED PROVIDER NOTES
History  Chief Complaint   Patient presents with    Dental Pain     Whole bottom jaw hurts starting this morning, pain moves down to neck  Took tylenol around 0600     Pt returns with right increased right lower dental pain , pt with increased pain and swelling to jaw and pain and swelling into right neck       Dental Pain  Location:  Lower  Lower teeth location:  27/RL cuspid and 28/RL 1st bicuspid  Quality:  Sharp and throbbing  Severity:  Severe  Onset quality:  Gradual  Duration:  2 days  Timing:  Constant  Progression:  Worsening  Chronicity:  New  Context: poor dentition    Relieved by:  Nothing  Worsened by:  Nothing  Ineffective treatments:  None tried  Associated symptoms: no congestion    Risk factors: no alcohol problem        Prior to Admission Medications   Prescriptions Last Dose Informant Patient Reported?  Taking?   acetaminophen (TYLENOL) 500 mg tablet Unknown at Unknown time  No No   Sig: Take 1 tablet (500 mg total) by mouth every 6 (six) hours as needed for moderate pain   acetaminophen (TYLENOL) 500 mg tablet Unknown at Unknown time  No No   Sig: Take 1 tablet (500 mg total) by mouth every 6 (six) hours as needed (pain)   albuterol (2 5 mg/3 mL) 0 083 % nebulizer solution Unknown at Unknown time  Yes No   Sig: Inhale 2 5 mg every 6 (six) hours as needed   albuterol (PROVENTIL HFA,VENTOLIN HFA) 90 mcg/act inhaler Unknown at Unknown time  No No   Sig: Inhale 2 puffs every 4 (four) hours as needed for wheezing   amoxicillin (AMOXIL) 500 mg capsule Unknown at Unknown time  No No   Sig: Take 1 capsule (500 mg total) by mouth every 12 (twelve) hours for 10 days   benzocaine (ORAJEL) 10 % mucosal gel Unknown at Unknown time  No No   Sig: Apply 1 application to the mouth or throat as needed for mucositis   calcium carbonate (OS-KARINA) 600 MG tablet Unknown at Unknown time Self Yes No   Sig: Take 600 mg by mouth daily   ketorolac (TORADOL) 10 mg tablet Unknown at Unknown time  No No   Sig: Take 1 tablet (10 mg total) by mouth every 6 (six) hours as needed for moderate pain   loratadine (CLARITIN) 10 mg tablet Unknown at Unknown time  No No   Sig: Take 1 tablet (10 mg total) by mouth daily      Facility-Administered Medications: None       Past Medical History:   Diagnosis Date    Asthma     Seasonal allergies        Past Surgical History:   Procedure Laterality Date     SECTION      five     SECTION      CHOLECYSTECTOMY      GASTRECTOMY SLEEVE LAPAROSCOPIC  10/12/2021    TONSILLECTOMY         History reviewed  No pertinent family history  I have reviewed and agree with the history as documented  E-Cigarette/Vaping    E-Cigarette Use Never User      E-Cigarette/Vaping Substances     Social History     Tobacco Use    Smoking status: Current Every Day Smoker     Packs/day: 0 25     Types: Cigarettes    Smokeless tobacco: Never Used   Vaping Use    Vaping Use: Never used   Substance Use Topics    Alcohol use: Yes     Comment: occassionally    Drug use: Yes     Types: Marijuana     Comment: medical       Review of Systems   Constitutional: Negative  HENT: Positive for dental problem  Negative for congestion  Eyes: Negative  Respiratory: Negative  Cardiovascular: Negative  Gastrointestinal: Negative  Endocrine: Negative  Genitourinary: Negative  Musculoskeletal: Negative  Skin: Negative  Allergic/Immunologic: Negative  Neurological: Negative  Hematological: Negative  Psychiatric/Behavioral: Negative  All other systems reviewed and are negative  Physical Exam  Physical Exam  Vitals and nursing note reviewed  Constitutional:       Appearance: Normal appearance  She is normal weight  Comments: Ct iv contrast approval with Dr Carmita Montemayor     Pt crying in exam room rocking in bed in pain     130pm pt declines admission or observation  Would like pain meds and go home and will recheck with dentist    HENT:      Head: Normocephalic and atraumatic  Right Ear: Tympanic membrane, ear canal and external ear normal       Left Ear: Tympanic membrane, ear canal and external ear normal       Nose: Nose normal       Mouth/Throat:      Mouth: Mucous membranes are moist       Comments: Right lower teeth 27 28 with pain and gum pain right jaw swelling right neck swelling   Eyes:      Extraocular Movements: Extraocular movements intact  Conjunctiva/sclera: Conjunctivae normal       Pupils: Pupils are equal, round, and reactive to light  Cardiovascular:      Rate and Rhythm: Normal rate and regular rhythm  Pulses: Normal pulses  Heart sounds: Normal heart sounds  Pulmonary:      Effort: Pulmonary effort is normal       Breath sounds: Normal breath sounds  Abdominal:      General: Abdomen is flat  Bowel sounds are normal       Palpations: Abdomen is soft  Musculoskeletal:         General: Normal range of motion  Cervical back: Normal range of motion and neck supple  Skin:     General: Skin is warm  Capillary Refill: Capillary refill takes less than 2 seconds  Neurological:      General: No focal deficit present  Mental Status: She is alert and oriented to person, place, and time     Psychiatric:         Mood and Affect: Mood normal          Behavior: Behavior normal          Vital Signs  ED Triage Vitals   Temperature Pulse Respirations Blood Pressure SpO2   07/05/22 1049 07/05/22 1049 07/05/22 1049 07/05/22 1049 07/05/22 1049   (!) 97 1 °F (36 2 °C) 55 18 116/88 98 %      Temp Source Heart Rate Source Patient Position - Orthostatic VS BP Location FiO2 (%)   07/05/22 1049 07/05/22 1049 07/05/22 1049 07/05/22 1049 --   Tympanic Monitor Sitting Left arm       Pain Score       07/05/22 1130       10 - Worst Possible Pain           Vitals:    07/05/22 1049 07/05/22 1402   BP: 116/88 130/76   Pulse: 55 68   Patient Position - Orthostatic VS: Sitting Sitting         Visual Acuity      ED Medications  Medications   sodium chloride 0 9 % infusion (0 mL/hr Intravenous Stopped 7/5/22 1400)   clindamycin (CLEOCIN) IVPB (premix in dextrose) 600 mg 50 mL (0 mg Intravenous Stopped 7/5/22 1240)   ketorolac (TORADOL) injection 30 mg (30 mg Intravenous Given 7/5/22 1130)   HYDROmorphone (DILAUDID) injection 0 2 mg (0 2 mg Intravenous Given 7/5/22 1152)   iohexol (OMNIPAQUE) 350 MG/ML injection (MULTI-DOSE) 65 mL (65 mL Intravenous Given 7/5/22 1228)       Diagnostic Studies  Results Reviewed     Procedure Component Value Units Date/Time    Comprehensive metabolic panel [[id number]]  (Abnormal) Collected: 07/05/22 1117    Lab Status: Final result Specimen: Blood from Line, Venous Updated: 07/05/22 1206     Sodium 139 mmol/L      Potassium 3 8 mmol/L      Chloride 109 mmol/L      CO2 26 mmol/L      ANION GAP 4 mmol/L      BUN 12 mg/dL      Creatinine 0 45 mg/dL      Glucose 97 mg/dL      Calcium 8 9 mg/dL      AST 34 U/L      ALT 30 U/L      Alkaline Phosphatase 132 U/L      Total Protein 6 9 g/dL      Albumin 3 9 g/dL      Total Bilirubin 0 51 mg/dL      eGFR 126 ml/min/1 73sq m     Narrative:      [place] guidelines for Chronic Kidney Disease (CKD):     Stage 1 with normal or high GFR (GFR > 90 mL/min/1 73 square meters)    Stage 2 Mild CKD (GFR = 60-89 mL/min/1 73 square meters)    Stage 3A Moderate CKD (GFR = 45-59 mL/min/1 73 square meters)    Stage 3B Moderate CKD (GFR = 30-44 mL/min/1 73 square meters)    Stage 4 Severe CKD (GFR = 15-29 mL/min/1 73 square meters)    Stage 5 End Stage CKD (GFR <15 mL/min/1 73 square meters)  Note: GFR calculation is accurate only with a steady state creatinine    CBC and differential [[id number]] Collected: 07/05/22 1117    Lab Status: Final result Specimen: Blood from Line, Venous Updated: 07/05/22 1128     WBC 9 83 Thousand/uL      RBC 4 70 Million/uL      Hemoglobin 13 8 g/dL      Hematocrit 42 6 %      MCV 91 fL      MCH 29 4 pg      MCHC 32 4 g/dL      RDW 14 4 %      MPV 12 2 fL Platelets 543 Thousands/uL      nRBC 0 /100 WBCs      Neutrophils Relative 63 %      Immat GRANS % 0 %      Lymphocytes Relative 29 %      Monocytes Relative 7 %      Eosinophils Relative 1 %      Basophils Relative 0 %      Neutrophils Absolute 6 21 Thousands/µL      Immature Grans Absolute 0 02 Thousand/uL      Lymphocytes Absolute 2 82 Thousands/µL      Monocytes Absolute 0 65 Thousand/µL      Eosinophils Absolute 0 10 Thousand/µL      Basophils Absolute 0 03 Thousands/µL     Blood culture [[id number]] Collected: 07/05/22 1117    Lab Status: In process Specimen: Blood from Line, Venous Updated: 07/05/22 1128    Blood culture [[id number]] Collected: 07/05/22 1121    Lab Status: In process Specimen: Blood from Arm, Left Updated: 07/05/22 1127    POCT pregnancy, urine [[id number]]  (Normal) Resulted: 07/05/22 1116    Lab Status: Final result Updated: 07/05/22 1116     EXT PREG TEST UR (Ref: Negative) negative     Control valid                 CT soft tissue neck with contrast   Final Result by Aliyah Hdez MD (07/05 1307)      Findings suggestive of acute cellulitis in right perimandibular region  Mandibular right 2nd premolar carious tooth, possible odontogenic source of infection  No abscess identified  Recommend dental consultation for further evaluation  The study was marked in [facility] for immediate notification  Workstation performed: [id number]                    Procedures  Procedures         ED Course                               SBIRT 22yo+    Flowsheet Row Most Recent Value   SBIRT (23 yo +)    In order to provide better care to our patients, we are screening all of our patients for alcohol and drug use  Would it be okay to ask you these screening questions?  No Filed at: 07/05/2022 1103                    MDM    Disposition  Final diagnoses:   Periapical abscess     Time reflects when diagnosis was documented in both MDM as applicable and the Disposition within this note Time User Action Codes Description Comment    7/5/2022  1:36 PM Tona Chan Add [K04 7] Periapical abscess       ED Disposition     ED Disposition   Discharge    Condition   Stable    Date/Time   Tue Jul 5, 2022  1:45 PM    [address] [place] [address] discharge to home/self care                 Follow-up Information     Follow up With Specialties Details Why [address]    [address]          Discharge Medication List as of 7/5/2022  1:46 PM      START taking these medications    Details   amoxicillin-clavulanate (AUGMENTIN) 875-125 mg per tablet Take 1 tablet by mouth every 12 (twelve) hours for 10 days, Starting Tue 7/5/2022, Until Fri 7/15/2022, Print      HYDROcodone-acetaminophen (Norco) 5-325 mg per tablet Take 1 tablet by mouth every 6 (six) hours as needed for pain for up to 3 days Avoid acetaminophen and acetaminophen-containing products Max Daily Amount: 4 tablets, Starting Tue 7/5/2022, Until Fri 7/8/2022 at 2359, Normal         CONTINUE these medications which have NOT CHANGED    Details   !! acetaminophen (TYLENOL) 500 mg tablet Take 1 tablet (500 mg total) by mouth every 6 (six) hours as needed for moderate pain, Starting Mon 5/16/2022, Normal      !! acetaminophen (TYLENOL) 500 mg tablet Take 1 tablet (500 mg total) by mouth every 6 (six) hours as needed (pain), Starting Mon 7/4/2022, Normal      albuterol (2 5 mg/3 mL) 0 083 % nebulizer solution Inhale 2 5 mg every 6 (six) hours as needed, Starting Thu 12/6/2018, Historical Med      albuterol (PROVENTIL HFA,VENTOLIN HFA) 90 mcg/act inhaler Inhale 2 puffs every 4 (four) hours as needed for wheezing, Starting Sun 3/1/2020, Print      amoxicillin (AMOXIL) 500 mg capsule Take 1 capsule (500 mg total) by mouth every 12 (twelve) hours for 10 days, Starting Mon 7/4/2022, Until Thu 7/14/2022, Normal      benzocaine (ORAJEL) 10 % mucosal gel Apply 1 application to the mouth or throat as needed for mucositis, Starting Mon 7/4/2022, Normal      calcium carbonate (OS-KARINA) 600 MG tablet Take 600 mg by mouth daily, Historical Med      ketorolac (TORADOL) 10 mg tablet Take 1 tablet (10 mg total) by mouth every 6 (six) hours as needed for moderate pain, Starting Wed 3/9/2022, Normal      loratadine (CLARITIN) 10 mg tablet Take 1 tablet (10 mg total) by mouth daily, Starting Tue 2/18/2020, Normal       !! - Potential duplicate medications found  Please discuss with provider  No discharge procedures on file      PDMP Review       Value Time User    PDMP Reviewed  Yes 7/5/2022  1:37 PM Kingsley Knox MD          ED Provider  Electronically Signed by           Mariangel Quintana PA-C  07/05/22 0025

## 2022-07-05 NOTE — ED NOTES
Sandwiches, chips, and crackers given to pts visitors   Pt understands she may not have any at this time     Simin Stone RN  07/05/22 0676

## 2022-07-05 NOTE — ED ATTENDING ATTESTATION
7/5/2022  IElier MD, saw and evaluated the patient  I have discussed the patient with the resident/non-physician practitioner and agree with the resident's/non-physician practitioner's findings, Plan of Care, and MDM as documented in the resident's/non-physician practitioner's note, except where noted  All available labs and Radiology studies were reviewed  I was present for key portions of any procedure(s) performed by the resident/non-physician practitioner and I was immediately available to provide assistance  At this point I agree with the current assessment done in the Emergency Department  I have conducted an independent evaluation of this patient a history and physical is as follows:     I was asked to see the patient to provide opioid analgesia prescription for the patient  Patient states her dental pain started several days ago and is located on the right mandibular region  She is supposed to have an extraction in August but has worsening pain and swelling in the submandibular region  CT imaging was noted to have cellulitis but no evidence of abscess  Patient to be initiated on antibiotics  Will transition from amoxicillin to different antibiotic  Patient has a history of gastric bypass and was told to avoid NSAIDs  Patient has been taking acetaminophen at home without significant improvement  Received hydromorphone here with improvement  There is no evidence of airway compromise and patient is resting comfortably my evaluation  Physical exam notable for a broken mandibular molar on the right side  There is submandibular swelling and tenderness but no evidence of crepitus  Discussed with patient and advised admission for observation and continued IV antibiotics to ensure her infection improves and that she has not developed airway compromise or worsening swelling or edema  However, patient states she needs to be discharged home at this time    Discussed risks of leaving including airway compromise, worsening infection, difficulty swallowing, choking, morbidity, or death  Patient is in agreement with these risks and will return with any new or worsening symptoms  Will follow-up with dental   Patient will be provided with a short course of Norco; patient has previously tolerated this medication while pregnant  Counseled on the use of this medication and advised to avoid driving, operating heavy machinery, and working while using this medication  Advised use only as needed for severe pain and recommended to use at night  Counseled against the use of concomitant acetaminophen acetaminophen containing products  PDMP reviewed  Patient is in agreement and understanding of these instructions      Diagnosis:  Periapical abscess, submandibular cellulitis  Disposition:  Discharge

## 2022-07-10 LAB
BACTERIA BLD CULT: NORMAL
BACTERIA BLD CULT: NORMAL

## 2022-08-25 ENCOUNTER — APPOINTMENT (OUTPATIENT)
Dept: RADIOLOGY | Facility: HOSPITAL | Age: 37
End: 2022-08-25
Payer: MEDICARE

## 2022-08-25 ENCOUNTER — HOSPITAL ENCOUNTER (EMERGENCY)
Facility: HOSPITAL | Age: 37
Discharge: HOME | End: 2022-08-25
Attending: EMERGENCY MEDICINE
Payer: MEDICARE

## 2022-08-25 VITALS
RESPIRATION RATE: 20 BRPM | OXYGEN SATURATION: 98 % | DIASTOLIC BLOOD PRESSURE: 60 MMHG | WEIGHT: 273.59 LBS | HEART RATE: 87 BPM | SYSTOLIC BLOOD PRESSURE: 125 MMHG | TEMPERATURE: 98.3 F | BODY MASS INDEX: 53.43 KG/M2

## 2022-08-25 DIAGNOSIS — J45.901 ACUTE ASTHMA EXACERBATION: Primary | ICD-10-CM

## 2022-08-25 LAB
EXT PREG TEST URINE: NEGATIVE
EXT. CONTROL ED NAV: NORMAL

## 2022-08-25 PROCEDURE — 99284 EMERGENCY DEPT VISIT MOD MDM: CPT | Performed by: EMERGENCY MEDICINE

## 2022-08-25 PROCEDURE — 99284 EMERGENCY DEPT VISIT MOD MDM: CPT

## 2022-08-25 PROCEDURE — 71046 X-RAY EXAM CHEST 2 VIEWS: CPT

## 2022-08-25 PROCEDURE — 94640 AIRWAY INHALATION TREATMENT: CPT

## 2022-08-25 PROCEDURE — 81025 URINE PREGNANCY TEST: CPT | Performed by: EMERGENCY MEDICINE

## 2022-08-25 RX ORDER — IPRATROPIUM BROMIDE AND ALBUTEROL SULFATE 2.5; .5 MG/3ML; MG/3ML
3 SOLUTION RESPIRATORY (INHALATION) ONCE
Status: COMPLETED | OUTPATIENT
Start: 2022-08-25 | End: 2022-08-25

## 2022-08-25 RX ORDER — PREDNISONE 50 MG/1
50 TABLET ORAL DAILY
Qty: 4 TABLET | Refills: 0 | Status: SHIPPED | OUTPATIENT
Start: 2022-08-26

## 2022-08-25 RX ORDER — ALBUTEROL SULFATE 90 UG/1
1-2 AEROSOL, METERED RESPIRATORY (INHALATION) EVERY 6 HOURS PRN
Qty: 6.7 G | Refills: 0 | Status: SHIPPED | OUTPATIENT
Start: 2022-08-25

## 2022-08-25 RX ADMIN — IPRATROPIUM BROMIDE AND ALBUTEROL SULFATE 3 ML: 2.5; .5 SOLUTION RESPIRATORY (INHALATION) at 10:18

## 2022-08-25 RX ADMIN — PREDNISONE 50 MG: 20 TABLET ORAL at 10:18

## 2022-08-25 NOTE — ED PROVIDER NOTES
History  Chief Complaint   Patient presents with    Asthma     Tried all meds at home, cough     43 yo female with a history of asthma and gastric bypass surgery presents to the ED complaining of an asthma exacerbation x 1 day  The patient reports shortness of breath with exertion, intermittent wheezing, and a nonproductive cough x 1 day  She has been using her home controller medications without improvement  No chest pain or tightness  (+) Occasional pleuritic pain in her right thoracic back  No fevers/chills  No other URI symptoms  She told a home COVID-19 test --> negative per patient  No history of intubations  No recent steroids  She is a smoker  No other specific complaints  Prior to Admission Medications   Prescriptions Last Dose Informant Patient Reported?  Taking?   acetaminophen (TYLENOL) 500 mg tablet   No No   Sig: Take 1 tablet (500 mg total) by mouth every 6 (six) hours as needed for moderate pain   acetaminophen (TYLENOL) 500 mg tablet   No No   Sig: Take 1 tablet (500 mg total) by mouth every 6 (six) hours as needed (pain)   albuterol (2 5 mg/3 mL) 0 083 % nebulizer solution   Yes No   Sig: Inhale 2 5 mg every 6 (six) hours as needed   albuterol (PROVENTIL HFA,VENTOLIN HFA) 90 mcg/act inhaler   No No   Sig: Inhale 2 puffs every 4 (four) hours as needed for wheezing   benzocaine (ORAJEL) 10 % mucosal gel   No No   Sig: Apply 1 application to the mouth or throat as needed for mucositis   calcium carbonate (OS-KARINA) 600 MG tablet  Self Yes No   Sig: Take 600 mg by mouth daily   ketorolac (TORADOL) 10 mg tablet   No No   Sig: Take 1 tablet (10 mg total) by mouth every 6 (six) hours as needed for moderate pain   loratadine (CLARITIN) 10 mg tablet   No No   Sig: Take 1 tablet (10 mg total) by mouth daily      Facility-Administered Medications: None       Past Medical History:   Diagnosis Date    Asthma     Seasonal allergies        Past Surgical History:   Procedure Laterality Date     SECTION      five     SECTION      CHOLECYSTECTOMY      GASTRECTOMY SLEEVE LAPAROSCOPIC  10/12/2021    TONSILLECTOMY         History reviewed  No pertinent family history  I have reviewed and agree with the history as documented  E-Cigarette/Vaping    E-Cigarette Use Never User      E-Cigarette/Vaping Substances     Social History     Tobacco Use    Smoking status: Current Every Day Smoker     Packs/day: 0 25     Types: Cigarettes    Smokeless tobacco: Never Used   Vaping Use    Vaping Use: Never used   Substance Use Topics    Alcohol use: Yes     Comment: occassionally    Drug use: Yes     Types: Marijuana     Comment: medical       Review of Systems   Constitutional: Negative for chills and fever  HENT: Negative for congestion, rhinorrhea and sore throat  Eyes: Negative for visual disturbance  Respiratory: Positive for cough, shortness of breath and wheezing  Cardiovascular: Negative for chest pain  Gastrointestinal: Negative for abdominal pain, diarrhea, nausea and vomiting  Endocrine: Negative for cold intolerance and heat intolerance  Genitourinary: Negative for dysuria and frequency  Musculoskeletal: Positive for back pain  Negative for neck pain and neck stiffness  Skin: Negative for rash  Allergic/Immunologic: Negative for immunocompromised state  Neurological: Negative for dizziness, weakness, light-headedness and numbness  Hematological: Negative for adenopathy  Psychiatric/Behavioral: Negative for self-injury  Physical Exam  Physical Exam  Constitutional:       General: She is not in acute distress  Appearance: She is well-developed  HENT:      Head: Normocephalic and atraumatic  Eyes:      Pupils: Pupils are equal, round, and reactive to light  Cardiovascular:      Rate and Rhythm: Normal rate and regular rhythm  Pulmonary:      Effort: Pulmonary effort is normal       Breath sounds: Wheezing present   No decreased breath sounds, rhonchi or rales  Abdominal:      General: There is no distension  Palpations: Abdomen is soft  Tenderness: There is no abdominal tenderness  Musculoskeletal:         General: Normal range of motion  Cervical back: Normal range of motion and neck supple  Skin:     General: Skin is warm and dry  Neurological:      Mental Status: She is alert and oriented to person, place, and time  Vital Signs  ED Triage Vitals [08/25/22 0949]   Temperature Pulse Respirations Blood Pressure SpO2   98 3 °F (36 8 °C) 87 20 125/60 98 %      Temp Source Heart Rate Source Patient Position - Orthostatic VS BP Location FiO2 (%)   Oral Monitor Sitting Right arm --      Pain Score       --           Vitals:    08/25/22 0949   BP: 125/60   Pulse: 87   Patient Position - Orthostatic VS: Sitting         Visual Acuity      ED Medications  Medications   ipratropium-albuterol (DUO-NEB) 0 5-2 5 mg/3 mL inhalation solution 3 mL (3 mL Nebulization Given 8/25/22 1018)   predniSONE tablet 50 mg (50 mg Oral Given 8/25/22 1018)       Diagnostic Studies  Results Reviewed     Procedure Component Value Units Date/Time    POCT pregnancy, urine [[id number]]  (Normal) Resulted: 08/25/22 1017    Lab Status: Final result Updated: 08/25/22 1025     EXT PREG TEST UR (Ref: Negative) negative     Control valid                 XR chest 2 views    (Results Pending)              Procedures  Procedures         ED Course                               SBIRT 20yo+    Flowsheet Row Most Recent Value   SBIRT (23 yo +)    In order to provide better care to our patients, we are screening all of our patients for alcohol and drug use  Would it be okay to ask you these screening questions? No Filed at: 08/25/2022 1006                    MDM  Number of Diagnoses or Management Options  Acute asthma exacerbation  Diagnosis management comments:  The patient is comfortable appearing with stable vital signs  (+) Scattered bilateral wheezes on exam but good air movement  Symptoms are reportedly consistent with past asthma exacerbations  Duoneb and oral steroid initiated  CXR ordered  Will continue to monitor in the ED  11:35 All symptoms resolved  CXR unremarkable  Lungs now CTA B/L  The patient says she feels "much better" and is requesting discharge  Will continue steroids for a total of 4 days  Plan for follow up with her PCP early next week for reassessment  The patient is agreeable to this plan  Strict return precautions provided  Amount and/or Complexity of Data Reviewed  Tests in the radiology section of CPT®: ordered and reviewed    Patient Progress  Patient progress: improved      Disposition  Final diagnoses:   Acute asthma exacerbation     Time reflects when diagnosis was documented in both MDM as applicable and the Disposition within this note     Time User Action Codes Description Comment    8/25/2022 11:33 AM Omar Abebe Add [Q83 [id number]] Acute asthma exacerbation       ED Disposition     ED Disposition   Discharge    Condition   Stable    Date/Time   Thu Aug 25, 2022 11:33 AM    Comment   Iván Mosqueda discharge to home/self care  Follow-up Information     Follow up With Specialties Details Why Contact Info    Randolph Wright MD Family Medicine Schedule an appointment as soon as possible for a visit   [address] [place] [id number] [phone]            Patient's Medications   Discharge Prescriptions    ALBUTEROL (PROVENTIL HFA) 90 MCG/ACT INHALER    Inhale 1-2 puffs every 6 (six) hours as needed for wheezing or shortness of breath       Start Date: 8/25/2022 End Date: --       Order Dose: 1-2 puffs       Quantity: 6 7 g    Refills: 0    PREDNISONE 50 MG TABLET    Take 1 tablet (50 mg total) by mouth daily       Start Date: 8/26/2022 End Date: --       Order Dose: 50 mg       Quantity: 4 tablet    Refills: 0       No discharge procedures on file      PDMP Review       Value Time User    PDMP Reviewed  Yes 7/5/2022  1:37 PM Keli Flores MD          ED Provider  Electronically Signed by           Elizabeth Hartman MD  08/25/22 4399

## 2022-08-25 NOTE — Clinical Note
Cristy Mejia was seen and treated in our emergency department on 8/25/2022  Diagnosis:     Sukhjinder Mckeon  may return to work on return date  She may return on this date: 08/26/2022         If you have any questions or concerns, please don't hesitate to call        Tiffany Ordonez MD    ______________________________           _______________          _______________  Hospital Representative                              Date                                Time

## 2022-09-24 ENCOUNTER — APPOINTMENT (OUTPATIENT)
Dept: RADIOLOGY | Facility: HOSPITAL | Age: 37
End: 2022-09-24
Payer: MEDICARE

## 2022-09-24 ENCOUNTER — APPOINTMENT (EMERGENCY)
Dept: CT IMAGING | Facility: HOSPITAL | Age: 37
End: 2022-09-24
Payer: MEDICARE

## 2022-09-24 ENCOUNTER — HOSPITAL ENCOUNTER (EMERGENCY)
Facility: HOSPITAL | Age: 37
Discharge: HOME | End: 2022-09-24
Attending: STUDENT IN AN ORGANIZED HEALTH CARE EDUCATION/TRAINING PROGRAM
Payer: MEDICARE

## 2022-09-24 VITALS
RESPIRATION RATE: 18 BRPM | WEIGHT: 274.25 LBS | SYSTOLIC BLOOD PRESSURE: 90 MMHG | DIASTOLIC BLOOD PRESSURE: 58 MMHG | TEMPERATURE: 96.7 F | BODY MASS INDEX: 53.56 KG/M2 | HEART RATE: 56 BPM | OXYGEN SATURATION: 98 %

## 2022-09-24 DIAGNOSIS — R07.9 CHEST PAIN: ICD-10-CM

## 2022-09-24 DIAGNOSIS — R68.89 FLU-LIKE SYMPTOMS: ICD-10-CM

## 2022-09-24 DIAGNOSIS — Z20.822 LAB TEST NEGATIVE FOR COVID-19 VIRUS: Primary | ICD-10-CM

## 2022-09-24 LAB
ALBUMIN SERPL BCP-MCNC: 4.1 G/DL (ref 3.5–5)
ALP SERPL-CCNC: 96 U/L (ref 43–122)
ALT SERPL W P-5'-P-CCNC: 24 U/L
ANION GAP SERPL CALCULATED.3IONS-SCNC: 4 MMOL/L (ref 5–14)
AST SERPL W P-5'-P-CCNC: 31 U/L (ref 14–36)
ATRIAL RATE: 58 BPM
BASOPHILS # BLD AUTO: 0.02 THOUSANDS/ΜL (ref 0–0.1)
BASOPHILS NFR BLD AUTO: 0 % (ref 0–1)
BILIRUB SERPL-MCNC: 0.72 MG/DL (ref 0.2–1)
BUN SERPL-MCNC: 16 MG/DL (ref 5–25)
CALCIUM SERPL-MCNC: 8.1 MG/DL (ref 8.4–10.2)
CARDIAC TROPONIN I PNL SERPL HS: <2 NG/L
CHLORIDE SERPL-SCNC: 105 MMOL/L (ref 96–108)
CO2 SERPL-SCNC: 27 MMOL/L (ref 21–32)
CREAT SERPL-MCNC: 0.44 MG/DL (ref 0.6–1.2)
D DIMER PPP FEU-MCNC: 0.34 UG/ML FEU
EOSINOPHIL # BLD AUTO: 0.09 THOUSAND/ΜL (ref 0–0.61)
EOSINOPHIL NFR BLD AUTO: 1 % (ref 0–6)
ERYTHROCYTE [DISTWIDTH] IN BLOOD BY AUTOMATED COUNT: 14.6 % (ref 11.6–15.1)
EXT PREG TEST URINE: NORMAL
EXT. CONTROL ED NAV: NORMAL
GFR SERPL CREATININE-BSD FRML MDRD: 127 ML/MIN/1.73SQ M
GLUCOSE SERPL-MCNC: 88 MG/DL (ref 70–99)
HCT VFR BLD AUTO: 44.8 % (ref 34.8–46.1)
HGB BLD-MCNC: 14.6 G/DL (ref 11.5–15.4)
IMM GRANULOCYTES # BLD AUTO: 0.02 THOUSAND/UL (ref 0–0.2)
IMM GRANULOCYTES NFR BLD AUTO: 0 % (ref 0–2)
LIPASE SERPL-CCNC: 54 U/L (ref 23–300)
LYMPHOCYTES # BLD AUTO: 2.74 THOUSANDS/ΜL (ref 0.6–4.47)
LYMPHOCYTES NFR BLD AUTO: 28 % (ref 14–44)
MCH RBC QN AUTO: 30.2 PG (ref 26.8–34.3)
MCHC RBC AUTO-ENTMCNC: 32.6 G/DL (ref 31.4–37.4)
MCV RBC AUTO: 93 FL (ref 82–98)
MONOCYTES # BLD AUTO: 0.51 THOUSAND/ΜL (ref 0.17–1.22)
MONOCYTES NFR BLD AUTO: 5 % (ref 4–12)
NEUTROPHILS # BLD AUTO: 6.37 THOUSANDS/ΜL (ref 1.85–7.62)
NEUTS SEG NFR BLD AUTO: 66 % (ref 43–75)
NRBC BLD AUTO-RTO: 0 /100 WBCS
P AXIS: 21 DEGREES
PLATELET # BLD AUTO: 301 THOUSANDS/UL (ref 149–390)
PMV BLD AUTO: 11.9 FL (ref 8.9–12.7)
POTASSIUM SERPL-SCNC: 5 MMOL/L (ref 3.5–5.3)
PR INTERVAL: 138 MS
PROT SERPL-MCNC: 7.4 G/DL (ref 6.4–8.4)
QRS AXIS: 48 DEGREES
QRSD INTERVAL: 90 MS
QT INTERVAL: 434 MS
QTC INTERVAL: 426 MS
RBC # BLD AUTO: 4.83 MILLION/UL (ref 3.81–5.12)
SARS-COV-2 RNA RESP QL NAA+PROBE: NEGATIVE
SODIUM SERPL-SCNC: 136 MMOL/L (ref 135–147)
T WAVE AXIS: 15 DEGREES
VENTRICULAR RATE: 58 BPM
WBC # BLD AUTO: 9.75 THOUSAND/UL (ref 4.31–10.16)

## 2022-09-24 PROCEDURE — 93005 ELECTROCARDIOGRAM TRACING: CPT

## 2022-09-24 PROCEDURE — G1004 CDSM NDSC: HCPCS

## 2022-09-24 PROCEDURE — 85379 FIBRIN DEGRADATION QUANT: CPT | Performed by: PHYSICIAN ASSISTANT

## 2022-09-24 PROCEDURE — 84484 ASSAY OF TROPONIN QUANT: CPT | Performed by: PHYSICIAN ASSISTANT

## 2022-09-24 PROCEDURE — 80053 COMPREHEN METABOLIC PANEL: CPT | Performed by: PHYSICIAN ASSISTANT

## 2022-09-24 PROCEDURE — U0003 INFECTIOUS AGENT DETECTION BY NUCLEIC ACID (DNA OR RNA); SEVERE ACUTE RESPIRATORY SYNDROME CORONAVIRUS 2 (SARS-COV-2) (CORONAVIRUS DISEASE [COVID-19]), AMPLIFIED PROBE TECHNIQUE, MAKING USE OF HIGH THROUGHPUT TECHNOLOGIES AS DESCRIBED BY CMS-2020-01-R: HCPCS | Performed by: PHYSICIAN ASSISTANT

## 2022-09-24 PROCEDURE — 70450 CT HEAD/BRAIN W/O DYE: CPT

## 2022-09-24 PROCEDURE — 99285 EMERGENCY DEPT VISIT HI MDM: CPT | Performed by: PHYSICIAN ASSISTANT

## 2022-09-24 PROCEDURE — U0005 INFEC AGEN DETEC AMPLI PROBE: HCPCS | Performed by: PHYSICIAN ASSISTANT

## 2022-09-24 PROCEDURE — 85025 COMPLETE CBC W/AUTO DIFF WBC: CPT | Performed by: PHYSICIAN ASSISTANT

## 2022-09-24 PROCEDURE — 96374 THER/PROPH/DIAG INJ IV PUSH: CPT

## 2022-09-24 PROCEDURE — 71045 X-RAY EXAM CHEST 1 VIEW: CPT

## 2022-09-24 PROCEDURE — 81025 URINE PREGNANCY TEST: CPT | Performed by: PHYSICIAN ASSISTANT

## 2022-09-24 PROCEDURE — 93010 ELECTROCARDIOGRAM REPORT: CPT | Performed by: INTERNAL MEDICINE

## 2022-09-24 PROCEDURE — 99285 EMERGENCY DEPT VISIT HI MDM: CPT

## 2022-09-24 PROCEDURE — 96372 THER/PROPH/DIAG INJ SC/IM: CPT

## 2022-09-24 PROCEDURE — 83690 ASSAY OF LIPASE: CPT | Performed by: PHYSICIAN ASSISTANT

## 2022-09-24 PROCEDURE — 36415 COLL VENOUS BLD VENIPUNCTURE: CPT | Performed by: PHYSICIAN ASSISTANT

## 2022-09-24 RX ORDER — ACETAMINOPHEN 500 MG
500 TABLET ORAL EVERY 6 HOURS PRN
Qty: 30 TABLET | Refills: 0 | Status: SHIPPED | OUTPATIENT
Start: 2022-09-24

## 2022-09-24 RX ORDER — KETOROLAC TROMETHAMINE 30 MG/ML
15 INJECTION, SOLUTION INTRAMUSCULAR; INTRAVENOUS ONCE
Status: DISCONTINUED | OUTPATIENT
Start: 2022-09-24 | End: 2022-09-24

## 2022-09-24 RX ORDER — DIPHENHYDRAMINE HYDROCHLORIDE 50 MG/ML
25 INJECTION INTRAMUSCULAR; INTRAVENOUS ONCE
Status: COMPLETED | OUTPATIENT
Start: 2022-09-24 | End: 2022-09-24

## 2022-09-24 RX ORDER — METOCLOPRAMIDE HYDROCHLORIDE 5 MG/ML
10 INJECTION INTRAMUSCULAR; INTRAVENOUS ONCE
Status: DISCONTINUED | OUTPATIENT
Start: 2022-09-24 | End: 2022-09-24

## 2022-09-24 RX ORDER — METOCLOPRAMIDE HYDROCHLORIDE 5 MG/ML
10 INJECTION INTRAMUSCULAR; INTRAVENOUS EVERY 6 HOURS PRN
Status: DISCONTINUED | OUTPATIENT
Start: 2022-09-24 | End: 2022-09-24 | Stop reason: HOSPADM

## 2022-09-24 RX ORDER — METOCLOPRAMIDE 10 MG/1
10 TABLET ORAL EVERY 6 HOURS
Qty: 30 TABLET | Refills: 0 | Status: SHIPPED | OUTPATIENT
Start: 2022-09-24

## 2022-09-24 RX ADMIN — METOCLOPRAMIDE 10 MG: 5 INJECTION, SOLUTION INTRAMUSCULAR; INTRAVENOUS at 09:53

## 2022-09-24 RX ADMIN — DIPHENHYDRAMINE HYDROCHLORIDE 25 MG: 50 INJECTION, SOLUTION INTRAMUSCULAR; INTRAVENOUS at 09:24

## 2022-09-24 NOTE — Clinical Note
Eliel Reymundo was seen and treated in our emergency department on 9/24/2022  Diagnosis:     Fadumo Moser  may return to work on return date  She may return on this date: 09/26/2022         If you have any questions or concerns, please don't hesitate to call        Nael López PA-C    ______________________________           _______________          _______________  Hospital Representative                              Date                                Time

## 2022-09-24 NOTE — ED PROVIDER NOTES
History  Chief Complaint   Patient presents with    Chest Pain     Tight since yesterday and worse this AM      Headache     Since yesterday - took Tylenol     38-year-old female past medical history significant for asthma for which she has never been intubated or admitted to the intensive care unit, migraines, gastric bypass, cholecystectomy in multiple  sections presenting today for evaluation of multiple complaints  Patient reports a headache, different from her typical migraines, fatigue, body aches particularly in her back and legs, chest pain in the anterior chest which is a pressure-like sensation radiating to her back gradual in onset beginning yesterday worse with deep breathing and lying back, better with rest   An episode of abdominal pain which resolved spontaneously yesterday  Patient denies any numbness or tingling, weakness or paresthesias, paralysis, skin changes or exacerbating traumatic events for the pain  Patient states she is vaccinated for COVID-19 however her children are in school and were recently ill         History provided by:  Patient   used: No    Chest Pain  Pain location:  Substernal area  Pain quality: aching and pressure    Pain radiates to:  Mid back  Pain radiates to the back: yes    Pain severity:  Moderate  Onset quality:  Gradual  Duration:  2 hours  Timing:  Constant  Progression:  Unchanged  Chronicity:  New  Relieved by:  None tried  Worsened by:  Nothing tried  Ineffective treatments:  None tried  Associated symptoms: abdominal pain, back pain, fatigue, headache and palpitations    Associated symptoms: no dizziness, no fever, no nausea, no numbness, no shortness of breath and not vomiting    Headache  Associated symptoms: abdominal pain, back pain, fatigue and myalgias    Associated symptoms: no congestion, no dizziness, no ear pain, no fever, no nausea, no numbness, no sore throat and no vomiting        Prior to Admission Medications Prescriptions Last Dose Informant Patient Reported? Taking?   acetaminophen (TYLENOL) 500 mg tablet   No No   Sig: Take 1 tablet (500 mg total) by mouth every 6 (six) hours as needed for moderate pain   acetaminophen (TYLENOL) 500 mg tablet   No No   Sig: Take 1 tablet (500 mg total) by mouth every 6 (six) hours as needed (pain)   albuterol (2 5 mg/3 mL) 0 083 % nebulizer solution   Yes No   Sig: Inhale 2 5 mg every 6 (six) hours as needed   albuterol (PROVENTIL HFA,VENTOLIN HFA) 90 mcg/act inhaler   No No   Sig: Inhale 2 puffs every 4 (four) hours as needed for wheezing   albuterol (Proventil HFA) 90 mcg/act inhaler   No No   Sig: Inhale 1-2 puffs every 6 (six) hours as needed for wheezing or shortness of breath   benzocaine (ORAJEL) 10 % mucosal gel   No No   Sig: Apply 1 application to the mouth or throat as needed for mucositis   calcium carbonate (OS-KARINA) 600 MG tablet  Self Yes No   Sig: Take 600 mg by mouth daily   ketorolac (TORADOL) 10 mg tablet   No No   Sig: Take 1 tablet (10 mg total) by mouth every 6 (six) hours as needed for moderate pain   loratadine (CLARITIN) 10 mg tablet   No No   Sig: Take 1 tablet (10 mg total) by mouth daily   predniSONE 50 mg tablet   No No   Sig: Take 1 tablet (50 mg total) by mouth daily      Facility-Administered Medications: None       Past Medical History:   Diagnosis Date    Asthma     Seasonal allergies        Past Surgical History:   Procedure Laterality Date     SECTION      five     SECTION      CHOLECYSTECTOMY      GASTRECTOMY SLEEVE LAPAROSCOPIC  10/12/2021    TONSILLECTOMY         History reviewed  No pertinent family history  I have reviewed and agree with the history as documented      E-Cigarette/Vaping    E-Cigarette Use Never User      E-Cigarette/Vaping Substances     Social History     Tobacco Use    Smoking status: Current Every Day Smoker     Packs/day: 0 25     Types: Cigarettes    Smokeless tobacco: Never Used   Vaping Use    Vaping Use: Never used   Substance Use Topics    Alcohol use: Yes     Comment: occassionally    Drug use: Yes     Types: Marijuana     Comment: medical       Review of Systems   Constitutional: Positive for chills and fatigue  Negative for fever  HENT: Negative for congestion, ear pain, rhinorrhea and sore throat  Eyes: Negative for redness  Respiratory: Negative for chest tightness and shortness of breath  Cardiovascular: Positive for chest pain and palpitations  Gastrointestinal: Positive for abdominal pain  Negative for nausea and vomiting  Genitourinary: Negative for dysuria and hematuria  Musculoskeletal: Positive for arthralgias, back pain and myalgias  Skin: Negative for rash  Neurological: Positive for headaches  Negative for dizziness, syncope, light-headedness and numbness  Physical Exam  Physical Exam  Vitals and nursing note reviewed  Constitutional:       Appearance: She is well-developed  HENT:      Head: Normocephalic  Eyes:      General: No scleral icterus  Cardiovascular:      Rate and Rhythm: Normal rate and regular rhythm  Pulmonary:      Effort: Pulmonary effort is normal       Breath sounds: Normal breath sounds  No stridor  Comments: Patient in no respiratory distress, speaking in full sentences, managing oral secretions without difficulty, no accessory muscle use, retractions, or belly breathing noted, no adventitious lung sounds auscultated bilaterally  Abdominal:      General: There is no distension  Palpations: Abdomen is soft  Tenderness: There is no abdominal tenderness  Musculoskeletal:         General: Normal range of motion  Skin:     General: Skin is warm and dry  Capillary Refill: Capillary refill takes less than 2 seconds  Neurological:      Mental Status: She is alert and oriented to person, place, and time           Vital Signs  ED Triage Vitals [09/24/22 0756]   Temperature Pulse Respirations Blood Pressure SpO2 Boo Quiet ) 96 7 °F (35 9 °C) 62 20 104/85 100 %      Temp Source Heart Rate Source Patient Position - Orthostatic VS BP Location FiO2 (%)   Tympanic Monitor Lying Left arm --      Pain Score       --           Vitals:    09/24/22 0756 09/24/22 1138   BP: 104/85 90/58   Pulse: 62 56   Patient Position - Orthostatic VS: Lying Lying         Visual Acuity      ED Medications  Medications   sodium chloride 0 9 % bolus 1,000 mL (1,000 mL Intravenous Not Given 9/24/22 1002)   metoclopramide (REGLAN) injection 10 mg (10 mg Intramuscular Given 9/24/22 0953)   diphenhydrAMINE (BENADRYL) injection 25 mg (25 mg Intravenous Given 9/24/22 0924)       Diagnostic Studies  Results Reviewed     Procedure Component Value Units Date/Time    HS Troponin I 4hr [[id number]]     Lab Status: No result Specimen: Blood     COVID only [[id number]]  (Normal) Collected: 09/24/22 0845    Lab Status: Final result Specimen: Nares from Nasopharyngeal Swab Updated: 09/24/22 1010     SARS-CoV-2 Negative    Narrative:      FOR PEDIATRIC PATIENTS - copy/paste COVID Guidelines URL to browser: [email or web address]/  Test.[email or web address]    SARS-CoV-2 assay is a Nucleic Acid Amplification assay intended for the  qualitative detection of nucleic acid from SARS-CoV-2 in nasopharyngeal  swabs  Results are for the presumptive identification of SARS-CoV-2 RNA  Positive results are indicative of infection with SARS-CoV-2, the virus  causing COVID-19, but do not rule out bacterial infection or co-infection  with other viruses  Laboratories within the [place] and its  territories are required to report all positive results to the appropriate  public health authorities  Negative results do not preclude SARS-CoV-2  infection and should not be used as the sole basis for treatment or other  patient management decisions   Negative results must be combined with  clinical observations, patient history, and epidemiological information  This test has not been FDA cleared or approved  This test has been authorized by FDA under an Emergency Use Authorization  (EUA)  This test is only authorized for the duration of time the  declaration that circumstances exist justifying the authorization of the  emergency use of an in vitro diagnostic tests for detection of SARS-CoV-2  virus and/or diagnosis of COVID-19 infection under section 564(b)(1) of  the Act, 21 U  S C  006XYH-7(N)(7), unless the authorization is terminated  or revoked sooner  The test has been validated but independent review by FDA  and CLIA is pending  Test performed using [employer] GeneXpert: This RT-PCR assay targets N2,  a region unique to SARS-CoV-2  A conserved region in the E-gene was chosen  for pan-Sarbecovirus detection which includes SARS-CoV-2  According to CMS-2020-01-R, this platform meets the definition of high-throughput technology      D-Dimer [[id number]]  (Normal) Collected: 09/24/22 0934    Lab Status: Final result Specimen: Blood from Arm, Left Updated: 09/24/22 0956     D-Dimer, Quant 0 34 ug/ml FEU     HS Troponin 0hr (reflex protocol) [[id number]]  (Normal) Collected: 09/24/22 0909    Lab Status: Final result Specimen: Blood from Arm, Left Updated: 09/24/22 0942     hs TnI 0hr <2 ng/L     HS Troponin I 2hr [[id number]]     Lab Status: No result Specimen: Blood     POCT pregnancy, urine [[id number]]  (Normal) Resulted: 09/24/22 0935    Lab Status: Final result Updated: 09/24/22 0935     EXT PREG TEST UR (Ref: Negative) neagtive     Control vaild    Comprehensive metabolic panel [[id number]]  (Abnormal) Collected: 09/24/22 0909    Lab Status: Final result Specimen: Blood from Arm, Left Updated: 09/24/22 0930     Sodium 136 mmol/L      Potassium 5 0 mmol/L      Chloride 105 mmol/L      CO2 27 mmol/L      ANION GAP 4 mmol/L      BUN 16 mg/dL      Creatinine 0 44 mg/dL      Glucose 88 mg/dL      Calcium 8 1 mg/dL      AST 31 U/L      ALT 24 U/L      Alkaline Phosphatase 96 U/L      Total Protein 7 4 g/dL      Albumin 4 1 g/dL      Total Bilirubin 0 72 mg/dL      eGFR 127 ml/min/1 73sq m     Narrative:      Hemolysis  National Kidney Disease Foundation guidelines for Chronic Kidney Disease (CKD):     Stage 1 with normal or high GFR (GFR > 90 mL/min/1 73 square meters)    Stage 2 Mild CKD (GFR = 60-89 mL/min/1 73 square meters)    Stage 3A Moderate CKD (GFR = 45-59 mL/min/1 73 square meters)    Stage 3B Moderate CKD (GFR = 30-44 mL/min/1 73 square meters)    Stage 4 Severe CKD (GFR = 15-29 mL/min/1 73 square meters)    Stage 5 End Stage CKD (GFR <15 mL/min/1 73 square meters)  Note: GFR calculation is accurate only with a steady state creatinine    Lipase [[id number]]  (Normal) Collected: 09/24/22 0909    Lab Status: Final result Specimen: Blood from Arm, Left Updated: 09/24/22 0930     Lipase 54 u/L     Narrative:      Hemolysis    CBC and differential [[id number]] Collected: 09/24/22 0909    Lab Status: Final result Specimen: Blood from Arm, Left Updated: 09/24/22 0920     WBC 9 75 Thousand/uL      RBC 4 83 Million/uL      Hemoglobin 14 6 g/dL      Hematocrit 44 8 %      MCV 93 fL      MCH 30 2 pg      MCHC 32 6 g/dL      RDW 14 6 %      MPV 11 9 fL      Platelets 697 Thousands/uL      nRBC 0 /100 WBCs      Neutrophils Relative 66 %      Immat GRANS % 0 %      Lymphocytes Relative 28 %      Monocytes Relative 5 %      Eosinophils Relative 1 %      Basophils Relative 0 %      Neutrophils Absolute 6 37 Thousands/µL      Immature Grans Absolute 0 02 Thousand/uL      Lymphocytes Absolute 2 74 Thousands/µL      Monocytes Absolute 0 51 Thousand/µL      Eosinophils Absolute 0 09 Thousand/µL      Basophils Absolute 0 02 Thousands/µL                  CT head without contrast   Final Result by Ally Sanders MD (09/24 1043)      No acute intracranial abnormality                    Workstation performed: [id number]         XR chest portable    (Results Pending) Procedures  ECG 12 Lead Documentation Only    Date/Time: 9/24/2022 8:22 AM  Performed by: Flakita Huitron PA-C  Authorized by: Flakita Huitron PA-C     Indications / Diagnosis:  Chest pain  ECG reviewed by me, the ED Provider: yes    Patient location:  ED  Previous ECG:     Comparison to cardiac monitor: Yes    Interpretation:     Interpretation: normal    Rate:     ECG rate:  58    ECG rate assessment: normal    Rhythm:     Rhythm: sinus rhythm    Ectopy:     Ectopy: none    QRS:     QRS axis:  Left  Conduction:     Conduction: normal    ST segments:     ST segments:  Normal  T waves:     T waves: normal    Comments:      Pr 138  QRS 90                 ED Course  ED Course as of 09/24/22 1308   Sat Sep 24, 2022   7553 D-Dimer, Quant: 0 34   1059 Patient was reexamined at this time and informed of laboratory and/or imaging results and was found to be stable for discharge  Return to emergency department criteria was reviewed with the patient who verbalized understanding and was agreeable to discharge and the treatment plan at this time  MDM  Number of Diagnoses or Management Options  Chest pain  Flu-like symptoms  Lab test negative for COVID-19 virus  Diagnosis management comments: All imaging and/or lab testing discussed with patient, strict return to ED precautions discussed  Patient recommended to follow up promptly with appropriate outpatient provider  Patient and/or family members verbalizes understanding and agrees with plan  Patient is stable for discharge      Portions of the record may have been created with voice recognition software  Occasional wrong word or "sound a like" substitutions may have occurred due to the inherent limitations of voice recognition software  Read the chart carefully and recognize, using context, where substitutions have occurred          Disposition  Final diagnoses:   Lab test negative for COVID-19 virus Chest pain   Flu-like symptoms     Time reflects when diagnosis was documented in both MDM as applicable and the Disposition within this note     Time User Action Codes Description Comment    9/24/2022 11:07 AM Abel Ferrell Add [Z20 822] Lab test negative for COVID-19 virus     9/24/2022 11:07 AM Abel Ferrell Add [R07 9] Chest pain     9/24/2022 11:07 AM Abel Ferrell Add [R68 89] Flu-like symptoms       ED Disposition     ED Disposition   Discharge    Condition   Stable    Date/Time   Sat Sep 24, 2022 11:07 AM    Comment   Aries Renae discharge to home/self care  Follow-up Information     Follow up With Specialties Details Why Contact Info    Juhi Dominguez MD Family Medicine Schedule an appointment as soon as possible for a visit in 2 days  [address] [place] [id number]  [facility] [id number] [phone]            Discharge Medication List as of 9/24/2022 11:13 AM      START taking these medications    Details   !! acetaminophen (TYLENOL) 500 mg tablet Take 1 tablet (500 mg total) by mouth every 6 (six) hours as needed for moderate pain, Starting Sat 9/24/2022, Normal      metoclopramide (Reglan) 10 mg tablet Take 1 tablet (10 mg total) by mouth every 6 (six) hours, Starting Sat 9/24/2022, Normal       !! - Potential duplicate medications found  Please discuss with provider        CONTINUE these medications which have NOT CHANGED    Details   !! acetaminophen (TYLENOL) 500 mg tablet Take 1 tablet (500 mg total) by mouth every 6 (six) hours as needed for moderate pain, Starting Mon 5/16/2022, Normal      !! acetaminophen (TYLENOL) 500 mg tablet Take 1 tablet (500 mg total) by mouth every 6 (six) hours as needed (pain), Starting Mon 7/4/2022, Normal      albuterol (2 5 mg/3 mL) 0 083 % nebulizer solution Inhale 2 5 mg every 6 (six) hours as needed, Starting Thu 12/6/2018, Historical Med      !! albuterol (Proventil HFA) 90 mcg/act inhaler Inhale 1-2 puffs every 6 (six) hours as needed for wheezing or shortness of breath, Starting Thu 8/25/2022, Normal      !! albuterol (PROVENTIL HFA,VENTOLIN HFA) 90 mcg/act inhaler Inhale 2 puffs every 4 (four) hours as needed for wheezing, Starting Sun 3/1/2020, Print      benzocaine (ORAJEL) 10 % mucosal gel Apply 1 application to the mouth or throat as needed for mucositis, Starting Mon 7/4/2022, Normal      calcium carbonate (OS-KARINA) 600 MG tablet Take 600 mg by mouth daily, Historical Med      ketorolac (TORADOL) 10 mg tablet Take 1 tablet (10 mg total) by mouth every 6 (six) hours as needed for moderate pain, Starting Wed 3/9/2022, Normal      loratadine (CLARITIN) 10 mg tablet Take 1 tablet (10 mg total) by mouth daily, Starting Tue 2/18/2020, Normal      predniSONE 50 mg tablet Take 1 tablet (50 mg total) by mouth daily, Starting Fri 8/26/2022, Normal       !! - Potential duplicate medications found  Please discuss with provider  No discharge procedures on file      PDMP Review       Value Time User    PDMP Reviewed  Yes 7/5/2022  1:37 PM Norma Rodrigues MD          ED Provider  Electronically Signed by           Lena Reynoso PA-C  09/24/22 8469

## 2022-09-24 NOTE — ED NOTES
Provider notified of pts repeat vitals   Pt continues to be asymptomatic and can be discharged per provider     Lea Heath RN  09/24/22 2083

## 2022-11-29 ENCOUNTER — HOSPITAL ENCOUNTER (EMERGENCY)
Facility: HOSPITAL | Age: 37
Discharge: HOME | End: 2022-11-29
Attending: INTERNAL MEDICINE

## 2022-11-29 VITALS
WEIGHT: 281.6 LBS | BODY MASS INDEX: 55 KG/M2 | SYSTOLIC BLOOD PRESSURE: 113 MMHG | RESPIRATION RATE: 20 BRPM | HEART RATE: 70 BPM | TEMPERATURE: 98.7 F | DIASTOLIC BLOOD PRESSURE: 56 MMHG | OXYGEN SATURATION: 99 %

## 2022-11-29 DIAGNOSIS — Z20.822 CLOSE EXPOSURE TO COVID-19 VIRUS: ICD-10-CM

## 2022-11-29 DIAGNOSIS — J06.9 UPPER RESPIRATORY INFECTION: Primary | ICD-10-CM

## 2022-11-29 NOTE — Clinical Note
Levi Malave was seen and treated in our emergency department on 11/29/2022  Diagnosis:     [facility] Margaret    She may return on this date: If you have any questions or concerns, please don't hesitate to call        Brittanie Cramer MD    ______________________________           _______________          _______________  [facility] Representative                              Date                                Time

## 2022-11-29 NOTE — ED PROVIDER NOTES
HPI: Patient is a 42 y o  female who presents with 2 days of cough, headache, sore throat and fatigue which the patient describes at moderate The patient has had contact with people with similar symptoms  The patient has not taken any medication  Allergies   Allergen Reactions   • Morphine Hives   • Mucilin [Psyllium] Hives   • Seasonal Ic  [Cholestatin]    • Aspirin Other (See Comments) and Palpitations     tachycardia     • Guaifenesin Rash       Past Medical History:   Diagnosis Date   • Asthma    • Seasonal allergies       Past Surgical History:   Procedure Laterality Date   •  SECTION      five   •  SECTION     • CHOLECYSTECTOMY     • GASTRECTOMY SLEEVE LAPAROSCOPIC  10/12/2021   • TONSILLECTOMY       Social History     Tobacco Use   • Smoking status: Every Day     Packs/day: 0 25     Types: Cigarettes   • Smokeless tobacco: Never   Vaping Use   • Vaping Use: Never used   Substance Use Topics   • Alcohol use: Yes     Comment: occassionally   • Drug use: Yes     Types: Marijuana     Comment: medical       Nursing notes reviewed  Physical Exam:  ED Triage Vitals [22 1300]   Temperature Pulse Respirations Blood Pressure SpO2   98 7 °F (37 1 °C) 70 20 113/56 99 %      Temp Source Heart Rate Source Patient Position - Orthostatic VS BP Location FiO2 (%)   Oral Monitor Sitting Right arm --      Pain Score       --           ROS: Positive for cough, headache, sore throat and fatigue, the remainder of a 10 organ system ROS was otherwise unremarkable    General: awake, alert, no acute distress    Head: normocephalic, atraumatic    Eyes: no scleral icterus  Ears: external ears normal, hearing grossly intact  Nose: external exam grossly normal, positive nasal discharge  Neck: symmetric, No JVD noted, trachea midline  Pulmonary: no respiratory distress, no tachypnea noted  Cardiovascular: appears well perfused  Abdomen: no distention noted  Musculoskeletal: no deformities noted, tone normal  Neuro: grossly non-focal  Psych: mood and affect appropriate    The patient is stable and has a history and physical exam consistent with a viral illness  COVID19 testing has been performed  I considered the patient's other medical conditions as applicable/noted above in my medical decision making  The patient is stable upon discharge  The plan is for supportive care at home  The patient (and any family present) verbalized understanding of the discharge instructions and warnings that would necessitate return to the Emergency Department  All questions were answered prior to discharge  Medications - No data to display  Final diagnoses:   Upper respiratory infection   Close exposure to COVID-19 virus     Time reflects when diagnosis was documented in both MDM as applicable and the Disposition within this note     Time User Action Codes Description Comment    11/29/2022  1:08 PM Erika Shepard Add [J06 9] Upper respiratory infection     11/29/2022  1:08 PM Erika Shepard Add [Z20 822] Close exposure to COVID-19 virus       ED Disposition     ED Disposition   Discharge    Condition   Stable    Date/Time   Tue Nov 29, 2022  1:08 PM    [address] [place] [address] discharge to home/self care  Follow-up Information     Follow up With Specialties Details Why Contact Info    Trice Max MD Family Medicine Call  As needed [address] [address]  [place] [address]  [phone]          Patient's Medications   Discharge Prescriptions    No medications on file     No discharge procedures on file      Electronically Signed by       Torres Mahan MD  11/29/22 9337

## 2022-11-30 LAB
FLUAV RNA RESP QL NAA+PROBE: NEGATIVE
FLUBV RNA RESP QL NAA+PROBE: NEGATIVE
SARS-COV-2 RNA RESP QL NAA+PROBE: NEGATIVE

## 2023-01-10 ENCOUNTER — APPOINTMENT (EMERGENCY)
Dept: CT IMAGING | Facility: HOSPITAL | Age: 38
End: 2023-01-10

## 2023-01-10 ENCOUNTER — APPOINTMENT (EMERGENCY)
Dept: NON INVASIVE DIAGNOSTICS | Facility: HOSPITAL | Age: 38
End: 2023-01-10

## 2023-01-10 ENCOUNTER — HOSPITAL ENCOUNTER (EMERGENCY)
Facility: HOSPITAL | Age: 38
Discharge: HOME | End: 2023-01-10
Attending: INTERNAL MEDICINE

## 2023-01-10 VITALS
DIASTOLIC BLOOD PRESSURE: 74 MMHG | TEMPERATURE: 97.9 F | RESPIRATION RATE: 18 BRPM | SYSTOLIC BLOOD PRESSURE: 125 MMHG | HEART RATE: 60 BPM | BODY MASS INDEX: 55.24 KG/M2 | WEIGHT: 282.85 LBS | OXYGEN SATURATION: 100 %

## 2023-01-10 DIAGNOSIS — Z98.84 HISTORY OF GASTRIC BYPASS: ICD-10-CM

## 2023-01-10 DIAGNOSIS — R10.9 FLANK PAIN: Primary | ICD-10-CM

## 2023-01-10 DIAGNOSIS — R06.00 DYSPNEA: ICD-10-CM

## 2023-01-10 DIAGNOSIS — Z78.9 ALCOHOL USE: ICD-10-CM

## 2023-01-10 DIAGNOSIS — M25.473 ANKLE SWELLING: ICD-10-CM

## 2023-01-10 LAB
ALBUMIN SERPL BCP-MCNC: 3.5 G/DL (ref 3.5–5)
ALP SERPL-CCNC: 119 U/L (ref 43–122)
ALT SERPL W P-5'-P-CCNC: 24 U/L
ANION GAP SERPL CALCULATED.3IONS-SCNC: 5 MMOL/L (ref 5–14)
AST SERPL W P-5'-P-CCNC: 24 U/L (ref 14–36)
B-HCG SERPL-ACNC: <3 MIU/ML
BACTERIA UR QL AUTO: ABNORMAL /HPF
BASOPHILS # BLD AUTO: 0.02 THOUSANDS/ÂΜL (ref 0–0.1)
BASOPHILS NFR BLD AUTO: 0 % (ref 0–1)
BILIRUB SERPL-MCNC: 0.67 MG/DL (ref 0.2–1)
BILIRUB UR QL STRIP: NEGATIVE
BUN SERPL-MCNC: 14 MG/DL (ref 5–25)
CALCIUM SERPL-MCNC: 8.7 MG/DL (ref 8.4–10.2)
CAOX CRY URNS QL MICRO: ABNORMAL /HPF
CARDIAC TROPONIN I PNL SERPL HS: 2 NG/L
CHLORIDE SERPL-SCNC: 107 MMOL/L (ref 96–108)
CLARITY UR: CLEAR
CO2 SERPL-SCNC: 25 MMOL/L (ref 21–32)
COLOR UR: ABNORMAL
CREAT SERPL-MCNC: 0.52 MG/DL (ref 0.6–1.2)
D DIMER PPP FEU-MCNC: 0.27 UG/ML FEU
EOSINOPHIL # BLD AUTO: 0.05 THOUSAND/ÂΜL (ref 0–0.61)
EOSINOPHIL NFR BLD AUTO: 1 % (ref 0–6)
ERYTHROCYTE [DISTWIDTH] IN BLOOD BY AUTOMATED COUNT: 15.1 % (ref 11.6–15.1)
EXT PREGNANCY TEST URINE: NEGATIVE
EXT. CONTROL: NORMAL
FLUAV RNA RESP QL NAA+PROBE: NEGATIVE
FLUBV RNA RESP QL NAA+PROBE: NEGATIVE
GFR SERPL CREATININE-BSD FRML MDRD: 120 ML/MIN/1.73SQ M
GLUCOSE SERPL-MCNC: 103 MG/DL (ref 70–99)
GLUCOSE UR STRIP-MCNC: NEGATIVE MG/DL
HCT VFR BLD AUTO: 42.9 % (ref 34.8–46.1)
HGB BLD-MCNC: 13.9 G/DL (ref 11.5–15.4)
HGB UR QL STRIP.AUTO: NEGATIVE
IMM GRANULOCYTES # BLD AUTO: 0.02 THOUSAND/UL (ref 0–0.2)
IMM GRANULOCYTES NFR BLD AUTO: 0 % (ref 0–2)
KETONES UR STRIP-MCNC: NEGATIVE MG/DL
LEUKOCYTE ESTERASE UR QL STRIP: NEGATIVE
LIPASE SERPL-CCNC: 57 U/L (ref 23–300)
LYMPHOCYTES # BLD AUTO: 2.33 THOUSANDS/ÂΜL (ref 0.6–4.47)
LYMPHOCYTES NFR BLD AUTO: 26 % (ref 14–44)
MCH RBC QN AUTO: 29.3 PG (ref 26.8–34.3)
MCHC RBC AUTO-ENTMCNC: 32.4 G/DL (ref 31.4–37.4)
MCV RBC AUTO: 91 FL (ref 82–98)
MONOCYTES # BLD AUTO: 0.5 THOUSAND/ÂΜL (ref 0.17–1.22)
MONOCYTES NFR BLD AUTO: 6 % (ref 4–12)
MUCOUS THREADS UR QL AUTO: ABNORMAL
NEUTROPHILS # BLD AUTO: 5.93 THOUSANDS/ÂΜL (ref 1.85–7.62)
NEUTS SEG NFR BLD AUTO: 67 % (ref 43–75)
NITRITE UR QL STRIP: NEGATIVE
NON-SQ EPI CELLS URNS QL MICRO: ABNORMAL /HPF
NRBC BLD AUTO-RTO: 0 /100 WBCS
PH UR STRIP.AUTO: 6 [PH]
PLATELET # BLD AUTO: 265 THOUSANDS/UL (ref 149–390)
PMV BLD AUTO: 11.1 FL (ref 8.9–12.7)
POTASSIUM SERPL-SCNC: 3.9 MMOL/L (ref 3.5–5.3)
PROT SERPL-MCNC: 6.6 G/DL (ref 6.4–8.4)
PROT UR STRIP-MCNC: ABNORMAL MG/DL
RBC # BLD AUTO: 4.74 MILLION/UL (ref 3.81–5.12)
RBC #/AREA URNS AUTO: ABNORMAL /HPF
RSV RNA RESP QL NAA+PROBE: NEGATIVE
SARS-COV-2 RNA RESP QL NAA+PROBE: NEGATIVE
SODIUM SERPL-SCNC: 137 MMOL/L (ref 135–147)
SP GR UR STRIP.AUTO: 1.02 (ref 1–1.04)
UROBILINOGEN UA: NEGATIVE MG/DL
WBC # BLD AUTO: 8.85 THOUSAND/UL (ref 4.31–10.16)
WBC #/AREA URNS AUTO: ABNORMAL /HPF

## 2023-01-10 RX ORDER — ACETAMINOPHEN 325 MG/1
650 TABLET ORAL ONCE
Status: COMPLETED | OUTPATIENT
Start: 2023-01-10 | End: 2023-01-10

## 2023-01-10 RX ADMIN — ACETAMINOPHEN 650 MG: 325 TABLET ORAL at 12:00

## 2023-01-10 RX ADMIN — SODIUM CHLORIDE 1000 ML: 0.9 INJECTION, SOLUTION INTRAVENOUS at 10:09

## 2023-01-10 NOTE — ED PROVIDER NOTES
History  Chief Complaint   Patient presents with   • Flank Pain     Right flank pain that travels down right leg and to lower abdomen  Pt reports SOB r/t pain  Took tylenol at 7am     HPI  75-year-old with a history of gastric bypass, morbid obesity, asthma and allergies presents to ED for multiple complaints  Patient reports right flank pain  She reports that the flank pain is sharp and intermittent  It radiates to her lower abdomen  She reports shortness of breath  She reports right ankle swelling  She reports right ankle calf pain  Patient reports symptoms started after binge drinking episode 2 days ago  She states yesterday she was recovering and felt terrible all day  Today she woke up and still felt bad so she decided to come to the ER for evaluation  Patient denies headaches, vertigo, lightheadedness, visual changes, fevers, chills, chest pain, palpitations, abdominal pain, diarrhea, melena, hematochezia, dysuria, new skin rashes or numbness or tingling of the extremities  Prior to Admission Medications   Prescriptions Last Dose Informant Patient Reported?  Taking?   acetaminophen (TYLENOL) 500 mg tablet   No No   Sig: Take 1 tablet (500 mg total) by mouth every 6 (six) hours as needed for moderate pain   acetaminophen (TYLENOL) 500 mg tablet   No No   Sig: Take 1 tablet (500 mg total) by mouth every 6 (six) hours as needed (pain)   acetaminophen (TYLENOL) 500 mg tablet   No No   Sig: Take 1 tablet (500 mg total) by mouth every 6 (six) hours as needed for moderate pain   albuterol (2 5 mg/3 mL) 0 083 % nebulizer solution   Yes No   Sig: Inhale 2 5 mg every 6 (six) hours as needed   albuterol (PROVENTIL HFA,VENTOLIN HFA) 90 mcg/act inhaler   No No   Sig: Inhale 2 puffs every 4 (four) hours as needed for wheezing   albuterol (Proventil HFA) 90 mcg/act inhaler   No No   Sig: Inhale 1-2 puffs every 6 (six) hours as needed for wheezing or shortness of breath   benzocaine (ORAJEL) 10 % mucosal gel No No   Sig: Apply 1 application to the mouth or throat as needed for mucositis   calcium carbonate (OS-KARINA) 600 MG tablet   Yes No   Sig: Take 600 mg by mouth daily   ketorolac (TORADOL) 10 mg tablet   No No   Sig: Take 1 tablet (10 mg total) by mouth every 6 (six) hours as needed for moderate pain   loratadine (CLARITIN) 10 mg tablet   No No   Sig: Take 1 tablet (10 mg total) by mouth daily   metoclopramide (Reglan) 10 mg tablet   No No   Sig: Take 1 tablet (10 mg total) by mouth every 6 (six) hours   predniSONE 50 mg tablet   No No   Sig: Take 1 tablet (50 mg total) by mouth daily      Facility-Administered Medications: None       Past Medical History:   Diagnosis Date   • Asthma    • Seasonal allergies        Past Surgical History:   Procedure Laterality Date   •  SECTION      five   •  SECTION     • CHOLECYSTECTOMY     • GASTRECTOMY SLEEVE LAPAROSCOPIC  10/12/2021   • TONSILLECTOMY         History reviewed  No pertinent family history  I have reviewed and agree with the history as documented  E-Cigarette/Vaping   • E-Cigarette Use Never User      E-Cigarette/Vaping Substances     Social History     Tobacco Use   • Smoking status: Every Day     Packs/day: 0 25     Types: Cigarettes   • Smokeless tobacco: Never   Vaping Use   • Vaping Use: Never used   Substance Use Topics   • Alcohol use: Yes     Comment: occassionally   • Drug use: Yes     Types: Marijuana     Comment: medical       Review of Systems   All other systems reviewed and are negative  Physical Exam  Physical Exam  Vitals and nursing note reviewed  Constitutional:       General: She is not in acute distress  Appearance: She is well-developed  Comments: She is sitting up with her feet tucked under her legs, talking to friend   HENT:      Head: Normocephalic and atraumatic  Eyes:      Conjunctiva/sclera: Conjunctivae normal    Cardiovascular:      Rate and Rhythm: Normal rate and regular rhythm        Heart sounds: No murmur heard  Pulmonary:      Effort: Pulmonary effort is normal  No respiratory distress  Breath sounds: Normal breath sounds  Abdominal:      Palpations: Abdomen is soft  Tenderness: There is no abdominal tenderness  Musculoskeletal:         General: No swelling  Cervical back: Neck supple  Skin:     General: Skin is warm and dry  Capillary Refill: Capillary refill takes less than 2 seconds  Neurological:      Mental Status: She is alert  Psychiatric:         Mood and Affect: Mood normal          Vital Signs  ED Triage Vitals [01/10/23 0806]   Temperature Pulse Respirations Blood Pressure SpO2   97 9 °F (36 6 °C) 62 20 122/76 95 %      Temp Source Heart Rate Source Patient Position - Orthostatic VS BP Location FiO2 (%)   Oral Monitor Sitting Left arm --      Pain Score       --           Vitals:    01/10/23 0806 01/10/23 1144   BP: 122/76 125/74   Pulse: 62 60   Patient Position - Orthostatic VS: Sitting Sitting         Visual Acuity      ED Medications  Medications   sodium chloride 0 9 % bolus 1,000 mL (0 mL Intravenous Stopped 1/10/23 1242)   acetaminophen (TYLENOL) tablet 650 mg (650 mg Oral Given 1/10/23 1200)       Diagnostic Studies  Results Reviewed     Procedure Component Value Units Date/Time    COVID/FLU/RSV [[id number]]  (Normal) Collected: 01/10/23 1009    Lab Status: Final result Specimen: Nares from Nose Updated: 01/10/23 1055     SARS-CoV-2 Negative     INFLUENZA A PCR Negative     INFLUENZA B PCR Negative     RSV PCR Negative    Narrative:      FOR PEDIATRIC PATIENTS - copy/paste COVID Guidelines URL to browser: [email or web address]    SARS-CoV-2 assay is a Nucleic Acid Amplification assay intended for the  qualitative detection of nucleic acid from SARS-CoV-2 in nasopharyngeal  swabs  Results are for the presumptive identification of SARS-CoV-2 RNA      Positive results are indicative of infection with SARS-CoV-2, the virus  causing COVID-19, but do not rule out bacterial infection or co-infection  with other viruses  Laboratories within the [place] and its  territories are required to report all positive results to the appropriate  public health authorities  Negative results do not preclude SARS-CoV-2  infection and should not be used as the sole basis for treatment or other  patient management decisions  Negative results must be combined with  clinical observations, patient history, and epidemiological information  This test has not been FDA cleared or approved  This test has been authorized by FDA under an Emergency Use Authorization  (EUA)  This test is only authorized for the duration of time the  declaration that circumstances exist justifying the authorization of the  emergency use of an in vitro diagnostic tests for detection of SARS-CoV-2  virus and/or diagnosis of COVID-19 infection under section 564(b)(1) of  the Act, 21 U  S C  372JVH-1(U)(8), unless the authorization is terminated  or revoked sooner  The test has been validated but independent review by FDA  and CLIA is pending  Test performed using [employer] GeneXpert: This RT-PCR assay targets N2,  a region unique to SARS-CoV-2  A conserved region in the E-gene was chosen  for pan-Sarbecovirus detection which includes SARS-CoV-2  According to CMS-2020-01-R, this platform meets the definition of high-throughput technology      Urine Microscopic [[id number]]  (Abnormal) Collected: 01/10/23 0911    Lab Status: Final result Specimen: Urine, Clean Catch Updated: 01/10/23 1019     RBC, UA 0-1 /hpf      WBC, UA 0-1 /hpf      Epithelial Cells Occasional /hpf      Bacteria, UA Occasional /hpf      Ca Oxalate Ale, UA Occasional /hpf      MUCUS THREADS Occasional    UA w Reflex to Microscopic w Reflex to Culture [[id number]]  (Abnormal) Collected: 01/10/23 0911    Lab Status: Final result Specimen: Urine, Clean Catch Updated: 01/10/23 7297 Color, UA Helen     Clarity, UA Clear     Specific Gravity, UA 1 025     pH, UA 6 0     Leukocytes, UA Negative     Nitrite, UA Negative     Protein, UA 15 (Trace) mg/dl      Glucose, UA Negative mg/dl      Ketones, UA Negative mg/dl      Bilirubin, UA Negative     Occult Blood, UA Negative     UROBILINOGEN UA Negative mg/dL     Pregnancy, hCG, quantitative [[id number]]  (Normal) Collected: 01/10/23 0845    Lab Status: Final result Specimen: Blood from Arm, Right Updated: 01/10/23 0918     HCG, Quant <3 mIU/mL     Narrative:       Expected Ranges:     Approximate               Approximate HCG  Gestation age          Concentration ( mIU/mL)  _____________          ______________________   Yamileth Graven                      HCG values  0 2-1                       5-50  1-2                           2-3                         100-5000  3-4                         500-91111  4-5                         1000-60504  5-6                         53922-000855  6-8                         [id number]  8-12                        09754-272997      D-dimer, quantitative [[id number]]  (Normal) Collected: 01/10/23 0845    Lab Status: Final result Specimen: Blood from Arm, Right Updated: 01/10/23 0916     D-Dimer, Quant 0 27 ug/ml FEU     HS Troponin 0hr (reflex protocol) [[id number]]  (Normal) Collected: 01/10/23 0845    Lab Status: Final result Specimen: Blood from Arm, Right Updated: 01/10/23 0915     hs TnI 0hr 2 ng/L     POCT pregnancy, urine [[id number]]  (Normal) Resulted: 01/10/23 0914    Lab Status: Final result Specimen: Urine Updated: 01/10/23 0914     EXT Preg Test, Ur Negative     Control Valid    Lipase [[id number]]  (Normal) Collected: 01/10/23 0845    Lab Status: Final result Specimen: Blood from Arm, Right Updated: 01/10/23 0904     Lipase 57 u/L     Comprehensive metabolic panel [[id number]]  (Abnormal) Collected: 01/10/23 0845    Lab Status: Final result Specimen: Blood from Arm, Right Updated: 01/10/23 6652 Sodium 137 mmol/L      Potassium 3 9 mmol/L      Chloride 107 mmol/L      CO2 25 mmol/L      ANION GAP 5 mmol/L      BUN 14 mg/dL      Creatinine 0 52 mg/dL      Glucose 103 mg/dL      Calcium 8 7 mg/dL      AST 24 U/L      ALT 24 U/L      Alkaline Phosphatase 119 U/L      Total Protein 6 6 g/dL      Albumin 3 5 g/dL      Total Bilirubin 0 67 mg/dL      eGFR 120 ml/min/1 73sq m     Narrative:      National Kidney Disease Foundation guidelines for Chronic Kidney Disease (CKD):   •  Stage 1 with normal or high GFR (GFR > 90 mL/min/1 73 square meters)  •  Stage 2 Mild CKD (GFR = 60-89 mL/min/1 73 square meters)  •  Stage 3A Moderate CKD (GFR = 45-59 mL/min/1 73 square meters)  •  Stage 3B Moderate CKD (GFR = 30-44 mL/min/1 73 square meters)  •  Stage 4 Severe CKD (GFR = 15-29 mL/min/1 73 square meters)  •  Stage 5 End Stage CKD (GFR <15 mL/min/1 73 square meters)  Note: GFR calculation is accurate only with a steady state creatinine    CBC and differential [[id number]] Collected: 01/10/23 0845    Lab Status: Final result Specimen: Blood from Arm, Right Updated: 01/10/23 0852     WBC 8 85 Thousand/uL      RBC 4 74 Million/uL      Hemoglobin 13 9 g/dL      Hematocrit 42 9 %      MCV 91 fL      MCH 29 3 pg      MCHC 32 4 g/dL      RDW 15 1 %      MPV 11 1 fL      Platelets 061 Thousands/uL      nRBC 0 /100 WBCs      Neutrophils Relative 67 %      Immat GRANS % 0 %      Lymphocytes Relative 26 %      Monocytes Relative 6 %      Eosinophils Relative 1 %      Basophils Relative 0 %      Neutrophils Absolute 5 93 Thousands/µL      Immature Grans Absolute 0 02 Thousand/uL      Lymphocytes Absolute 2 33 Thousands/µL      Monocytes Absolute 0 50 Thousand/µL      Eosinophils Absolute 0 05 Thousand/µL      Basophils Absolute 0 02 Thousands/µL                  CT renal stone study abdomen pelvis wo contrast   Final Result by Fernanda Heath MD (01/10 7357)      No acute findings in the abdomen or pelvis        Workstation performed: [id number]         VAS lower limb venous duplex study, unilateral/limited    (Results Pending)              Procedures  Procedures         ED Course  ED Course as of 01/10/23 1300   Tue Carlos 10, 2023   0932 His duplex negative for DVT   1252 CT abdomen pelvis: No acute findings in the abdomen or pelvis  Medical Decision Making  40-year-old with a history of gastric bypass, morbid obesity, asthma and allergies presents to ED for multiple complaints  The patient denies association with alcohol use, vomiting, eating, position, or pain radiating to the neck, arms, abdomen, or back  Pulses in extremities are  palpable and symmetric  Breath sounds  present bilaterally and the patient no respiratory distress  Patient does complain of dyspnea, will get D-dimer to rule rule out PE  On exam the patient has high oxygen saturation  Her lung sounds are normal   Heart sounds are normal   Patient does have unilateral leg swelling and calf tenderness  Will rule out DVT  Patient also has abdominal pain along with many other complaints  Will obtain CBC and CMP to rule out abdominal pathology  No pain out of proportion to exam or worsening of pain with or shortly after eating  Pain not colicky  No peritoneal signs on exam   Patient has no pulsatile abdominal mass, known aneurysm, pulse deficit or asymmetry, tearing or ripping pain  No tenderness at McBurney's point and no positive Fowler sign, or Rovsing sign  No urinary complains, or history of urolithiasis  No history of abdominal trauma or sickle cell disease  EKG unremarkable  Patient does does have a hx of abdominal surgeries    Patient is not pregnant  Vital signs normal, no fever        Patient left before work-up could be completed              Alcohol use: acute illness or injury  Ankle swelling: acute illness or injury  Dyspnea: acute illness or injury  Flank pain: acute illness or injury  History of gastric bypass: chronic illness or injury  Amount and/or Complexity of Data Reviewed  External Data Reviewed: labs, radiology and ECG  Labs: ordered  Decision-making details documented in ED Course  Radiology: ordered and independent interpretation performed  ECG/medicine tests: ordered and independent interpretation performed  Risk  OTC drugs  Prescription drug management  Disposition  Final diagnoses:   Flank pain   Dyspnea   Ankle swelling   Alcohol use   History of gastric bypass     Time reflects when diagnosis was documented in both MDM as applicable and the Disposition within this note     Time User Action Codes Description Comment    1/10/2023 12:45 PM Marshel Liming Add [R10 9] Flank pain     1/10/2023 12:46 PM Marshel Liming Add [R06 00] Dyspnea     1/10/2023 12:47 PM Marshel Liming Add [M25 473] Ankle swelling     1/10/2023  1:00 PM Ramona Shah [Z78 9] Alcohol use     1/10/2023  1:00 PM Ramona Shah [Z98 84] History of gastric bypass       ED Disposition     ED Disposition   Left from Room after Provider Exam    Condition   --    Date/Time   Tue Carlos 10, 2023 12:45 PM    Comment   --         Follow-up Information    None         Patient's Medications   Discharge Prescriptions    No medications on file       No discharge procedures on file      PDMP Review       Value Time User    PDMP Reviewed  Yes 7/5/2022  1:37 PM Caty Olguin MD          ED Provider  Electronically Signed by           Anna Hoffman MD  01/10/23 1300

## 2023-01-10 NOTE — ED NOTES
Pt heard screaming loudly on the phone, entered room to check on pt, pt states "my child is missing"  "the school lost my kid" "I have go now"  IV removed and pt got dressed and left ED promptly    Provider made aware     Preston Perera RN  01/10/23 6583

## 2023-01-10 NOTE — ED NOTES
Before pt left I verified pts phone number to have dr call with results        Zenobia Tyson RN  01/10/23 1300

## 2023-01-11 LAB
ATRIAL RATE: 58 BPM
P AXIS: 23 DEGREES
PR INTERVAL: 134 MS
QRS AXIS: 58 DEGREES
QRSD INTERVAL: 84 MS
QT INTERVAL: 444 MS
QTC INTERVAL: 435 MS
T WAVE AXIS: 23 DEGREES
VENTRICULAR RATE: 58 BPM

## 2023-07-02 ENCOUNTER — HOSPITAL ENCOUNTER (EMERGENCY)
Facility: HOSPITAL | Age: 38
Discharge: HOME | End: 2023-07-02
Attending: EMERGENCY MEDICINE | Admitting: EMERGENCY MEDICINE
Payer: MEDICARE

## 2023-07-02 VITALS
DIASTOLIC BLOOD PRESSURE: 80 MMHG | RESPIRATION RATE: 16 BRPM | TEMPERATURE: 97.2 F | OXYGEN SATURATION: 98 % | SYSTOLIC BLOOD PRESSURE: 124 MMHG | BODY MASS INDEX: 58.73 KG/M2 | WEIGHT: 293 LBS | HEART RATE: 77 BPM

## 2023-07-02 DIAGNOSIS — F41.0 PANIC ATTACK: Primary | ICD-10-CM

## 2023-07-02 LAB
ATRIAL RATE: 73 BPM
P AXIS: 23 DEGREES
PR INTERVAL: 106 MS
QRS AXIS: 68 DEGREES
QRSD INTERVAL: 78 MS
QT INTERVAL: 402 MS
QTC INTERVAL: 442 MS
T WAVE AXIS: 18 DEGREES
VENTRICULAR RATE: 73 BPM

## 2023-07-02 PROCEDURE — 94640 AIRWAY INHALATION TREATMENT: CPT

## 2023-07-02 PROCEDURE — 93005 ELECTROCARDIOGRAM TRACING: CPT

## 2023-07-02 PROCEDURE — 93010 ELECTROCARDIOGRAM REPORT: CPT | Performed by: STUDENT IN AN ORGANIZED HEALTH CARE EDUCATION/TRAINING PROGRAM

## 2023-07-02 PROCEDURE — 99284 EMERGENCY DEPT VISIT MOD MDM: CPT

## 2023-07-02 RX ORDER — ALPRAZOLAM 0.5 MG/1
1 TABLET ORAL ONCE
Status: COMPLETED | OUTPATIENT
Start: 2023-07-02 | End: 2023-07-02

## 2023-07-02 RX ORDER — ALBUTEROL SULFATE 2.5 MG/3ML
5 SOLUTION RESPIRATORY (INHALATION) ONCE
Status: COMPLETED | OUTPATIENT
Start: 2023-07-02 | End: 2023-07-02

## 2023-07-02 RX ADMIN — IPRATROPIUM BROMIDE 0.5 MG: 0.5 SOLUTION RESPIRATORY (INHALATION) at 10:54

## 2023-07-02 RX ADMIN — ALBUTEROL SULFATE 5 MG: 2.5 SOLUTION RESPIRATORY (INHALATION) at 10:54

## 2023-07-02 RX ADMIN — ALPRAZOLAM 1 MG: 0.5 TABLET ORAL at 11:21

## 2023-07-02 NOTE — Clinical Note
Yudelka Vee was seen and treated in our emergency department on 7/2/2023. No restrictions            Diagnosis:     Aicha  . She may return on this date: 07/04/2023         If you have any questions or concerns, please don't hesitate to call.       Meme Bashir PA-C    ______________________________           _______________          _______________  Hospital Representative                              Date                                Time

## 2023-07-02 NOTE — ED NOTES
Patient speaking on the phone, appears more calm, requesting water, water provided     Jonathon Whitt RN  07/02/23 0752

## 2023-07-02 NOTE — ED PROVIDER NOTES
History  Chief Complaint   Patient presents with   • Shortness of Breath     Arrives reporting having asthma exacerbation since last night; used pump without relief. Now breathing fast and states she thinks she might also be having an anxiety attack. Patient is a 78-year-old female coming in for evaluation of a panic attack. States that she has had a stressful night, or because her 15year-old ran away, she has been all night looking for her. Patient is in no acute distress this time, admits to chest tightness, but no chest pain. On initial interview, patient was utilizing a DuoNeb ordered by triage protocol. Patient does have a prescription for Xanax, but has not been taking it      Shortness of Breath  Associated symptoms: no abdominal pain, no chest pain, no fever, no sore throat, no vomiting and no wheezing        Prior to Admission Medications   Prescriptions Last Dose Informant Patient Reported?  Taking?   acetaminophen (TYLENOL) 500 mg tablet   No No   Sig: Take 1 tablet (500 mg total) by mouth every 6 (six) hours as needed for moderate pain   acetaminophen (TYLENOL) 500 mg tablet   No No   Sig: Take 1 tablet (500 mg total) by mouth every 6 (six) hours as needed (pain)   acetaminophen (TYLENOL) 500 mg tablet   No No   Sig: Take 1 tablet (500 mg total) by mouth every 6 (six) hours as needed for moderate pain   albuterol (2.5 mg/3 mL) 0.083 % nebulizer solution   Yes No   Sig: Inhale 2.5 mg every 6 (six) hours as needed   albuterol (PROVENTIL HFA,VENTOLIN HFA) 90 mcg/act inhaler   No No   Sig: Inhale 2 puffs every 4 (four) hours as needed for wheezing   albuterol (Proventil HFA) 90 mcg/act inhaler   No No   Sig: Inhale 1-2 puffs every 6 (six) hours as needed for wheezing or shortness of breath   benzocaine (ORAJEL) 10 % mucosal gel   No No   Sig: Apply 1 application to the mouth or throat as needed for mucositis   calcium carbonate (OS-KARINA) 600 MG tablet  Self Yes No   Sig: Take 600 mg by mouth daily ketorolac (TORADOL) 10 mg tablet   No No   Sig: Take 1 tablet (10 mg total) by mouth every 6 (six) hours as needed for moderate pain   loratadine (CLARITIN) 10 mg tablet   No No   Sig: Take 1 tablet (10 mg total) by mouth daily   metoclopramide (Reglan) 10 mg tablet   No No   Sig: Take 1 tablet (10 mg total) by mouth every 6 (six) hours   predniSONE 50 mg tablet   No No   Sig: Take 1 tablet (50 mg total) by mouth daily      Facility-Administered Medications: None       Past Medical History:   Diagnosis Date   • Asthma    • Seasonal allergies        Past Surgical History:   Procedure Laterality Date   •  SECTION      five   •  SECTION     • CHOLECYSTECTOMY     • GASTRECTOMY SLEEVE LAPAROSCOPIC  10/12/2021   • TONSILLECTOMY         History reviewed. No pertinent family history. I have reviewed and agree with the history as documented. E-Cigarette/Vaping   • E-Cigarette Use Never User      E-Cigarette/Vaping Substances     Social History     Tobacco Use   • Smoking status: Every Day     Packs/day: 0.25     Types: Cigarettes   • Smokeless tobacco: Never   Vaping Use   • Vaping Use: Never used   Substance Use Topics   • Alcohol use: Yes     Comment: occassionally   • Drug use: Yes     Types: Marijuana     Comment: medical       Review of Systems   Constitutional: Negative for chills, fatigue and fever. HENT: Negative for sore throat. Respiratory: Positive for chest tightness and shortness of breath. Negative for wheezing. Cardiovascular: Negative for chest pain. Gastrointestinal: Negative for abdominal pain, nausea and vomiting. Physical Exam  Physical Exam  Vitals reviewed. Constitutional:       Appearance: Normal appearance. She is normal weight. HENT:      Head: Normocephalic and atraumatic.       Right Ear: External ear normal.      Left Ear: External ear normal.      Nose: Nose normal.   Eyes:      Conjunctiva/sclera: Conjunctivae normal.   Cardiovascular:      Rate and Rhythm: Normal rate. Pulmonary:      Effort: Pulmonary effort is normal.      Breath sounds: Normal breath sounds. No decreased breath sounds. Musculoskeletal:         General: Normal range of motion. Cervical back: Normal range of motion. Skin:     General: Skin is warm and dry. Neurological:      Mental Status: She is alert. Vital Signs  ED Triage Vitals [07/02/23 1107]   Temperature Pulse Respirations Blood Pressure SpO2   (!) 97.2 °F (36.2 °C) 77 16 124/80 98 %      Temp Source Heart Rate Source Patient Position - Orthostatic VS BP Location FiO2 (%)   Oral Monitor Sitting Left arm --      Pain Score       --           Vitals:    07/02/23 1107   BP: 124/80   Pulse: 77   Patient Position - Orthostatic VS: Sitting         Visual Acuity      ED Medications  Medications   albuterol inhalation solution 5 mg (5 mg Nebulization Given 7/2/23 1054)     And   ipratropium (ATROVENT) 0.02 % inhalation solution 0.5 mg (0.5 mg Nebulization Given 7/2/23 1054)   ALPRAZolam (XANAX) tablet 1 mg (1 mg Oral Given 7/2/23 1121)       Diagnostic Studies  Results Reviewed     None                 No orders to display              Procedures  Procedures         ED Course                               SBIRT 20yo+    Flowsheet Row Most Recent Value   Initial Alcohol Screen: US AUDIT-C     1. How often do you have a drink containing alcohol? 0 Filed at: 07/02/2023 1055   2. How many drinks containing alcohol do you have on a typical day you are drinking? 0 Filed at: 07/02/2023 1055   3b. FEMALE Any Age, or MALE 65+: How often do you have 4 or more drinks on one occassion? 0 Filed at: 07/02/2023 1055   Audit-C Score 0 Filed at: 07/02/2023 1055   DENNY: How many times in the past year have you. .. Used an illegal drug or used a prescription medication for non-medical reasons?  Never Filed at: 07/02/2023 1055                    Medical Decision Making  Patient is a 33-year-old female who comes in for evaluation of a panic attack. She is in no acute distress at this time, does feel better after Xanax and a DuoNeb. Patient was discharged home with supportive recommendations    Risk  Prescription drug management. Disposition  Final diagnoses:   Panic attack     Time reflects when diagnosis was documented in both MDM as applicable and the Disposition within this note     Time User Action Codes Description Comment    7/2/2023 12:48 PM Katherine Najera Add [F41.0] Panic attack       ED Disposition     ED Disposition   Discharge    Condition   Stable    Date/Time   Sun Jul 2, 2023 12:48 PM    [facility] discharge to home/self care.                Follow-up Information     Follow up With Specialties Details Why Contact Info Additional Information    [facility], [address] [facility], [address] [place] [address]  [address]  [address] [address] [address] [facility] Emergency Department Emergency Medicine  As needed, If symptoms worsen [address] [phone] [id number] [facility] Emergency Department          Discharge Medication List as of 7/2/2023 12:50 PM      CONTINUE these medications which have NOT CHANGED    Details   !! acetaminophen (TYLENOL) 500 mg tablet Take 1 tablet (500 mg total) by mouth every 6 (six) hours as needed for moderate pain, Starting Mon 5/16/2022, Normal      !! acetaminophen (TYLENOL) 500 mg tablet Take 1 tablet (500 mg total) by mouth every 6 (six) hours as needed (pain), Starting Mon 7/4/2022, Normal      !! acetaminophen (TYLENOL) 500 mg tablet Take 1 tablet (500 mg total) by mouth every 6 (six) hours as needed for moderate pain, Starting Sat 9/24/2022, Normal      albuterol (2.5 mg/3 mL) 0.083 % nebulizer solution Inhale 2.5 mg every 6 (six) hours as needed, Starting Thu 12/6/2018, Historical Med      !! albuterol (Proventil HFA) 90 mcg/act inhaler Inhale 1-2 puffs every 6 (six) hours as needed for wheezing or shortness of breath, Starting Thu 8/25/2022, Normal      !! albuterol (PROVENTIL HFA,VENTOLIN HFA) 90 mcg/act inhaler Inhale 2 puffs every 4 (four) hours as needed for wheezing, Starting Sun 3/1/2020, Print      benzocaine (ORAJEL) 10 % mucosal gel Apply 1 application to the mouth or throat as needed for mucositis, Starting Mon 7/4/2022, Normal      calcium carbonate (OS-KARINA) 600 MG tablet Take 600 mg by mouth daily, Historical Med      ketorolac (TORADOL) 10 mg tablet Take 1 tablet (10 mg total) by mouth every 6 (six) hours as needed for moderate pain, Starting Wed 3/9/2022, Normal      loratadine (CLARITIN) 10 mg tablet Take 1 tablet (10 mg total) by mouth daily, Starting Tue 2/18/2020, Normal      metoclopramide (Reglan) 10 mg tablet Take 1 tablet (10 mg total) by mouth every 6 (six) hours, Starting Sat 9/24/2022, Normal      predniSONE 50 mg tablet Take 1 tablet (50 mg total) by mouth daily, Starting Fri 8/26/2022, Normal       !! - Potential duplicate medications found. Please discuss with provider. No discharge procedures on file.     PDMP Review       Value Time User    PDMP Reviewed  Yes 7/5/2022  1:37 PM Nikolay Valencia MD          ED Provider  Electronically Signed by           Faisal Kan PA-C  07/02/23 2693

## 2023-07-02 NOTE — Clinical Note
Jeff Winkler was seen and treated in our emergency department on 7/2/2023. No restrictions            Diagnosis:     Aicha  . She may return on this date: 07/03/2023         If you have any questions or concerns, please don't hesitate to call.       Kellie Altamirano PA-C    ______________________________           _______________          _______________  Hospital Representative                              Date                                Time

## 2023-12-08 ENCOUNTER — HOSPITAL ENCOUNTER (EMERGENCY)
Facility: HOSPITAL | Age: 38
Discharge: HOME | End: 2023-12-08
Attending: EMERGENCY MEDICINE
Payer: MEDICARE

## 2023-12-08 ENCOUNTER — APPOINTMENT (EMERGENCY)
Dept: RADIOLOGY | Facility: HOSPITAL | Age: 38
End: 2023-12-08
Payer: MEDICARE

## 2023-12-08 VITALS
HEART RATE: 85 BPM | DIASTOLIC BLOOD PRESSURE: 68 MMHG | BODY MASS INDEX: 60.66 KG/M2 | SYSTOLIC BLOOD PRESSURE: 104 MMHG | WEIGHT: 293 LBS | TEMPERATURE: 97.9 F | OXYGEN SATURATION: 99 % | RESPIRATION RATE: 20 BRPM

## 2023-12-08 DIAGNOSIS — B34.9 VIRAL SYNDROME: ICD-10-CM

## 2023-12-08 DIAGNOSIS — J45.901 ACUTE ASTHMA EXACERBATION: ICD-10-CM

## 2023-12-08 DIAGNOSIS — J45.901 ASTHMA EXACERBATION: Primary | ICD-10-CM

## 2023-12-08 LAB
FLUAV RNA RESP QL NAA+PROBE: NEGATIVE
FLUBV RNA RESP QL NAA+PROBE: NEGATIVE
RSV RNA RESP QL NAA+PROBE: NEGATIVE
SARS-COV-2 RNA RESP QL NAA+PROBE: NEGATIVE

## 2023-12-08 PROCEDURE — 71045 X-RAY EXAM CHEST 1 VIEW: CPT

## 2023-12-08 PROCEDURE — 99284 EMERGENCY DEPT VISIT MOD MDM: CPT | Performed by: EMERGENCY MEDICINE

## 2023-12-08 PROCEDURE — 94644 CONT INHLJ TX 1ST HOUR: CPT

## 2023-12-08 PROCEDURE — 0241U HB NFCT DS VIR RESP RNA 4 TRGT: CPT | Performed by: EMERGENCY MEDICINE

## 2023-12-08 PROCEDURE — 94760 N-INVAS EAR/PLS OXIMETRY 1: CPT

## 2023-12-08 PROCEDURE — 99285 EMERGENCY DEPT VISIT HI MDM: CPT

## 2023-12-08 RX ORDER — DEXAMETHASONE 4 MG/1
12 TABLET ORAL ONCE
Qty: 3 TABLET | Refills: 0 | Status: SHIPPED | OUTPATIENT
Start: 2023-12-10 | End: 2023-12-10

## 2023-12-08 RX ORDER — ALBUTEROL SULFATE 2.5 MG/3ML
2.5 SOLUTION RESPIRATORY (INHALATION) EVERY 6 HOURS PRN
Qty: 30 ML | Refills: 0 | Status: SHIPPED | OUTPATIENT
Start: 2023-12-08

## 2023-12-08 RX ORDER — ALBUTEROL SULFATE 90 UG/1
1-2 AEROSOL, METERED RESPIRATORY (INHALATION) EVERY 6 HOURS PRN
Qty: 6.7 G | Refills: 0 | Status: SHIPPED | OUTPATIENT
Start: 2023-12-08

## 2023-12-08 RX ORDER — SODIUM CHLORIDE FOR INHALATION 0.9 %
12 VIAL, NEBULIZER (ML) INHALATION ONCE
Status: COMPLETED | OUTPATIENT
Start: 2023-12-08 | End: 2023-12-08

## 2023-12-08 RX ADMIN — Medication 12 ML: at 09:15

## 2023-12-08 RX ADMIN — ALBUTEROL SULFATE 10 MG: 2.5 SOLUTION RESPIRATORY (INHALATION) at 09:15

## 2023-12-08 RX ADMIN — IPRATROPIUM BROMIDE 1 MG: 0.5 SOLUTION RESPIRATORY (INHALATION) at 09:15

## 2023-12-08 RX ADMIN — DEXAMETHASONE SODIUM PHOSPHATE 10 MG: 10 INJECTION, SOLUTION INTRAMUSCULAR; INTRAVENOUS at 09:07

## 2023-12-08 NOTE — ED PROVIDER NOTES
History  Chief Complaint   Patient presents with    Asthma     Since Friday and got extremely worse today. Did 1 hour long nebulizer treatment and Symbicort. 34year old female, hx of asthma, still actively smoking. +viral symptoms, +sick contacts. Feels like bad asthma, gave herself an hour long treatment at home with minimal effect. Asthma  Associated symptoms: cough, shortness of breath and wheezing    Associated symptoms: no abdominal pain, no chest pain, no diarrhea, no fever, no nausea, no rash, no rhinorrhea, no sore throat and no vomiting        Prior to Admission Medications   Prescriptions Last Dose Informant Patient Reported?  Taking?   acetaminophen (TYLENOL) 500 mg tablet   No No   Sig: Take 1 tablet (500 mg total) by mouth every 6 (six) hours as needed for moderate pain   acetaminophen (TYLENOL) 500 mg tablet   No No   Sig: Take 1 tablet (500 mg total) by mouth every 6 (six) hours as needed (pain)   acetaminophen (TYLENOL) 500 mg tablet   No No   Sig: Take 1 tablet (500 mg total) by mouth every 6 (six) hours as needed for moderate pain   albuterol (2.5 mg/3 mL) 0.083 % nebulizer solution   Yes No   Sig: Inhale 2.5 mg every 6 (six) hours as needed   albuterol (2.5 mg/3 mL) 0.083 % nebulizer solution   No Yes   Sig: Take 3 mL (2.5 mg total) by nebulization every 6 (six) hours as needed for wheezing   albuterol (PROVENTIL HFA,VENTOLIN HFA) 90 mcg/act inhaler   No No   Sig: Inhale 2 puffs every 4 (four) hours as needed for wheezing   albuterol (Proventil HFA) 90 mcg/act inhaler   No No   Sig: Inhale 1-2 puffs every 6 (six) hours as needed for wheezing or shortness of breath   albuterol (Proventil HFA) 90 mcg/act inhaler   No Yes   Sig: Inhale 1-2 puffs every 6 (six) hours as needed for wheezing or shortness of breath   benzocaine (ORAJEL) 10 % mucosal gel   No No   Sig: Apply 1 application to the mouth or throat as needed for mucositis   calcium carbonate (OS-KARINA) 600 MG tablet  Self Yes No   Sig: Take 600 mg by mouth daily   ketorolac (TORADOL) 10 mg tablet Not Taking  No No   Sig: Take 1 tablet (10 mg total) by mouth every 6 (six) hours as needed for moderate pain   Patient not taking: Reported on 2023   loratadine (CLARITIN) 10 mg tablet Not Taking  No No   Sig: Take 1 tablet (10 mg total) by mouth daily   Patient not taking: Reported on 2023   metoclopramide (Reglan) 10 mg tablet Not Taking  No No   Sig: Take 1 tablet (10 mg total) by mouth every 6 (six) hours   Patient not taking: Reported on 2023   predniSONE 50 mg tablet Not Taking  No No   Sig: Take 1 tablet (50 mg total) by mouth daily   Patient not taking: Reported on 2023      Facility-Administered Medications: None       Past Medical History:   Diagnosis Date    Asthma     Seasonal allergies        Past Surgical History:   Procedure Laterality Date     SECTION      five     SECTION      CHOLECYSTECTOMY      GASTRECTOMY SLEEVE LAPAROSCOPIC  10/12/2021    TONSILLECTOMY         History reviewed. No pertinent family history. I have reviewed and agree with the history as documented. E-Cigarette/Vaping    E-Cigarette Use Never User      E-Cigarette/Vaping Substances     Social History     Tobacco Use    Smoking status: Every Day     Packs/day: 0.25     Types: Cigarettes    Smokeless tobacco: Never   Vaping Use    Vaping Use: Never used   Substance Use Topics    Alcohol use: Yes     Comment: occassionally    Drug use: Yes     Types: Marijuana     Comment: medical       Review of Systems   Constitutional: Negative. Negative for chills and fever. HENT: Negative. Negative for rhinorrhea, sore throat, trouble swallowing and voice change. Eyes: Negative. Negative for pain and visual disturbance. Respiratory:  Positive for cough, shortness of breath and wheezing. Cardiovascular: Negative. Negative for chest pain and palpitations.    Gastrointestinal:  Negative for abdominal pain, diarrhea, nausea and vomiting. Genitourinary: Negative. Negative for dysuria and frequency. Musculoskeletal: Negative. Negative for neck pain and neck stiffness. Skin: Negative. Negative for rash. Neurological: Negative. Negative for dizziness, speech difficulty, weakness, light-headedness and numbness. Physical Exam  Physical Exam  Vitals and nursing note reviewed. Constitutional:       General: She is not in acute distress. Appearance: She is well-developed. HENT:      Head: Normocephalic and atraumatic. Eyes:      Conjunctiva/sclera: Conjunctivae normal.      Pupils: Pupils are equal, round, and reactive to light. Neck:      Trachea: No tracheal deviation. Cardiovascular:      Rate and Rhythm: Normal rate and regular rhythm. Pulmonary:      Effort: Tachypnea, accessory muscle usage and respiratory distress present. Breath sounds: Examination of the right-upper field reveals wheezing. Examination of the left-upper field reveals wheezing. Examination of the right-lower field reveals wheezing. Examination of the left-lower field reveals wheezing. Wheezing present. No rales. Abdominal:      General: Bowel sounds are normal. There is no distension. Palpations: Abdomen is soft. Tenderness: There is no abdominal tenderness. There is no guarding or rebound. Musculoskeletal:         General: No tenderness or deformity. Normal range of motion. Cervical back: Normal range of motion and neck supple. Skin:     General: Skin is warm and dry. Capillary Refill: Capillary refill takes less than 2 seconds. Findings: No rash. Neurological:      Mental Status: She is alert and oriented to person, place, and time.    Psychiatric:         Behavior: Behavior normal.         Vital Signs  ED Triage Vitals   Temperature Pulse Respirations Blood Pressure SpO2   12/08/23 0854 12/08/23 0854 12/08/23 0854 12/08/23 0854 12/08/23 0854   97.9 °F (36.6 °C) 98 22 134/69 97 %      Temp Source Heart Rate Source Patient Position - Orthostatic VS BP Location FiO2 (%)   12/08/23 0854 12/08/23 0854 12/08/23 0854 12/08/23 0854 --   Tympanic Monitor Sitting Left arm       Pain Score       12/08/23 1011       No Pain           Vitals:    12/08/23 0854 12/08/23 1011   BP: 134/69 104/68   Pulse: 98 85   Patient Position - Orthostatic VS: Sitting Lying         Visual Acuity      ED Medications  Medications   dexamethasone oral liquid 10 mg 1 mL (10 mg Oral Given 12/8/23 6981)   albuterol inhalation solution 10 mg (10 mg Nebulization Given 12/8/23 0915)   ipratropium (ATROVENT) 0.02 % inhalation solution 1 mg (1 mg Nebulization Given 12/8/23 0915)   sodium chloride 0.9 % inhalation solution 12 mL (12 mL Nebulization Given 12/8/23 0915)       Diagnostic Studies  Results Reviewed       Procedure Component Value Units Date/Time    FLU/RSV/COVID - if FLU/RSV clinically relevant [[id number]]  (Normal) Collected: 12/08/23 0909    Lab Status: Final result Specimen: Nares from Nasopharyngeal Swab Updated: 12/08/23 0952     SARS-CoV-2 Negative     INFLUENZA A PCR Negative     INFLUENZA B PCR Negative     RSV PCR Negative    Narrative:      FOR PEDIATRIC PATIENTS - copy/paste COVID Guidelines URL to browser: [email or web address] ashx    SARS-CoV-2 assay is a Nucleic Acid Amplification assay intended for the  qualitative detection of nucleic acid from SARS-CoV-2 in nasopharyngeal  swabs. Results are for the presumptive identification of SARS-CoV-2 RNA. Positive results are indicative of infection with SARS-CoV-2, the virus  causing COVID-19, but do not rule out bacterial infection or co-infection  with other viruses. Laboratories within the [facility] and its  territories are required to report all positive results to the appropriate  public health authorities.  Negative results do not preclude SARS-CoV-2  infection and should not be used as the sole basis for treatment or other  patient management decisions. Negative results must be combined with  clinical observations, patient history, and epidemiological information. This test has not been FDA cleared or approved. This test has been authorized by FDA under an Emergency Use Authorization  (EUA). This test is only authorized for the duration of time the  declaration that circumstances exist justifying the authorization of the  emergency use of an in vitro diagnostic tests for detection of SARS-CoV-2  virus and/or diagnosis of COVID-19 infection under section 564(b)(1) of  the Act, 21 U. S.C. 082NLQ-1(V)(7), unless the authorization is terminated  or revoked sooner. The test has been validated but independent review by FDA  and CLIA is pending. Test performed using [employer]: This RT-PCR assay targets N2,  a region unique to SARS-CoV-2. A conserved region in the E-gene was chosen  for pan-Sarbecovirus detection which includes SARS-CoV-2. According to CMS-2020-01-R, this platform meets the definition of high-throughput technology. XR chest 1 view portable   Final Result by Benjamin Vivas MD (12/08 1623)      No acute cardiopulmonary disease. Workstation performed: [id number]                    Procedures  Procedures         ED Course                               SBIRT 22yo+      Flowsheet Row Most Recent Value   Initial Alcohol Screen: US AUDIT-C     1. How often do you have a drink containing alcohol? 0 Filed at: 12/08/2023 0856   2. How many drinks containing alcohol do you have on a typical day you are drinking? 0 Filed at: 12/08/2023 0856   3a. Male UNDER 65: How often do you have five or more drinks on one occasion? 0 Filed at: 12/08/2023 0856   3b. FEMALE Any Age, or MALE 65+: How often do you have 4 or more drinks on one occassion? 0 Filed at: 12/08/2023 0856   Audit-C Score 0 Filed at: 12/08/2023 4288   DENNY: How many times in the past year have you. ..     Used an illegal drug or used a prescription medication for non-medical reasons? Never Filed at: 12/08/2023 0856                      Medical Decision Making  Diff dx: Viral infection, Asthma exacerbation, Pneumothorax, Bacterial pna. HAART neb completed, patient feels much better, wheezing resolved. Viral testing negative. CXR negative per my interpretation. Refilled patient albuterol meds, continue steroids. Follow up with PCP, strict return precautions reviewed. Amount and/or Complexity of Data Reviewed  Radiology: ordered. Risk  Prescription drug management. Disposition  Final diagnoses:   Asthma exacerbation   Viral syndrome     Time reflects when diagnosis was documented in both MDM as applicable and the Disposition within this note       Time User Action Codes Description Comment    12/8/2023 10:22 AM Geovanna Sancheswer Add [J45.901] Asthma exacerbation     12/8/2023 10:22 AM Geovanna Hartman Add [B34.9] Viral syndrome     12/8/2023 10:23 AM Geovannarenetta Sancheswer Add [J45.901] Acute asthma exacerbation           ED Disposition       ED Disposition   Discharge    Condition   Stable    Date/Time   Fri Dec 8, 2023 [address] discharge to home/self care.                    Follow-up Information       Follow up With Specialties Details Why Contact Info Additional Information    [facility], [address] [facility] [address]  [address]  [facility] [address]  [phone]       [facility] [place] [facility] Schedule an appointment as soon as possible for a visit   [address] [facility] [phone] [address] [facility], [address] [place], [place], [address]            Discharge Medication List as of 12/8/2023 10:24 AM        START taking these medications    Details   dexamethasone (DECADRON) 4 mg tablet Take 3 tablets (12 mg total) by mouth 1 (one) time for 1 dose Do not start before December 10, 2023., Starting Sun 12/10/2023, Normal           CONTINUE these medications which have CHANGED    Details   albuterol (2.5 mg/3 mL) 0.083 % nebulizer solution Take 3 mL (2.5 mg total) by nebulization every 6 (six) hours as needed for wheezing, Starting Fri 12/8/2023, Normal      !! albuterol (Proventil HFA) 90 mcg/act inhaler Inhale 1-2 puffs every 6 (six) hours as needed for wheezing or shortness of breath, Starting Fri 12/8/2023, Normal       !! - Potential duplicate medications found. Please discuss with provider.         CONTINUE these medications which have NOT CHANGED    Details   !! acetaminophen (TYLENOL) 500 mg tablet Take 1 tablet (500 mg total) by mouth every 6 (six) hours as needed for moderate pain, Starting Mon 5/16/2022, Normal      !! acetaminophen (TYLENOL) 500 mg tablet Take 1 tablet (500 mg total) by mouth every 6 (six) hours as needed (pain), Starting Mon 7/4/2022, Normal      !! acetaminophen (TYLENOL) 500 mg tablet Take 1 tablet (500 mg total) by mouth every 6 (six) hours as needed for moderate pain, Starting Sat 9/24/2022, Normal      !! albuterol (PROVENTIL HFA,VENTOLIN HFA) 90 mcg/act inhaler Inhale 2 puffs every 4 (four) hours as needed for wheezing, Starting Sun 3/1/2020, Print      benzocaine (ORAJEL) 10 % mucosal gel Apply 1 application to the mouth or throat as needed for mucositis, Starting Mon 7/4/2022, Normal      calcium carbonate (OS-KARINA) 600 MG tablet Take 600 mg by mouth daily, Historical Med      ketorolac (TORADOL) 10 mg tablet Take 1 tablet (10 mg total) by mouth every 6 (six) hours as needed for moderate pain, Starting Wed 3/9/2022, Normal      loratadine (CLARITIN) 10 mg tablet Take 1 tablet (10 mg total) by mouth daily, Starting Tue 2/18/2020, Normal      metoclopramide (Reglan) 10 mg tablet Take 1 tablet (10 mg total) by mouth every 6 (six) hours, Starting Sat 9/24/2022, Normal      predniSONE 50 mg tablet Take 1 tablet (50 mg total) by mouth daily, Starting Fri 8/26/2022, Normal       !! - Potential duplicate medications found. Please discuss with provider. No discharge procedures on file.     PDMP Review         Value Time User    PDMP Reviewed  Yes 7/5/2022  1:37 PM Loree Kimbrough MD            ED Provider  Electronically Signed by             Mery Washington DO  12/08/23 1928

## 2023-12-08 NOTE — Clinical Note
Kiran Kemp was seen and treated in our emergency department on 12/8/2023. Diagnosis:     Ashtyn Lieberman  . She may return on this date: 12/10/2023         If you have any questions or concerns, please don't hesitate to call.       Sophie Del Castillo, DO    ______________________________           _______________          _______________  Hospital Representative                              Date                                Time

## 2024-02-23 ENCOUNTER — HOSPITAL ENCOUNTER (EMERGENCY)
Facility: HOSPITAL | Age: 39
Discharge: HOME | End: 2024-02-23
Attending: EMERGENCY MEDICINE
Payer: MEDICARE

## 2024-02-23 VITALS
RESPIRATION RATE: 21 BRPM | TEMPERATURE: 97.6 F | SYSTOLIC BLOOD PRESSURE: 121 MMHG | OXYGEN SATURATION: 92 % | HEART RATE: 87 BPM | WEIGHT: 293 LBS | BODY MASS INDEX: 57.52 KG/M2 | HEIGHT: 60 IN | DIASTOLIC BLOOD PRESSURE: 64 MMHG

## 2024-02-23 DIAGNOSIS — Z20.822 CLOSE EXPOSURE TO COVID-19 VIRUS: ICD-10-CM

## 2024-02-23 DIAGNOSIS — B34.9 VIRAL SYNDROME: Primary | ICD-10-CM

## 2024-02-23 LAB
EXT PREGNANCY TEST URINE: NEGATIVE
EXT. CONTROL: NORMAL
FLUAV RNA RESP QL NAA+PROBE: NEGATIVE
FLUBV RNA RESP QL NAA+PROBE: NEGATIVE
RSV RNA RESP QL NAA+PROBE: NEGATIVE
SARS-COV-2 RNA RESP QL NAA+PROBE: POSITIVE

## 2024-02-23 PROCEDURE — 99283 EMERGENCY DEPT VISIT LOW MDM: CPT

## 2024-02-23 PROCEDURE — 99284 EMERGENCY DEPT VISIT MOD MDM: CPT

## 2024-02-23 PROCEDURE — 81025 URINE PREGNANCY TEST: CPT

## 2024-02-23 PROCEDURE — 0241U HB NFCT DS VIR RESP RNA 4 TRGT: CPT

## 2024-02-23 PROCEDURE — 96372 THER/PROPH/DIAG INJ SC/IM: CPT

## 2024-02-23 RX ORDER — LOPERAMIDE HYDROCHLORIDE 2 MG/1
2 CAPSULE ORAL 4 TIMES DAILY PRN
Qty: 12 CAPSULE | Refills: 0 | Status: SHIPPED | OUTPATIENT
Start: 2024-02-23

## 2024-02-23 RX ORDER — IBUPROFEN 800 MG/1
800 TABLET ORAL EVERY 8 HOURS SCHEDULED
Qty: 21 TABLET | Refills: 0 | Status: SHIPPED | OUTPATIENT
Start: 2024-02-23

## 2024-02-23 RX ORDER — KETOROLAC TROMETHAMINE 30 MG/ML
15 INJECTION, SOLUTION INTRAMUSCULAR; INTRAVENOUS ONCE
Status: COMPLETED | OUTPATIENT
Start: 2024-02-23 | End: 2024-02-23

## 2024-02-23 RX ORDER — ALBUTEROL SULFATE 90 UG/1
2 AEROSOL, METERED RESPIRATORY (INHALATION) EVERY 6 HOURS PRN
Qty: 8.5 G | Refills: 0 | Status: SHIPPED | OUTPATIENT
Start: 2024-02-23

## 2024-02-23 RX ORDER — FLUTICASONE PROPIONATE 50 MCG
2 SPRAY, SUSPENSION (ML) NASAL DAILY
Qty: 11.1 ML | Refills: 0 | Status: SHIPPED | OUTPATIENT
Start: 2024-02-23 | End: 2024-03-04

## 2024-02-23 RX ADMIN — KETOROLAC TROMETHAMINE 15 MG: 30 INJECTION, SOLUTION INTRAMUSCULAR; INTRAVENOUS at 21:13

## 2024-02-23 NOTE — Clinical Note
Aicha Vasquez was seen and treated in our emergency department on 2/23/2024.                Diagnosis:     Aicha  may return to work on return date.    She may return on this date: 02/28/2024         If you have any questions or concerns, please don't hesitate to call.      Ting Rodriguez PA-C    ______________________________           _______________          _______________  Hospital Representative                              Date                                Time

## 2024-02-24 NOTE — ED NOTES
Pt given urine specimen cup to collect sample. Pt verbalized understanding. Curtain closed for pt privacy.      Marianne Abreu RN  02/23/24 [id number]

## 2024-02-24 NOTE — ED PROVIDER NOTES
"History  Chief Complaint   Patient presents with    COVID-19 Exposure     Pt presents to the ED with c/o headache, stuffy nose, back pain and sore throat. States that she was here today with a client that tested positive for covid and she thinks that she has covid.     The patient is a 38-year-old female with a past medical history of asthma and seasonal allergies, who presents for evaluation of cold-like symptoms.  She reports several hours of headache, generalized myalgias, eye \"burning\", congestion, rhinorrhea, sore throat, chills, and 1 episode of diarrhea.  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  The patient performed a home COVID test earlier which was positive.  No medications taken PTA.        Prior to Admission Medications   Prescriptions Last Dose Informant Patient Reported? Taking?   acetaminophen (TYLENOL) 500 mg tablet   No No   Sig: Take 1 tablet (500 mg total) by mouth every 6 (six) hours as needed for moderate pain   acetaminophen (TYLENOL) 500 mg tablet   No No   Sig: Take 1 tablet (500 mg total) by mouth every 6 (six) hours as needed (pain)   acetaminophen (TYLENOL) 500 mg tablet   No No   Sig: Take 1 tablet (500 mg total) by mouth every 6 (six) hours as needed for moderate pain   albuterol (2.5 mg/3 mL) 0.083 % nebulizer solution   No No   Sig: Take 3 mL (2.5 mg total) by nebulization every 6 (six) hours as needed for wheezing   albuterol (PROVENTIL HFA,VENTOLIN HFA) 90 mcg/act inhaler   No No   Sig: Inhale 2 puffs every 4 (four) hours as needed for wheezing   albuterol (Proventil HFA) 90 mcg/act inhaler   No No   Sig: Inhale 1-2 puffs every 6 (six) hours as needed for wheezing or shortness of breath   benzocaine (ORAJEL) 10 % mucosal gel   No No   Sig: Apply 1 application to the mouth or throat as needed for mucositis   calcium carbonate (OS-KARINA) 600 MG tablet  Self Yes No   Sig: Take 600 mg by mouth daily   ketorolac " (TORADOL) 10 mg tablet   No No   Sig: Take 1 tablet (10 mg total) by mouth every 6 (six) hours as needed for moderate pain   Patient not taking: Reported on 2023   loratadine (CLARITIN) 10 mg tablet   No No   Sig: Take 1 tablet (10 mg total) by mouth daily   Patient not taking: Reported on 2023   metoclopramide (Reglan) 10 mg tablet   No No   Sig: Take 1 tablet (10 mg total) by mouth every 6 (six) hours   Patient not taking: Reported on 2023   predniSONE 50 mg tablet   No No   Sig: Take 1 tablet (50 mg total) by mouth daily   Patient not taking: Reported on 2023      Facility-Administered Medications: None       Past Medical History:   Diagnosis Date    Asthma     Seasonal allergies        Past Surgical History:   Procedure Laterality Date     SECTION      five     SECTION      CHOLECYSTECTOMY      GASTRECTOMY SLEEVE LAPAROSCOPIC  10/12/2021    TONSILLECTOMY         History reviewed. No pertinent family history.  I have reviewed and agree with the history as documented.    E-Cigarette/Vaping    E-Cigarette Use Never User      E-Cigarette/Vaping Substances     Social History     Tobacco Use    Smoking status: Every Day     Current packs/day: 0.25     Types: Cigarettes    Smokeless tobacco: Never   Vaping Use    Vaping status: Never Used   Substance Use Topics    Alcohol use: Yes     Comment: occassionally    Drug use: Yes     Types: Marijuana     Comment: medical       Review of Systems   Constitutional:  Positive for chills. Negative for fever.   HENT:  Positive for congestion, rhinorrhea and sore throat. Negative for ear pain.    Eyes:  Positive for itching. Negative for pain and visual disturbance.   Respiratory:  Negative for cough and shortness of breath.    Cardiovascular:  Negative for chest pain and palpitations.   Gastrointestinal:  Positive for diarrhea. Negative for abdominal pain, nausea and vomiting.   Genitourinary:  Negative for dysuria and hematuria.    Musculoskeletal:  Positive for myalgias. Negative for arthralgias and back pain.   Skin:  Negative for color change and rash.   Neurological:  Positive for headaches. Negative for seizures and syncope.   All other systems reviewed and are negative.      Physical Exam  Physical Exam  Vitals and nursing note reviewed.   Constitutional:       General: She is awake. She is not in acute distress.     Appearance: She is well-developed and well-groomed. She is morbidly obese. She is not toxic-appearing or diaphoretic.   HENT:      Head: Normocephalic and atraumatic.      Right Ear: Tympanic membrane, ear canal and external ear normal.      Left Ear: Tympanic membrane, ear canal and external ear normal.      Nose: Congestion and rhinorrhea present. Rhinorrhea is clear.      Mouth/Throat:      Lips: Pink.      Mouth: Mucous membranes are moist. No oral lesions.      Pharynx: Oropharynx is clear. Uvula midline. Posterior oropharyngeal erythema present. No pharyngeal swelling or oropharyngeal exudate.   Eyes:      General: Lids are normal. Vision grossly intact. Gaze aligned appropriately. No visual field deficit or scleral icterus.     Conjunctiva/sclera: Conjunctivae normal.      Right eye: Right conjunctiva is not injected. No exudate.     Left eye: Left conjunctiva is not injected. No exudate.     Pupils: Pupils are equal, round, and reactive to light.      Comments: Watery discharge from both eyes   Cardiovascular:      Rate and Rhythm: Normal rate and regular rhythm.      Heart sounds: S1 normal and S2 normal. No murmur heard.  Pulmonary:      Effort: Pulmonary effort is normal. No respiratory distress.      Breath sounds: Normal breath sounds and air entry. No stridor. No wheezing, rhonchi or rales.   Abdominal:      General: Abdomen is flat.      Palpations: Abdomen is soft.      Tenderness: There is no abdominal tenderness. There is no guarding or rebound.   Musculoskeletal:      Cervical back: Normal, full passive  range of motion without pain and neck supple. No rigidity or crepitus. No spinous process tenderness or muscular tenderness.      Thoracic back: Normal.      Lumbar back: Normal.   Lymphadenopathy:      Head:      Right side of head: No submental, submandibular, tonsillar, preauricular or posterior auricular adenopathy.      Left side of head: No submental, submandibular, tonsillar, preauricular or posterior auricular adenopathy.      Cervical: No cervical adenopathy.   Skin:     General: Skin is warm.      Capillary Refill: Capillary refill takes less than 2 seconds.      Coloration: Skin is not pale.      Findings: No rash.   Neurological:      Mental Status: She is alert and oriented to person, place, and time.   Psychiatric:         Behavior: Behavior is cooperative.         Vital Signs  ED Triage Vitals [02/23/24 2001]   Temperature Pulse Respirations Blood Pressure SpO2   97.6 °F (36.4 °C) 87 21 121/64 92 %      Temp Source Heart Rate Source Patient Position - Orthostatic VS BP Location FiO2 (%)   Tympanic -- Sitting Left arm --      Pain Score       --           Vitals:    02/23/24 2001   BP: 121/64   Pulse: 87   Patient Position - Orthostatic VS: Sitting         Visual Acuity      ED Medications  Medications   ketorolac (TORADOL) injection 15 mg (has no administration in time range)       Diagnostic Studies  Results Reviewed       Procedure Component Value Units Date/Time    POCT pregnancy, urine [[id number]]     Lab Status: No result     FLU/RSV/COVID - if FLU/RSV clinically relevant [[id number]]     Lab Status: No result Specimen: Nares from Nose                    No orders to display              Procedures  Procedures         ED Course           Medical Decision Making  The patient presents for cold-like symptoms and a positive home covid test.  She is ill-appearing, but afebrile and hemodynamically stable. Differential diagnosis includes but is not limited to viral illness, URI, sinusitis, allergic  rhinitis, strep pharyngitis, bacterial tonsillitis, gastritis, or gastroenteritis.  Viral swab obtained and is pending.  Patient will be contacted with any positive result.  As she did have a positive home test, patient was provided with a note for work for the 5 day isolation period.  Patient is in agreement with the treatment plan and all questions were answered.  Strict return precautions discussed and she verbalized understanding.  Follow up with PCP, but return to the ED in the interim with new or worsening symptoms.    Problems Addressed:  Close exposure to COVID-19 virus: acute illness or injury  Viral syndrome: acute illness or injury    Amount and/or Complexity of Data Reviewed  Labs: ordered.    Risk  OTC drugs.  Prescription drug management.           Disposition  Final diagnoses:   Viral syndrome   Close exposure to COVID-19 virus     Time reflects when diagnosis was documented in both MDM as applicable and the Disposition within this note       Time User Action Codes Description Comment    2/23/2024  9:08 PM Ting Rodriguez [B34.9] Viral syndrome     2/23/2024  9:08 PM Ting Rodriguez Add [Z20.822] Close exposure to COVID-19 virus           ED Disposition       ED Disposition   Discharge    Condition   Stable    Date/Time   Fri Feb 23, 2024  9:15 PM    Comment   Aicha Pedro discharge to home/self care.                   Follow-up Information       Follow up With Specialties Details Why Contact Info    Oscar Shepherd MD Family Medicine   [facility],  [address]  [address]  [facility] [id number] [phone]              Discharge Medication List as of 2/23/2024  9:15 PM        START taking these medications    Details   !! albuterol (ProAir HFA) 90 mcg/act inhaler Inhale 2 puffs every 6 (six) hours as needed for wheezing, Starting Fri 2/23/2024, Normal      fluticasone (FLONASE) 50 mcg/act nasal spray 2 sprays into each nostril daily for 10 days, Starting Fri 2/23/2024, Until Mon 3/4/2024,  Normal      ibuprofen (MOTRIN) 800 mg tablet Take 1 tablet (800 mg total) by mouth every 8 (eight) hours, Starting Fri 2/23/2024, Normal      loperamide (IMODIUM) 2 mg capsule Take 1 capsule (2 mg total) by mouth 4 (four) times a day as needed for diarrhea, Starting Fri 2/23/2024, Normal      menthol-cetylpyridinium (CEPACOL) 3 MG lozenge Take 1 lozenge (3 mg total) by mouth as needed for sore throat, Starting Fri 2/23/2024, Normal       !! - Potential duplicate medications found. Please discuss with provider.        CONTINUE these medications which have NOT CHANGED    Details   !! acetaminophen (TYLENOL) 500 mg tablet Take 1 tablet (500 mg total) by mouth every 6 (six) hours as needed for moderate pain, Starting Mon 5/16/2022, Normal      !! acetaminophen (TYLENOL) 500 mg tablet Take 1 tablet (500 mg total) by mouth every 6 (six) hours as needed (pain), Starting Mon 7/4/2022, Normal      !! acetaminophen (TYLENOL) 500 mg tablet Take 1 tablet (500 mg total) by mouth every 6 (six) hours as needed for moderate pain, Starting Sat 9/24/2022, Normal      albuterol (2.5 mg/3 mL) 0.083 % nebulizer solution Take 3 mL (2.5 mg total) by nebulization every 6 (six) hours as needed for wheezing, Starting Fri 12/8/2023, Normal      !! albuterol (Proventil HFA) 90 mcg/act inhaler Inhale 1-2 puffs every 6 (six) hours as needed for wheezing or shortness of breath, Starting Fri 12/8/2023, Normal      !! albuterol (PROVENTIL HFA,VENTOLIN HFA) 90 mcg/act inhaler Inhale 2 puffs every 4 (four) hours as needed for wheezing, Starting Sun 3/1/2020, Print      benzocaine (ORAJEL) 10 % mucosal gel Apply 1 application to the mouth or throat as needed for mucositis, Starting Mon 7/4/2022, Normal      calcium carbonate (OS-KARINA) 600 MG tablet Take 600 mg by mouth daily, Historical Med      ketorolac (TORADOL) 10 mg tablet Take 1 tablet (10 mg total) by mouth every 6 (six) hours as needed for moderate pain, Starting Wed 3/9/2022, Normal       loratadine (CLARITIN) 10 mg tablet Take 1 tablet (10 mg total) by mouth daily, Starting Tue 2/18/2020, Normal      metoclopramide (Reglan) 10 mg tablet Take 1 tablet (10 mg total) by mouth every 6 (six) hours, Starting Sat 9/24/2022, Normal      predniSONE 50 mg tablet Take 1 tablet (50 mg total) by mouth daily, Starting Fri 8/26/2022, Normal       !! - Potential duplicate medications found. Please discuss with provider.          No discharge procedures on file.    PDMP Review         Value Time User    PDMP Reviewed  Yes 7/5/2022  1:37 PM Malu Wheeler MD            ED Provider  Electronically Signed by             Ting Rodriguez PA-C  02/24/24 0575

## 2024-06-30 ENCOUNTER — HOSPITAL ENCOUNTER (EMERGENCY)
Facility: HOSPITAL | Age: 39
Discharge: HOME | End: 2024-06-30
Attending: EMERGENCY MEDICINE
Payer: MEDICARE

## 2024-06-30 VITALS
SYSTOLIC BLOOD PRESSURE: 115 MMHG | HEART RATE: 86 BPM | BODY MASS INDEX: 64.84 KG/M2 | TEMPERATURE: 98.5 F | RESPIRATION RATE: 18 BRPM | DIASTOLIC BLOOD PRESSURE: 69 MMHG | WEIGHT: 293 LBS | OXYGEN SATURATION: 98 %

## 2024-06-30 DIAGNOSIS — S61.012A LACERATION OF LEFT THUMB: Primary | ICD-10-CM

## 2024-06-30 PROCEDURE — 99282 EMERGENCY DEPT VISIT SF MDM: CPT

## 2024-06-30 PROCEDURE — 90471 IMMUNIZATION ADMIN: CPT

## 2024-06-30 PROCEDURE — 99284 EMERGENCY DEPT VISIT MOD MDM: CPT

## 2024-06-30 PROCEDURE — 90715 TDAP VACCINE 7 YRS/> IM: CPT

## 2024-06-30 PROCEDURE — 12001 RPR S/N/AX/GEN/TRNK 2.5CM/<: CPT

## 2024-06-30 RX ADMIN — TETANUS TOXOID, REDUCED DIPHTHERIA TOXOID AND ACELLULAR PERTUSSIS VACCINE, ADSORBED 0.5 ML: 5; 2.5; 8; 8; 2.5 SUSPENSION INTRAMUSCULAR at 20:46

## 2024-07-01 NOTE — ED PROVIDER NOTES
History  Chief Complaint   Patient presents with    Hand Laceration     Left hand index finger laceration from a knife when attempting to unscrew a screw with it. Up to date with tetanus.      The patient is a 39-year-old, right hand dominant, female with a past medical history of asthma and seasonal allergies, who presents for evaluation of a left thumb laceration.  Shortly prior to arrival the patient was using a knife to unscrew a small screw on her grandsons toy when she accidentally cut her left finger.  She experienced a significant amount of bleeding from the area, however bleeding was controlled prior to arrival.  No decreased range of motion or loss of sensation.  Last tetanus was 6/1/19.        Prior to Admission Medications   Prescriptions Last Dose Informant Patient Reported? Taking?   acetaminophen (TYLENOL) 500 mg tablet   No No   Sig: Take 1 tablet (500 mg total) by mouth every 6 (six) hours as needed for moderate pain   acetaminophen (TYLENOL) 500 mg tablet   No No   Sig: Take 1 tablet (500 mg total) by mouth every 6 (six) hours as needed (pain)   acetaminophen (TYLENOL) 500 mg tablet   No No   Sig: Take 1 tablet (500 mg total) by mouth every 6 (six) hours as needed for moderate pain   albuterol (2.5 mg/3 mL) 0.083 % nebulizer solution   No No   Sig: Take 3 mL (2.5 mg total) by nebulization every 6 (six) hours as needed for wheezing   albuterol (PROVENTIL HFA,VENTOLIN HFA) 90 mcg/act inhaler   No No   Sig: Inhale 2 puffs every 4 (four) hours as needed for wheezing   albuterol (ProAir HFA) 90 mcg/act inhaler   No No   Sig: Inhale 2 puffs every 6 (six) hours as needed for wheezing   albuterol (Proventil HFA) 90 mcg/act inhaler   No No   Sig: Inhale 1-2 puffs every 6 (six) hours as needed for wheezing or shortness of breath   benzocaine (ORAJEL) 10 % mucosal gel   No No   Sig: Apply 1 application to the mouth or throat as needed for mucositis   calcium carbonate (OS-KARINA) 600 MG tablet  Self Yes No    Sig: Take 600 mg by mouth daily   fluticasone (FLONASE) 50 mcg/act nasal spray   No No   Si sprays into each nostril daily for 10 days   ibuprofen (MOTRIN) 800 mg tablet   No No   Sig: Take 1 tablet (800 mg total) by mouth every 8 (eight) hours   ketorolac (TORADOL) 10 mg tablet   No No   Sig: Take 1 tablet (10 mg total) by mouth every 6 (six) hours as needed for moderate pain   Patient not taking: Reported on 2023   loperamide (IMODIUM) 2 mg capsule   No No   Sig: Take 1 capsule (2 mg total) by mouth 4 (four) times a day as needed for diarrhea   loratadine (CLARITIN) 10 mg tablet   No No   Sig: Take 1 tablet (10 mg total) by mouth daily   Patient not taking: Reported on 2023   menthol-cetylpyridinium (CEPACOL) 3 MG lozenge   No No   Sig: Take 1 lozenge (3 mg total) by mouth as needed for sore throat   metoclopramide (Reglan) 10 mg tablet   No No   Sig: Take 1 tablet (10 mg total) by mouth every 6 (six) hours   Patient not taking: Reported on 2023   predniSONE 50 mg tablet   No No   Sig: Take 1 tablet (50 mg total) by mouth daily   Patient not taking: Reported on 2023      Facility-Administered Medications: None       Past Medical History:   Diagnosis Date    Asthma     Seasonal allergies        Past Surgical History:   Procedure Laterality Date     SECTION      five     SECTION      CHOLECYSTECTOMY      GASTRECTOMY SLEEVE LAPAROSCOPIC  10/12/2021    TONSILLECTOMY         History reviewed. No pertinent family history.  I have reviewed and agree with the history as documented.    E-Cigarette/Vaping    E-Cigarette Use Never User      E-Cigarette/Vaping Substances     Social History     Tobacco Use    Smoking status: Every Day     Current packs/day: 0.25     Types: Cigarettes    Smokeless tobacco: Never   Vaping Use    Vaping status: Never Used   Substance Use Topics    Alcohol use: Yes     Comment: occassionally    Drug use: Yes     Types: Marijuana     Comment: medical        Review of Systems   Constitutional:  Negative for chills and fever.   HENT:  Negative for ear pain, rhinorrhea and sore throat.    Eyes:  Negative for pain and visual disturbance.   Respiratory:  Negative for cough and shortness of breath.    Cardiovascular:  Negative for chest pain and palpitations.   Gastrointestinal:  Negative for abdominal pain and vomiting.   Genitourinary:  Negative for dysuria and hematuria.   Musculoskeletal:  Negative for arthralgias, back pain and myalgias.   Skin:  Positive for wound. Negative for color change and rash.   Neurological:  Negative for seizures, syncope, weakness and numbness.   All other systems reviewed and are negative.      Physical Exam  Physical Exam  Vitals and nursing note reviewed.   Constitutional:       General: She is awake. She is not in acute distress.     Appearance: Normal appearance. She is well-developed.   HENT:      Head: Normocephalic and atraumatic.      Right Ear: External ear normal.      Left Ear: External ear normal.      Nose: Nose normal.      Mouth/Throat:      Lips: Pink.      Mouth: Mucous membranes are moist.   Eyes:      General: Lids are normal. Vision grossly intact. Gaze aligned appropriately.      Conjunctiva/sclera: Conjunctivae normal.      Pupils: Pupils are equal, round, and reactive to light.   Cardiovascular:      Rate and Rhythm: Normal rate and regular rhythm.   Pulmonary:      Effort: Pulmonary effort is normal. No respiratory distress.   Musculoskeletal:      Cervical back: Normal, full passive range of motion without pain and neck supple.      Thoracic back: Normal.      Lumbar back: Normal.      Comments: Full AROM of the left hand including thumb opposition.  Normal  strength.  Sensation is intact in all fingers.  2+ radial pulse and normal capillary refill.   Skin:     General: Skin is warm.      Capillary Refill: Capillary refill takes less than 2 seconds.      Coloration: Skin is not pale.      Findings:  "Laceration present. No rash.      Comments: ~ 1 cm linear laceration to the dorsal left thumb overlying the interphalangeal joint.  The wound edges are well approximated when the thumb is held in extension, however the edges do gap when the thumb is held in flexion.  No foreign bodies present.  Bleeding controlled PTA.   Neurological:      Mental Status: She is alert and oriented to person, place, and time.   Psychiatric:         Attention and Perception: Attention normal.         Mood and Affect: Mood normal.         Speech: Speech normal.         Behavior: Behavior normal. Behavior is cooperative.         Vital Signs  ED Triage Vitals   Temperature Pulse Respirations Blood Pressure SpO2   06/30/24 2023 06/30/24 2023 06/30/24 2023 06/30/24 2024 06/30/24 2023   98.5 °F (36.9 °C) 86 18 115/69 98 %      Temp Source Heart Rate Source Patient Position - Orthostatic VS BP Location FiO2 (%)   06/30/24 2023 06/30/24 2023 06/30/24 2023 06/30/24 2023 --   Tympanic Monitor Lying Left arm       Pain Score       --                  Vitals:    06/30/24 2023 06/30/24 2024   BP:  115/69   Pulse: 86    Patient Position - Orthostatic VS: Lying Sitting       ED Medications  Medications   tetanus-diphtheria-acellular pertussis (BOOSTRIX) IM injection 0.5 mL (0.5 mL Intramuscular Given 6/30/24 2046)       Diagnostic Studies  Results Reviewed       None                   No orders to display              Procedures  Universal Protocol:  Consent: Verbal consent obtained.  Risks and benefits: risks, benefits and alternatives were discussed  Consent given by: patient  Time out: Immediately prior to procedure a \"time out\" was called to verify the correct patient, procedure, equipment, support staff and site/side marked as required.  Timeout called at: 6/30/2024 8:59 PM.  Patient understanding: patient states understanding of the procedure being performed  Patient consent: the patient's understanding of the procedure matches consent " given  Procedure consent: procedure consent matches procedure scheduled  Required items: required blood products, implants, devices, and special equipment available  Patient identity confirmed: verbally with patient and arm band  Laceration repair    Date/Time: 6/30/2024 8:59 PM    Performed by: Ting Rodriguez PA-C  Authorized by: Ting Rodriguez PA-C  Body area: upper extremity  Location details: left thumb  Laceration length: 1 cm  Tendon involvement: none  Nerve involvement: none  Vascular damage: no    Sedation:  Patient sedated: no      Wound Dehiscence:  Superficial Wound Dehiscence: simple closure      Procedure Details:  Preparation: Patient was prepped and draped in the usual sterile fashion.  Irrigation solution: saline  Irrigation method: jet lavage  Amount of cleaning: standard  Skin closure: Steri-Strips and glue  Dressing: splint  Patient tolerance: patient tolerated the procedure well with no immediate complications               ED Course         Medical Decision Making  Patient presents with a left thumb laceration.  The finger is neurovascularly intact.  The laceration was thoroughly irrigated with soap, water, and normal saline.  Discussed R/B/A of closure with sutures vs skin glue reinforced with Steri-Strips as the laceration does lay over a joint.  The laceration was repaired with a combination of skin glue and Steri-strips after which the thumb was placed in an aluminum finger splint to prevent flexion at the IP joint.  Tetanus was updated.  Wound care reviewed and the patient's questions were answered.  Strict return precautions discussed and she verbalized understanding.  Follow-up with PCP, but return to the ED in the interim with new or worsening symptoms.     Problems Addressed:  Laceration of left thumb: acute illness or injury    Risk  Prescription drug management.             Disposition  Final diagnoses:   Laceration of left thumb     Time reflects when diagnosis was  documented in both MDM as applicable and the Disposition within this note       Time User Action Codes Description Comment    6/30/2024  8:37 PM Ting Rodriguez Add [S61.012A] Laceration of left thumb           ED Disposition       ED Disposition   Discharge    Condition   Stable    Date/Time   Sun Jun 30, 2024  8:39 PM    Comment   Aicha Vasquez discharge to home/self care.                   Follow-up Information       Follow up With Specialties Details Why Contact Info    Oscar Shepherd MD Family Medicine   [address] [facility],  [address]  [address]  [facility] [address] [phone]              Discharge Medication List as of 6/30/2024  8:50 PM        CONTINUE these medications which have NOT CHANGED    Details   !! acetaminophen (TYLENOL) 500 mg tablet Take 1 tablet (500 mg total) by mouth every 6 (six) hours as needed for moderate pain, Starting Mon 5/16/2022, Normal      !! acetaminophen (TYLENOL) 500 mg tablet Take 1 tablet (500 mg total) by mouth every 6 (six) hours as needed (pain), Starting Mon 7/4/2022, Normal      !! acetaminophen (TYLENOL) 500 mg tablet Take 1 tablet (500 mg total) by mouth every 6 (six) hours as needed for moderate pain, Starting Sat 9/24/2022, Normal      albuterol (2.5 mg/3 mL) 0.083 % nebulizer solution Take 3 mL (2.5 mg total) by nebulization every 6 (six) hours as needed for wheezing, Starting Fri 12/8/2023, Normal      !! albuterol (ProAir HFA) 90 mcg/act inhaler Inhale 2 puffs every 6 (six) hours as needed for wheezing, Starting Fri 2/23/2024, Normal      !! albuterol (Proventil HFA) 90 mcg/act inhaler Inhale 1-2 puffs every 6 (six) hours as needed for wheezing or shortness of breath, Starting Fri 12/8/2023, Normal      !! albuterol (PROVENTIL HFA,VENTOLIN HFA) 90 mcg/act inhaler Inhale 2 puffs every 4 (four) hours as needed for wheezing, Starting Sun 3/1/2020, Print      benzocaine (ORAJEL) 10 % mucosal gel Apply 1 application to the mouth or throat as needed for mucositis,  Starting Mon 7/4/2022, Normal      calcium carbonate (OS-KARINA) 600 MG tablet Take 600 mg by mouth daily, Historical Med      fluticasone (FLONASE) 50 mcg/act nasal spray 2 sprays into each nostril daily for 10 days, Starting Fri 2/23/2024, Until Mon 3/4/2024, Normal      ibuprofen (MOTRIN) 800 mg tablet Take 1 tablet (800 mg total) by mouth every 8 (eight) hours, Starting Fri 2/23/2024, Normal      ketorolac (TORADOL) 10 mg tablet Take 1 tablet (10 mg total) by mouth every 6 (six) hours as needed for moderate pain, Starting Wed 3/9/2022, Normal      loperamide (IMODIUM) 2 mg capsule Take 1 capsule (2 mg total) by mouth 4 (four) times a day as needed for diarrhea, Starting Fri 2/23/2024, Normal      loratadine (CLARITIN) 10 mg tablet Take 1 tablet (10 mg total) by mouth daily, Starting Tue 2/18/2020, Normal      menthol-cetylpyridinium (CEPACOL) 3 MG lozenge Take 1 lozenge (3 mg total) by mouth as needed for sore throat, Starting Fri 2/23/2024, Normal      metoclopramide (Reglan) 10 mg tablet Take 1 tablet (10 mg total) by mouth every 6 (six) hours, Starting Sat 9/24/2022, Normal      predniSONE 50 mg tablet Take 1 tablet (50 mg total) by mouth daily, Starting Fri 8/26/2022, Normal       !! - Potential duplicate medications found. Please discuss with provider.          No discharge procedures on file.    PDMP Review         Value Time User    PDMP Reviewed  Yes 7/5/2022  1:37 PM Malu Wheeler MD            ED Provider  Electronically Signed by             Ting Rodriguez PA-C  06/30/24 2105       Ting Rodriguez PA-C  06/30/24 2106

## 2024-07-22 ENCOUNTER — HOSPITAL ENCOUNTER (EMERGENCY)
Facility: HOSPITAL | Age: 39
Discharge: HOME | End: 2024-07-22
Attending: EMERGENCY MEDICINE
Payer: MEDICARE

## 2024-07-22 ENCOUNTER — APPOINTMENT (EMERGENCY)
Dept: RADIOLOGY | Facility: HOSPITAL | Age: 39
End: 2024-07-22
Payer: MEDICARE

## 2024-07-22 VITALS
HEART RATE: 66 BPM | BODY MASS INDEX: 64.71 KG/M2 | DIASTOLIC BLOOD PRESSURE: 62 MMHG | TEMPERATURE: 98.3 F | SYSTOLIC BLOOD PRESSURE: 136 MMHG | RESPIRATION RATE: 20 BRPM | WEIGHT: 293 LBS | OXYGEN SATURATION: 95 %

## 2024-07-22 DIAGNOSIS — R07.9 CHEST PAIN: ICD-10-CM

## 2024-07-22 DIAGNOSIS — N39.0 UTI (URINARY TRACT INFECTION): Primary | ICD-10-CM

## 2024-07-22 LAB
ALBUMIN SERPL BCG-MCNC: 3.7 G/DL (ref 3.5–5)
ALP SERPL-CCNC: 103 U/L (ref 34–104)
ALT SERPL W P-5'-P-CCNC: 20 U/L (ref 7–52)
ANION GAP SERPL CALCULATED.3IONS-SCNC: 8 MMOL/L (ref 4–13)
AST SERPL W P-5'-P-CCNC: 19 U/L (ref 13–39)
ATRIAL RATE: 69 BPM
BACTERIA UR QL AUTO: ABNORMAL /HPF
BASOPHILS # BLD AUTO: 0.04 THOUSANDS/ÂΜL (ref 0–0.1)
BASOPHILS NFR BLD AUTO: 0 % (ref 0–1)
BILIRUB SERPL-MCNC: 0.42 MG/DL (ref 0.2–1)
BILIRUB UR QL STRIP: NEGATIVE
BNP SERPL-MCNC: 36 PG/ML (ref 0–100)
BUN SERPL-MCNC: 11 MG/DL (ref 5–25)
CALCIUM SERPL-MCNC: 8.6 MG/DL (ref 8.4–10.2)
CARDIAC TROPONIN I PNL SERPL HS: <2 NG/L
CHLORIDE SERPL-SCNC: 105 MMOL/L (ref 96–108)
CK SERPL-CCNC: 69 U/L (ref 26–192)
CLARITY UR: CLEAR
CO2 SERPL-SCNC: 25 MMOL/L (ref 21–32)
COLOR UR: ABNORMAL
CREAT SERPL-MCNC: 0.55 MG/DL (ref 0.6–1.3)
EOSINOPHIL # BLD AUTO: 0.07 THOUSAND/ÂΜL (ref 0–0.61)
EOSINOPHIL NFR BLD AUTO: 1 % (ref 0–6)
ERYTHROCYTE [DISTWIDTH] IN BLOOD BY AUTOMATED COUNT: 14.3 % (ref 11.6–15.1)
EXT PREGNANCY TEST URINE: NEGATIVE
EXT. CONTROL: NORMAL
GFR SERPL CREATININE-BSD FRML MDRD: 116 ML/MIN/1.73SQ M
GLUCOSE SERPL-MCNC: 101 MG/DL (ref 65–140)
GLUCOSE UR STRIP-MCNC: NEGATIVE MG/DL
HCT VFR BLD AUTO: 45.3 % (ref 34.8–46.1)
HGB BLD-MCNC: 13.9 G/DL (ref 11.5–15.4)
HGB UR QL STRIP.AUTO: 10
IMM GRANULOCYTES # BLD AUTO: 0.02 THOUSAND/UL (ref 0–0.2)
IMM GRANULOCYTES NFR BLD AUTO: 0 % (ref 0–2)
KETONES UR STRIP-MCNC: ABNORMAL MG/DL
LEUKOCYTE ESTERASE UR QL STRIP: 100
LYMPHOCYTES # BLD AUTO: 2.17 THOUSANDS/ÂΜL (ref 0.6–4.47)
LYMPHOCYTES NFR BLD AUTO: 24 % (ref 14–44)
MAGNESIUM SERPL-MCNC: 1.9 MG/DL (ref 1.9–2.7)
MCH RBC QN AUTO: 30.1 PG (ref 26.8–34.3)
MCHC RBC AUTO-ENTMCNC: 30.7 G/DL (ref 31.4–37.4)
MCV RBC AUTO: 98 FL (ref 82–98)
MONOCYTES # BLD AUTO: 0.6 THOUSAND/ÂΜL (ref 0.17–1.22)
MONOCYTES NFR BLD AUTO: 7 % (ref 4–12)
MUCOUS THREADS UR QL AUTO: ABNORMAL
NEUTROPHILS # BLD AUTO: 6.16 THOUSANDS/ÂΜL (ref 1.85–7.62)
NEUTS SEG NFR BLD AUTO: 68 % (ref 43–75)
NITRITE UR QL STRIP: NEGATIVE
NON-SQ EPI CELLS URNS QL MICRO: ABNORMAL /HPF
NRBC BLD AUTO-RTO: 0 /100 WBCS
P AXIS: 34 DEGREES
PH UR STRIP.AUTO: 5 [PH]
PLATELET # BLD AUTO: 258 THOUSANDS/UL (ref 149–390)
PMV BLD AUTO: 11.5 FL (ref 8.9–12.7)
POTASSIUM SERPL-SCNC: 3.9 MMOL/L (ref 3.5–5.3)
PR INTERVAL: 130 MS
PROT SERPL-MCNC: 6.4 G/DL (ref 6.4–8.4)
PROT UR STRIP-MCNC: ABNORMAL MG/DL
QRS AXIS: 41 DEGREES
QRSD INTERVAL: 84 MS
QT INTERVAL: 414 MS
QTC INTERVAL: 443 MS
RBC # BLD AUTO: 4.62 MILLION/UL (ref 3.81–5.12)
RBC #/AREA URNS AUTO: ABNORMAL /HPF
SODIUM SERPL-SCNC: 138 MMOL/L (ref 135–147)
SP GR UR STRIP.AUTO: 1.02 (ref 1–1.04)
T WAVE AXIS: 36 DEGREES
URINE COMMENT: ABNORMAL
UROBILINOGEN UA: 1 MG/DL
VENTRICULAR RATE: 69 BPM
WBC # BLD AUTO: 9.06 THOUSAND/UL (ref 4.31–10.16)
WBC #/AREA URNS AUTO: ABNORMAL /HPF

## 2024-07-22 PROCEDURE — 93010 ELECTROCARDIOGRAM REPORT: CPT | Performed by: INTERNAL MEDICINE

## 2024-07-22 PROCEDURE — 96361 HYDRATE IV INFUSION ADD-ON: CPT

## 2024-07-22 PROCEDURE — 84484 ASSAY OF TROPONIN QUANT: CPT | Performed by: EMERGENCY MEDICINE

## 2024-07-22 PROCEDURE — 96374 THER/PROPH/DIAG INJ IV PUSH: CPT

## 2024-07-22 PROCEDURE — 96375 TX/PRO/DX INJ NEW DRUG ADDON: CPT

## 2024-07-22 PROCEDURE — 82550 ASSAY OF CK (CPK): CPT | Performed by: EMERGENCY MEDICINE

## 2024-07-22 PROCEDURE — 81025 URINE PREGNANCY TEST: CPT | Performed by: EMERGENCY MEDICINE

## 2024-07-22 PROCEDURE — 83880 ASSAY OF NATRIURETIC PEPTIDE: CPT | Performed by: EMERGENCY MEDICINE

## 2024-07-22 PROCEDURE — 71046 X-RAY EXAM CHEST 2 VIEWS: CPT

## 2024-07-22 PROCEDURE — 99284 EMERGENCY DEPT VISIT MOD MDM: CPT

## 2024-07-22 PROCEDURE — 93005 ELECTROCARDIOGRAM TRACING: CPT

## 2024-07-22 PROCEDURE — 85025 COMPLETE CBC W/AUTO DIFF WBC: CPT | Performed by: EMERGENCY MEDICINE

## 2024-07-22 PROCEDURE — 36415 COLL VENOUS BLD VENIPUNCTURE: CPT | Performed by: EMERGENCY MEDICINE

## 2024-07-22 PROCEDURE — 83735 ASSAY OF MAGNESIUM: CPT | Performed by: EMERGENCY MEDICINE

## 2024-07-22 PROCEDURE — 81001 URINALYSIS AUTO W/SCOPE: CPT | Performed by: EMERGENCY MEDICINE

## 2024-07-22 PROCEDURE — 80053 COMPREHEN METABOLIC PANEL: CPT | Performed by: EMERGENCY MEDICINE

## 2024-07-22 RX ORDER — KETOROLAC TROMETHAMINE 30 MG/ML
15 INJECTION, SOLUTION INTRAMUSCULAR; INTRAVENOUS ONCE
Status: COMPLETED | OUTPATIENT
Start: 2024-07-22 | End: 2024-07-22

## 2024-07-22 RX ORDER — DIPHENHYDRAMINE HYDROCHLORIDE 50 MG/ML
25 INJECTION INTRAMUSCULAR; INTRAVENOUS ONCE
Status: COMPLETED | OUTPATIENT
Start: 2024-07-22 | End: 2024-07-22

## 2024-07-22 RX ORDER — METOCLOPRAMIDE HYDROCHLORIDE 5 MG/ML
10 INJECTION INTRAMUSCULAR; INTRAVENOUS ONCE
Status: COMPLETED | OUTPATIENT
Start: 2024-07-22 | End: 2024-07-22

## 2024-07-22 RX ORDER — CEPHALEXIN 500 MG/1
500 CAPSULE ORAL EVERY 12 HOURS SCHEDULED
Qty: 14 CAPSULE | Refills: 0 | Status: SHIPPED | OUTPATIENT
Start: 2024-07-22 | End: 2024-07-29

## 2024-07-22 RX ORDER — CEPHALEXIN 500 MG/1
500 CAPSULE ORAL ONCE
Status: COMPLETED | OUTPATIENT
Start: 2024-07-22 | End: 2024-07-22

## 2024-07-22 RX ADMIN — KETOROLAC TROMETHAMINE 15 MG: 30 INJECTION, SOLUTION INTRAMUSCULAR; INTRAVENOUS at 10:12

## 2024-07-22 RX ADMIN — METOCLOPRAMIDE 10 MG: 5 INJECTION, SOLUTION INTRAMUSCULAR; INTRAVENOUS at 10:13

## 2024-07-22 RX ADMIN — CEPHALEXIN 500 MG: 500 CAPSULE ORAL at 12:39

## 2024-07-22 RX ADMIN — DIPHENHYDRAMINE HYDROCHLORIDE 25 MG: 50 INJECTION INTRAMUSCULAR; INTRAVENOUS at 10:13

## 2024-07-22 RX ADMIN — SODIUM CHLORIDE 1000 ML: 0.9 INJECTION, SOLUTION INTRAVENOUS at 10:21

## 2024-07-22 NOTE — ED PROVIDER NOTES
History  Chief Complaint   Patient presents with    Anxiety     Pt states she is under a lot of stress and believes she is anxious. Has several complaints.      39-year-old female presenting emerged part with multiple complaints.  Patient notes that she has been under a lot of stress working a lot and feels like anxious.  While driving home today she had a panic attack where she was breathing fast and some chest pain and generalized weakness.  Patient notes that she is also having dark urine and some dysuria as well as low back pain.        Prior to Admission Medications   Prescriptions Last Dose Informant Patient Reported? Taking?   acetaminophen (TYLENOL) 500 mg tablet   No No   Sig: Take 1 tablet (500 mg total) by mouth every 6 (six) hours as needed for moderate pain   acetaminophen (TYLENOL) 500 mg tablet   No No   Sig: Take 1 tablet (500 mg total) by mouth every 6 (six) hours as needed (pain)   acetaminophen (TYLENOL) 500 mg tablet   No No   Sig: Take 1 tablet (500 mg total) by mouth every 6 (six) hours as needed for moderate pain   albuterol (2.5 mg/3 mL) 0.083 % nebulizer solution   No No   Sig: Take 3 mL (2.5 mg total) by nebulization every 6 (six) hours as needed for wheezing   albuterol (PROVENTIL HFA,VENTOLIN HFA) 90 mcg/act inhaler   No No   Sig: Inhale 2 puffs every 4 (four) hours as needed for wheezing   albuterol (ProAir HFA) 90 mcg/act inhaler   No No   Sig: Inhale 2 puffs every 6 (six) hours as needed for wheezing   albuterol (Proventil HFA) 90 mcg/act inhaler   No No   Sig: Inhale 1-2 puffs every 6 (six) hours as needed for wheezing or shortness of breath   benzocaine (ORAJEL) 10 % mucosal gel   No No   Sig: Apply 1 application to the mouth or throat as needed for mucositis   calcium carbonate (OS-KARINA) 600 MG tablet  Self Yes No   Sig: Take 600 mg by mouth daily   fluticasone (FLONASE) 50 mcg/act nasal spray   No No   Si sprays into each nostril daily for 10 days   ibuprofen (MOTRIN) 800 mg  tablet   No No   Sig: Take 1 tablet (800 mg total) by mouth every 8 (eight) hours   ketorolac (TORADOL) 10 mg tablet   No No   Sig: Take 1 tablet (10 mg total) by mouth every 6 (six) hours as needed for moderate pain   Patient not taking: Reported on 2023   loperamide (IMODIUM) 2 mg capsule   No No   Sig: Take 1 capsule (2 mg total) by mouth 4 (four) times a day as needed for diarrhea   loratadine (CLARITIN) 10 mg tablet   No No   Sig: Take 1 tablet (10 mg total) by mouth daily   Patient not taking: Reported on 2023   menthol-cetylpyridinium (CEPACOL) 3 MG lozenge   No No   Sig: Take 1 lozenge (3 mg total) by mouth as needed for sore throat   metoclopramide (Reglan) 10 mg tablet   No No   Sig: Take 1 tablet (10 mg total) by mouth every 6 (six) hours   Patient not taking: Reported on 2023   predniSONE 50 mg tablet   No No   Sig: Take 1 tablet (50 mg total) by mouth daily   Patient not taking: Reported on 2023      Facility-Administered Medications: None       Past Medical History:   Diagnosis Date    Asthma     Seasonal allergies        Past Surgical History:   Procedure Laterality Date     SECTION      five     SECTION      CHOLECYSTECTOMY      GASTRECTOMY SLEEVE LAPAROSCOPIC  10/12/2021    TONSILLECTOMY         History reviewed. No pertinent family history.  I have reviewed and agree with the history as documented.    E-Cigarette/Vaping    E-Cigarette Use Never User      E-Cigarette/Vaping Substances     Social History     Tobacco Use    Smoking status: Every Day     Current packs/day: 0.25     Types: Cigarettes    Smokeless tobacco: Never   Vaping Use    Vaping status: Never Used   Substance Use Topics    Alcohol use: Yes     Comment: occassionally    Drug use: Yes     Types: Marijuana     Comment: medical       Review of Systems   Constitutional:  Negative for chills and fever.   HENT:  Negative for ear pain and sore throat.    Eyes:  Negative for pain and visual disturbance.    Respiratory:  Positive for shortness of breath. Negative for cough.    Cardiovascular:  Positive for chest pain. Negative for palpitations.   Gastrointestinal:  Negative for abdominal pain and vomiting.   Genitourinary:  Positive for dysuria. Negative for hematuria.   Musculoskeletal:  Positive for back pain. Negative for arthralgias.   Skin:  Negative for color change and rash.   Neurological:  Positive for weakness and headaches. Negative for seizures and syncope.   Psychiatric/Behavioral:  The patient is nervous/anxious.    All other systems reviewed and are negative.      Physical Exam  Physical Exam  Vitals and nursing note reviewed.   Constitutional:       General: She is not in acute distress.     Appearance: She is well-developed.   HENT:      Head: Normocephalic and atraumatic.   Eyes:      Conjunctiva/sclera: Conjunctivae normal.   Cardiovascular:      Rate and Rhythm: Normal rate and regular rhythm.      Heart sounds: No murmur heard.  Pulmonary:      Effort: Pulmonary effort is normal. No respiratory distress.      Breath sounds: Normal breath sounds.   Abdominal:      Palpations: Abdomen is soft.      Tenderness: There is no abdominal tenderness.   Musculoskeletal:         General: No swelling.      Cervical back: Neck supple.   Skin:     General: Skin is warm and dry.      Capillary Refill: Capillary refill takes less than 2 seconds.   Neurological:      Mental Status: She is alert.   Psychiatric:         Mood and Affect: Mood normal.         Vital Signs  ED Triage Vitals   Temperature Pulse Respirations Blood Pressure SpO2   07/22/24 0922 07/22/24 0922 07/22/24 0922 07/22/24 0922 07/22/24 0922   98.3 °F (36.8 °C) 82 20 130/72 94 %      Temp Source Heart Rate Source Patient Position - Orthostatic VS BP Location FiO2 (%)   07/22/24 0922 07/22/24 0922 07/22/24 0922 07/22/24 0922 --   Tympanic Monitor Sitting Left arm       Pain Score       07/22/24 1012       10 - Worst Possible Pain           Vitals:     07/22/24 0922 07/22/24 1214   BP: 130/72 136/62   Pulse: 82 66   Patient Position - Orthostatic VS: Sitting Lying         Visual Acuity  Visual Acuity      Flowsheet Row Most Recent Value   L Pupil Size (mm) 3   R Pupil Size (mm) 3            ED Medications  Medications   cephalexin (KEFLEX) capsule 500 mg (has no administration in time range)   sodium chloride 0.9 % bolus 1,000 mL (1,000 mL Intravenous New Bag 7/22/24 1021)   ketorolac (TORADOL) injection 15 mg (15 mg Intravenous Given 7/22/24 1012)   metoclopramide (REGLAN) injection 10 mg (10 mg Intravenous Given 7/22/24 1013)   diphenhydrAMINE (BENADRYL) injection 25 mg (25 mg Intravenous Given 7/22/24 1013)       Diagnostic Studies  Results Reviewed       Procedure Component Value Units Date/Time    HS Troponin I 4hr [[id number]]     Lab Status: No result Specimen: Blood     HS Troponin I 2hr [[id number]]     Lab Status: No result Specimen: Blood     HS Troponin 0hr (reflex protocol) [[id number]]  (Normal) Collected: 07/22/24 1055    Lab Status: Final result Specimen: Blood from Arm, Right Updated: 07/22/24 1127     hs TnI 0hr <2 ng/L     CBC and differential [[id number]]  (Abnormal) Collected: 07/22/24 1055    Lab Status: Final result Specimen: Blood from Arm, Right Updated: 07/22/24 1104     WBC 9.06 Thousand/uL      RBC 4.62 Million/uL      Hemoglobin 13.9 g/dL      Hematocrit 45.3 %      MCV 98 fL      MCH 30.1 pg      MCHC 30.7 g/dL      RDW 14.3 %      MPV 11.5 fL      Platelets 258 Thousands/uL      nRBC 0 /100 WBCs      Segmented % 68 %      Immature Grans % 0 %      Lymphocytes % 24 %      Monocytes % 7 %      Eosinophils Relative 1 %      Basophils Relative 0 %      Absolute Neutrophils 6.16 Thousands/µL      Absolute Immature Grans 0.02 Thousand/uL      Absolute Lymphocytes 2.17 Thousands/µL      Absolute Monocytes 0.60 Thousand/µL      Eosinophils Absolute 0.07 Thousand/µL      Basophils Absolute 0.04 Thousands/µL     Urine Microscopic [[id number]]   (Abnormal) Collected: 07/22/24 1004    Lab Status: Final result Specimen: Urine, Other Updated: 07/22/24 1102     RBC, UA 0-1 /hpf      WBC, UA 4-10 /hpf      Epithelial Cells Moderate /hpf      Bacteria, UA Innumerable /hpf      MUCUS THREADS Moderate     URINE COMMENT Concentrated microscopic with low volume urine.  Only 2cc,    B-Type Natriuretic Peptide(BNP) [[id number]]  (Normal) Collected: 07/22/24 1017    Lab Status: Final result Specimen: Blood from Hand, Left Updated: 07/22/24 1047     BNP 36 pg/mL     Comprehensive metabolic panel [[id number]]  (Abnormal) Collected: 07/22/24 1017    Lab Status: Final result Specimen: Blood from Hand, Left Updated: 07/22/24 1041     Sodium 138 mmol/L      Potassium 3.9 mmol/L      Chloride 105 mmol/L      CO2 25 mmol/L      ANION GAP 8 mmol/L      BUN 11 mg/dL      Creatinine 0.55 mg/dL      Glucose 101 mg/dL      Calcium 8.6 mg/dL      AST 19 U/L      ALT 20 U/L      Alkaline Phosphatase 103 U/L      Total Protein 6.4 g/dL      Albumin 3.7 g/dL      Total Bilirubin 0.42 mg/dL      eGFR 116 ml/min/1.73sq m     Narrative:      National Kid[employer] Foundation guidelines for Chronic Kidney Disease (CKD):     Stage 1 with normal or high GFR (GFR > 90 mL/min/1.73 square meters)    Stage 2 Mild CKD (GFR = 60-89 mL/min/1.73 square meters)    Stage 3A Moderate CKD (GFR = 45-59 mL/min/1.73 square meters)    Stage 3B Moderate CKD (GFR = 30-44 mL/min/1.73 square meters)    Stage 4 Severe CKD (GFR = 15-29 mL/min/1.73 square meters)    Stage 5 End Stage CKD (GFR <15 mL/min/1.73 square meters)  Note: GFR calculation is accurate only with a steady state creatinine    CK [[id number]]  (Normal) Collected: 07/22/24 1017    Lab Status: Final result Specimen: Blood from Hand, Left Updated: 07/22/24 1041     Total CK 69 U/L     Magnesium [[id number]]  (Normal) Collected: 07/22/24 1017    Lab Status: Final result Specimen: Blood from Hand, Left Updated: 07/22/24 1041     Magnesium 1.9 mg/dL      UA (URINE) with reflex to Scope [[id number]]  (Abnormal) Collected: 07/22/24 1004    Lab Status: Final result Specimen: Urine, Other Updated: 07/22/24 1026     Color, UA Brown     Clarity, UA Clear     Specific Gravity, UA 1.025     pH, UA 5.0     Leukocytes, .0     Nitrite, UA Negative     Protein, UA 30 (1+) mg/dl      Glucose, UA Negative mg/dl      Ketones, UA 5 (Trace) mg/dl      Bilirubin, UA Negative     Occult Blood, UA 10.0     UROBILINOGEN UA 1.0 mg/dL     POCT pregnancy, urine [[id number]]  (Normal) Resulted: 07/22/24 1000    Lab Status: Final result Updated: 07/22/24 1000     EXT Preg Test, Ur Negative     Control Valid                   XR chest 2 views   Final Result by Artur Mcdaniel MD (07/22 1119)      No acute cardiopulmonary disease.            Workstation performed: [id number]                    Procedures  Procedures         ED Course                                 SBIRT 20yo+      Flowsheet Row Most Recent Value   Initial Alcohol Screen: US AUDIT-C     1. How often do you have a drink containing alcohol? 0 Filed at: 07/22/2024 0920   2. How many drinks containing alcohol do you have on a typical day you are drinking?  0 Filed at: 07/22/2024 0920   3b. FEMALE Any Age, or MALE 65+: How often do you have 4 or more drinks on one occassion? 0 Filed at: 07/22/2024 0920   Audit-C Score 0 Filed at: 07/22/2024 0920   DENNY: How many times in the past year have you...    Used an illegal drug or used a prescription medication for non-medical reasons? Never Filed at: 07/22/2024 0920                      Medical Decision Making  39-year-old female with multiple complaints, generalized weakness.  Differential includes UTI, anxiety attack, ACS.  Low risk for ACS by heart score.  Troponin negative, EKG normal.  UA consistent with UTI.  Likely cause of symptoms.  Keflex prescribed.    Amount and/or Complexity of Data Reviewed  Labs: ordered.  Radiology: ordered.    Risk  Prescription drug  management.                 Disposition  Final diagnoses:   UTI (urinary tract infection)   Chest pain     Time reflects when diagnosis was documented in both MDM as applicable and the Disposition within this note       Time User Action Codes Description Comment    7/22/2024 12:22 PM Usman Rodriguez Add [N39.0] UTI (urinary tract infection)     7/22/2024 12:22 PM Usman Rodriguez Add [R07.9] Chest pain           ED Disposition       ED Disposition   Discharge    Condition   Stable    Date/Time   Mon Jul 22, 2024 1222    Comment   Aicha Vasquez discharge to home/self care.                   Follow-up Information       Follow up With Specialties Details Why Contact Info    Oscar Shepherd MD Family Medicine   [address], [address]  [address]  [facility] [address] [phone]              Current Discharge Medication List        START taking these medications    Details   cephalexin (KEFLEX) 500 mg capsule Take 1 capsule (500 mg total) by mouth every 12 (twelve) hours for 7 days  Qty: 14 capsule, Refills: 0    Associated Diagnoses: UTI (urinary tract infection)           CONTINUE these medications which have NOT CHANGED    Details   !! acetaminophen (TYLENOL) 500 mg tablet Take 1 tablet (500 mg total) by mouth every 6 (six) hours as needed for moderate pain  Qty: 30 tablet, Refills: 0    Associated Diagnoses: Pedal edema      !! acetaminophen (TYLENOL) 500 mg tablet Take 1 tablet (500 mg total) by mouth every 6 (six) hours as needed (pain)  Qty: 30 tablet, Refills: 0    Associated Diagnoses: Dental infection      !! acetaminophen (TYLENOL) 500 mg tablet Take 1 tablet (500 mg total) by mouth every 6 (six) hours as needed for moderate pain  Qty: 30 tablet, Refills: 0    Associated Diagnoses: Chest pain; Flu-like symptoms      albuterol (2.5 mg/3 mL) 0.083 % nebulizer solution Take 3 mL (2.5 mg total) by nebulization every 6 (six) hours as needed for wheezing  Qty: 30 mL, Refills: 0    Associated Diagnoses: Asthma  exacerbation      !! albuterol (ProAir HFA) 90 mcg/act inhaler Inhale 2 puffs every 6 (six) hours as needed for wheezing  Qty: 8.5 g, Refills: 0    Comments: Substitution to a formulary equivalent within the same pharmaceutical class is authorized.  Associated Diagnoses: Viral syndrome      !! albuterol (Proventil HFA) 90 mcg/act inhaler Inhale 1-2 puffs every 6 (six) hours as needed for wheezing or shortness of breath  Qty: 6.7 g, Refills: 0    Comments: Substitution to a formulary equivalent within the same pharmaceutical class is authorized.  Associated Diagnoses: Acute asthma exacerbation      !! albuterol (PROVENTIL HFA,VENTOLIN HFA) 90 mcg/act inhaler Inhale 2 puffs every 4 (four) hours as needed for wheezing  Qty: 1 Inhaler, Refills: 0    Comments: Substitution to a formulary equivalent within the same pharmaceutical class is authorized.  Associated Diagnoses: Acute bronchitis      benzocaine (ORAJEL) 10 % mucosal gel Apply 1 application to the mouth or throat as needed for mucositis  Qty: 5.3 g, Refills: 0    Associated Diagnoses: Dental infection      calcium carbonate (OS-KARINA) 600 MG tablet Take 600 mg by mouth daily      fluticasone (FLONASE) 50 mcg/act nasal spray 2 sprays into each nostril daily for 10 days  Qty: 11.1 mL, Refills: 0    Associated Diagnoses: Viral syndrome      ibuprofen (MOTRIN) 800 mg tablet Take 1 tablet (800 mg total) by mouth every 8 (eight) hours  Qty: 21 tablet, Refills: 0    Associated Diagnoses: Viral syndrome      ketorolac (TORADOL) 10 mg tablet Take 1 tablet (10 mg total) by mouth every 6 (six) hours as needed for moderate pain  Qty: 20 tablet, Refills: 0    Associated Diagnoses: Dental infection      loperamide (IMODIUM) 2 mg capsule Take 1 capsule (2 mg total) by mouth 4 (four) times a day as needed for diarrhea  Qty: 12 capsule, Refills: 0    Associated Diagnoses: Viral syndrome      loratadine (CLARITIN) 10 mg tablet Take 1 tablet (10 mg total) by mouth daily  Qty: 30  tablet, Refills: 0    Associated Diagnoses: Viral URI with cough      menthol-cetylpyridinium (CEPACOL) 3 MG lozenge Take 1 lozenge (3 mg total) by mouth as needed for sore throat  Qty: 27 lozenge, Refills: 0    Associated Diagnoses: Viral syndrome      metoclopramide (Reglan) 10 mg tablet Take 1 tablet (10 mg total) by mouth every 6 (six) hours  Qty: 30 tablet, Refills: 0    Associated Diagnoses: Chest pain; Flu-like symptoms      predniSONE 50 mg tablet Take 1 tablet (50 mg total) by mouth daily  Qty: 4 tablet, Refills: 0    Associated Diagnoses: Acute asthma exacerbation       !! - Potential duplicate medications found. Please discuss with provider.          No discharge procedures on file.    PDMP Review         Value Time User    PDMP Reviewed  Yes 7/5/2022  1:37 PM Malu Wheeler MD            ED Provider  Electronically Signed by             Usman Rodriguez MD  07/22/24 7633

## 2024-07-22 NOTE — Clinical Note
Aicha Vasquez was seen and treated in our emergency department on 7/22/2024.                Diagnosis:     Aicha  may return to work on return date.    She may return on this date: 07/24/2024         If you have any questions or concerns, please don't hesitate to call.      Usman Rodriguez MD    ______________________________           _______________          _______________  Hospital Representative                              Date                                Time

## 2024-09-18 ENCOUNTER — HOSPITAL ENCOUNTER (EMERGENCY)
Facility: HOSPITAL | Age: 39
Discharge: HOME | End: 2024-09-18
Attending: EMERGENCY MEDICINE
Payer: MEDICARE

## 2024-09-18 ENCOUNTER — APPOINTMENT (EMERGENCY)
Dept: RADIOLOGY | Facility: HOSPITAL | Age: 39
End: 2024-09-18
Payer: MEDICARE

## 2024-09-18 VITALS
RESPIRATION RATE: 18 BRPM | TEMPERATURE: 98.1 F | HEART RATE: 76 BPM | SYSTOLIC BLOOD PRESSURE: 133 MMHG | WEIGHT: 293 LBS | OXYGEN SATURATION: 96 % | DIASTOLIC BLOOD PRESSURE: 85 MMHG | BODY MASS INDEX: 66.18 KG/M2

## 2024-09-18 DIAGNOSIS — B34.9 VIRAL ILLNESS: Primary | ICD-10-CM

## 2024-09-18 LAB
FLUAV AG UPPER RESP QL IA.RAPID: NEGATIVE
FLUBV AG UPPER RESP QL IA.RAPID: NEGATIVE
SARS-COV+SARS-COV-2 AG RESP QL IA.RAPID: NEGATIVE

## 2024-09-18 PROCEDURE — 87811 SARS-COV-2 COVID19 W/OPTIC: CPT | Performed by: EMERGENCY MEDICINE

## 2024-09-18 PROCEDURE — 99284 EMERGENCY DEPT VISIT MOD MDM: CPT | Performed by: EMERGENCY MEDICINE

## 2024-09-18 PROCEDURE — 71046 X-RAY EXAM CHEST 2 VIEWS: CPT

## 2024-09-18 PROCEDURE — 99283 EMERGENCY DEPT VISIT LOW MDM: CPT

## 2024-09-18 PROCEDURE — 87804 INFLUENZA ASSAY W/OPTIC: CPT | Performed by: EMERGENCY MEDICINE

## 2024-09-18 RX ORDER — ALBUTEROL SULFATE 90 UG/1
2 INHALANT RESPIRATORY (INHALATION) EVERY 4 HOURS PRN
Qty: 8.5 G | Refills: 0 | Status: SHIPPED | OUTPATIENT
Start: 2024-09-18

## 2024-09-18 NOTE — Clinical Note
Aicha Vasquez was seen and treated in our emergency department on 9/18/2024.                Diagnosis:     Aicha  .    She may return on this date: 09/20/2024         If you have any questions or concerns, please don't hesitate to call.      Eric Suresh MD    ______________________________           _______________          _______________  Hospital Representative                              Date                                Time

## 2024-09-18 NOTE — ED PROVIDER NOTES
1. Viral illness      ED Disposition       ED Disposition   Discharge    Condition   Stable    Date/Time   Wed Sep 18, 2024  1:26 PM    Comment   Aicha Vasquez discharge to home/self care.                   Assessment & Plan       Medical Decision Making  Flu COVID negative x-ray shows no infiltrates or pneumothorax my reading viral process no respiratory distress or airway compromise placed on inhaler    Amount and/or Complexity of Data Reviewed  Labs: ordered.  Radiology: ordered and independent interpretation performed.                       Medications - No data to display    History of Present Illness       Patient is here with her daughter she is patient says she has been sick for a week cough hurts to cough she was taking care of her grandkids son who had pneumonia she denies any sore throat she said she felt warm but did not take her temperature and she has bodyaches vomiting or diarrhea no rash      Flu Symptoms  Presenting symptoms: cough    Presenting symptoms: no diarrhea, no shortness of breath and no vomiting    Associated symptoms: chills            Review of Systems   Constitutional:  Positive for chills.   Eyes:  Negative for redness.   Respiratory:  Positive for cough. Negative for shortness of breath.    Gastrointestinal:  Negative for abdominal pain, diarrhea and vomiting.   Musculoskeletal:  Negative for back pain and neck pain.   Skin:  Negative for color change and rash.   Neurological:  Negative for seizures, syncope, weakness and numbness.   All other systems reviewed and are negative.          Objective     ED Triage Vitals   Temperature Pulse Blood Pressure Respirations SpO2 Patient Position - Orthostatic VS   09/18/24 1216 09/18/24 1214 09/18/24 1216 09/18/24 1216 09/18/24 1216 09/18/24 1216   98.1 °F (36.7 °C) 76 133/85 18 96 % Sitting      Temp Source Heart Rate Source BP Location FiO2 (%) Pain Score    09/18/24 1216 -- 09/18/24 1216 -- --    Oral  Left arm          Physical Exam  Vitals  and nursing note reviewed.   Constitutional:       General: She is not in acute distress.     Appearance: She is well-developed.   HENT:      Head: Normocephalic and atraumatic.      Mouth/Throat:      Mouth: Mucous membranes are moist.      Pharynx: No oropharyngeal exudate or posterior oropharyngeal erythema.   Eyes:      Extraocular Movements: Extraocular movements intact.      Conjunctiva/sclera: Conjunctivae normal.      Pupils: Pupils are equal, round, and reactive to light.   Cardiovascular:      Rate and Rhythm: Normal rate and regular rhythm.      Heart sounds: No murmur heard.  Pulmonary:      Effort: Pulmonary effort is normal. No respiratory distress.      Breath sounds: Normal breath sounds. No stridor. No wheezing, rhonchi or rales.   Abdominal:      Palpations: Abdomen is soft.      Tenderness: There is no abdominal tenderness.   Musculoskeletal:         General: No swelling.      Cervical back: Neck supple.   Skin:     General: Skin is warm and dry.      Capillary Refill: Capillary refill takes less than 2 seconds.   Neurological:      General: No focal deficit present.      Mental Status: She is alert.      Comments: Oriented   Psychiatric:         Mood and Affect: Mood normal.         Labs Reviewed   COVID-19/INFLUENZA A/B RAPID ANTIGEN (30 MIN.TAT) - Normal       Result Value    SARS COV Rapid Antigen Negative      Influenza A Rapid Antigen Negative      Influenza B Rapid Antigen Negative      Narrative:     This test has been performed using the [employer]idel Ange 2 FLU+SARS Antigen test under the Emergency Use Authorization (EUA). This test has been validated by the  and verified by the performing laboratory. The Ange uses lateral flow immunofluorescent sandwich assay to detect SARS-COV, Influenza A and Influenza B Antigen.     The Quidel Ange 2 SARS Antigen test does not differentiate between SARS-CoV and SARS-CoV-2.     Negative results are presumptive and may be confirmed with a  molecular assay, if necessary, for patient management. Negative results do not rule out SARS-CoV-2 or influenza infection and should not be used as the sole basis for treatment or patient management decisions. A negative test result may occur if the level of antigen in a sample is below the limit of detection of this test.     Positive results are indicative of the presence of viral antigens, but do not rule out bacterial infection or co-infection with other viruses.     All test results should be used as an adjunct to clinical observations and other information available to the provider.    FOR PEDIATRIC PATIENTS - copy/paste COVID Guidelines URL to browser: [email or web address]     XR chest 2 views   ED Interpretation by Eric Suresh MD (09/18 5317)   nad          Procedures       Eric Suresh MD  09/18/24 8210

## 2025-03-19 ENCOUNTER — HOSPITAL ENCOUNTER (EMERGENCY)
Facility: HOSPITAL | Age: 40
Discharge: HOME | End: 2025-03-19
Attending: EMERGENCY MEDICINE | Admitting: EMERGENCY MEDICINE
Payer: MEDICARE

## 2025-03-19 ENCOUNTER — APPOINTMENT (EMERGENCY)
Dept: RADIOLOGY | Facility: HOSPITAL | Age: 40
End: 2025-03-19
Payer: MEDICARE

## 2025-03-19 VITALS
RESPIRATION RATE: 20 BRPM | SYSTOLIC BLOOD PRESSURE: 131 MMHG | WEIGHT: 293 LBS | BODY MASS INDEX: 67.65 KG/M2 | TEMPERATURE: 99 F | DIASTOLIC BLOOD PRESSURE: 73 MMHG | OXYGEN SATURATION: 99 % | HEART RATE: 93 BPM

## 2025-03-19 VITALS
OXYGEN SATURATION: 98 % | RESPIRATION RATE: 18 BRPM | HEART RATE: 88 BPM | TEMPERATURE: 98.6 F | DIASTOLIC BLOOD PRESSURE: 78 MMHG | SYSTOLIC BLOOD PRESSURE: 144 MMHG

## 2025-03-19 DIAGNOSIS — L03.311 CELLULITIS OF ABDOMINAL WALL: ICD-10-CM

## 2025-03-19 DIAGNOSIS — B37.2 CANDIDAL SKIN INFECTION: Primary | ICD-10-CM

## 2025-03-19 DIAGNOSIS — J11.1 INFLUENZA: Primary | ICD-10-CM

## 2025-03-19 LAB
EXT PREGNANCY TEST URINE: NEGATIVE
EXT. CONTROL: NORMAL
FLUAV AG UPPER RESP QL IA.RAPID: NEGATIVE
FLUBV AG UPPER RESP QL IA.RAPID: POSITIVE
SARS-COV+SARS-COV-2 AG RESP QL IA.RAPID: NEGATIVE

## 2025-03-19 PROCEDURE — 87205 SMEAR GRAM STAIN: CPT

## 2025-03-19 PROCEDURE — 99284 EMERGENCY DEPT VISIT MOD MDM: CPT

## 2025-03-19 PROCEDURE — 99282 EMERGENCY DEPT VISIT SF MDM: CPT

## 2025-03-19 PROCEDURE — 71046 X-RAY EXAM CHEST 2 VIEWS: CPT

## 2025-03-19 PROCEDURE — 87804 INFLUENZA ASSAY W/OPTIC: CPT | Performed by: EMERGENCY MEDICINE

## 2025-03-19 PROCEDURE — 81025 URINE PREGNANCY TEST: CPT

## 2025-03-19 PROCEDURE — 87811 SARS-COV-2 COVID19 W/OPTIC: CPT | Performed by: EMERGENCY MEDICINE

## 2025-03-19 PROCEDURE — 87186 SC STD MICRODIL/AGAR DIL: CPT

## 2025-03-19 PROCEDURE — 87070 CULTURE OTHR SPECIMN AEROBIC: CPT

## 2025-03-19 PROCEDURE — 87077 CULTURE AEROBIC IDENTIFY: CPT

## 2025-03-19 PROCEDURE — 99283 EMERGENCY DEPT VISIT LOW MDM: CPT

## 2025-03-19 PROCEDURE — 99284 EMERGENCY DEPT VISIT MOD MDM: CPT | Performed by: EMERGENCY MEDICINE

## 2025-03-19 RX ORDER — NYSTATIN 100000 [USP'U]/G
POWDER TOPICAL 3 TIMES DAILY
Qty: 30 G | Refills: 0 | Status: SHIPPED | OUTPATIENT
Start: 2025-03-19

## 2025-03-19 RX ORDER — SULFAMETHOXAZOLE AND TRIMETHOPRIM 800; 160 MG/1; MG/1
1 TABLET ORAL 2 TIMES DAILY
Qty: 14 TABLET | Refills: 0 | Status: SHIPPED | OUTPATIENT
Start: 2025-03-19 | End: 2025-03-26

## 2025-03-19 RX ORDER — PREDNISONE 20 MG/1
60 TABLET ORAL ONCE
Status: COMPLETED | OUTPATIENT
Start: 2025-03-19 | End: 2025-03-19

## 2025-03-19 RX ADMIN — PREDNISONE 60 MG: 20 TABLET ORAL at 10:38

## 2025-03-19 NOTE — Clinical Note
Aicha Vasquez was seen and treated in our emergency department on 3/19/2025.                Diagnosis:     Aicha  .    She may return on this date: 03/21/2025         If you have any questions or concerns, please don't hesitate to call.      Eric Suresh MD    ______________________________           _______________          _______________  Hospital Representative                              Date                                Time

## 2025-03-19 NOTE — ED PROVIDER NOTES
Time reflects when diagnosis was documented in both MDM as applicable and the Disposition within this note       Time User Action Codes Description Comment    3/19/2025 11:12 AM Eric Suresh Add [J11.1] Influenza           ED Disposition       ED Disposition   Discharge    Condition   Stable    Date/Time   Wed Mar 19, 2025 11:12 AM    Comment   Aicha Vasquez discharge to home/self care.                   Assessment & Plan       Medical Decision Making  Patient has influenza B nontoxic no respiratory distress x-ray shows no infiltrates pneumothorax patient already has albuterol at home for cough    Amount and/or Complexity of Data Reviewed  Labs: ordered.  Radiology: ordered and independent interpretation performed.    Risk  Prescription drug management.             Medications   predniSONE tablet 60 mg (60 mg Oral Given 3/19/25 1038)       ED Risk Strat Scores                            SBIRT 22yo+      Flowsheet Row Most Recent Value   Initial Alcohol Screen: US AUDIT-C     1. How often do you have a drink containing alcohol? 0 Filed at: 2025 1007   2. How many drinks containing alcohol do you have on a typical day you are drinking?  0 Filed at: 2025 1007   3a. Male UNDER 65: How often do you have five or more drinks on one occasion? 0 Filed at: 2025 1007   3b. FEMALE Any Age, or MALE 65+: How often do you have 4 or more drinks on one occassion? 0 Filed at: 2025 1007   Audit-C Score 0 Filed at: 2025 1007   DENNY: How many times in the past year have you...    Used an illegal drug or used a prescription medication for non-medical reasons? Never Filed at: 2025 1007                            History of Present Illness       Chief Complaint   Patient presents with    Flu Symptoms     Cough, weak, fatigue and congestion for couple days.        Past Medical History:   Diagnosis Date    Asthma     Seasonal allergies       Past Surgical History:   Procedure Laterality Date      SECTION      five     SECTION      CHOLECYSTECTOMY      GASTRECTOMY SLEEVE LAPAROSCOPIC  10/12/2021    TONSILLECTOMY        History reviewed. No pertinent family history.   Social History     Tobacco Use    Smoking status: Every Day     Current packs/day: 0.25     Types: Cigarettes    Smokeless tobacco: Never   Vaping Use    Vaping status: Never Used   Substance Use Topics    Alcohol use: Yes     Comment: occassionally    Drug use: Yes     Types: Marijuana     Comment: medical      E-Cigarette/Vaping    E-Cigarette Use Never User       E-Cigarette/Vaping Substances      I have reviewed and agree with the history as documented.     With flulike symptoms for couple days he has daughter who is being seen for the same patient has a history of asthma she has been coughing a lot she states she has wheeze little bit but using her inhaler she also has bodyaches denies fevers vomiting diarrhea chest pain      Flu Symptoms  Presenting symptoms: cough and shortness of breath    Presenting symptoms: no fever, no sore throat and no vomiting    Associated symptoms: no chills        Review of Systems   Constitutional:  Negative for chills and fever.   HENT:  Negative for sore throat.    Eyes:  Negative for redness.   Respiratory:  Positive for cough and shortness of breath.    Cardiovascular:  Negative for chest pain and palpitations.   Gastrointestinal:  Negative for abdominal pain and vomiting.   Skin:  Negative for color change and rash.   Neurological:  Negative for seizures and syncope.   All other systems reviewed and are negative.          Objective       ED Triage Vitals [25 1006]   Temperature Pulse Blood Pressure Respirations SpO2 Patient Position - Orthostatic VS   98.6 °F (37 °C) 88 144/78 18 98 % Standing      Temp Source Heart Rate Source BP Location FiO2 (%) Pain Score    Oral -- Left arm -- --      Vitals      Date and Time Temp Pulse SpO2 Resp BP Pain Score FACES Pain Rating User   25 1006 98.6  °F (37 °C) 88 98 % 18 144/78 -- -- CO            Physical Exam  Vitals and nursing note reviewed.   Constitutional:       General: She is not in acute distress.     Appearance: She is well-developed. She is not ill-appearing, toxic-appearing or diaphoretic.   HENT:      Head: Normocephalic and atraumatic.      Mouth/Throat:      Mouth: Mucous membranes are moist.   Eyes:      Extraocular Movements: Extraocular movements intact.      Conjunctiva/sclera: Conjunctivae normal.   Cardiovascular:      Rate and Rhythm: Normal rate and regular rhythm.      Heart sounds: No murmur heard.  Pulmonary:      Effort: Pulmonary effort is normal. No respiratory distress.      Breath sounds: Normal breath sounds. No stridor. No wheezing, rhonchi or rales.   Abdominal:      Palpations: Abdomen is soft.      Tenderness: There is no abdominal tenderness.   Musculoskeletal:      Cervical back: Normal range of motion and neck supple. No rigidity or tenderness.      Comments: Normal swelling to pretibial area bilaterally   Lymphadenopathy:      Cervical: No cervical adenopathy.   Skin:     General: Skin is warm and dry.      Capillary Refill: Capillary refill takes less than 2 seconds.   Neurological:      General: No focal deficit present.      Mental Status: She is alert.   Psychiatric:         Mood and Affect: Mood normal.         Results Reviewed       Procedure Component Value Units Date/Time    FLU/COVID Rapid Antigen (30 min. TAT) - Preferred screening test in ED [[id number]]  (Abnormal) Collected: 03/19/25 1045    Lab Status: Final result Specimen: Nares from Nose Updated: 03/19/25 1108     SARS COV Rapid Antigen Negative     Influenza A Rapid Antigen Negative     Influenza B Rapid Antigen Positive    Narrative:      This test has been performed using the [employer] Ange 2 FLU+SARS Antigen test under the Emergency Use Authorization (EUA). This test has been validated by the  and verified by the performing laboratory. The  Ange uses lateral flow immunofluorescent sandwich assay to detect SARS-COV, Influenza A and Influenza B Antigen.     The [employer]idel Ange 2 SARS Antigen test does not differentiate between SARS-CoV and SARS-CoV-2.     Negative results are presumptive and may be confirmed with a molecular assay, if necessary, for patient management. Negative results do not rule out SARS-CoV-2 or influenza infection and should not be used as the sole basis for treatment or patient management decisions. A negative test result may occur if the level of antigen in a sample is below the limit of detection of this test.     Positive results are indicative of the presence of viral antigens, but do not rule out bacterial infection or co-infection with other viruses.     All test results should be used as an adjunct to clinical observations and other information available to the provider.    FOR PEDIATRIC PATIENTS - copy/paste COVID Guidelines URL to browser: [email or web address]    POCT pregnancy, urine [[id number]]  (Normal) Collected: 03/19/25 1040    Lab Status: Final result Updated: 03/19/25 1040     EXT Preg Test, Ur Negative     Control Valid            XR chest 2 views   ED Interpretation by Eric Suresh MD (03/19 1057)   nad          Procedures    ED Medication and Procedure Management   Prior to Admission Medications   Prescriptions Last Dose Informant Patient Reported? Taking?   albuterol (2.5 mg/3 mL) 0.083 % nebulizer solution   No No   Sig: Take 3 mL (2.5 mg total) by nebulization every 6 (six) hours as needed for wheezing   albuterol (ProAir HFA) 90 mcg/act inhaler   No No   Sig: Inhale 2 puffs every 6 (six) hours as needed for wheezing   albuterol (ProAir HFA) 90 mcg/act inhaler   No No   Sig: Inhale 2 puffs every 4 (four) hours as needed for wheezing or shortness of breath   calcium carbonate (OS-KARINA) 600 MG tablet  Self Yes No   Sig: Take 600 mg by mouth daily   fluticasone  (FLONASE) 50 mcg/act nasal spray   No No   Si sprays into each nostril daily for 10 days   loperamide (IMODIUM) 2 mg capsule   No No   Sig: Take 1 capsule (2 mg total) by mouth 4 (four) times a day as needed for diarrhea   menthol-cetylpyridinium (CEPACOL) 3 MG lozenge   No No   Sig: Take 1 lozenge (3 mg total) by mouth as needed for sore throat      Facility-Administered Medications: None     Patient's Medications   Discharge Prescriptions    No medications on file     No discharge procedures on file.  ED SEPSIS DOCUMENTATION   Time reflects when diagnosis was documented in both MDM as applicable and the Disposition within this note       Time User Action Codes Description Comment    3/19/2025 11:12 AM Eric Suresh Add [J11.1] Influenza                  Eric Suresh MD  25 1117

## 2025-03-19 NOTE — ED PROVIDER NOTES
"Time reflects when diagnosis was documented in both MDM as applicable and the Disposition within this note       Time User Action Codes Description Comment    3/19/2025  7:10 PM Mckenna Lala Add [B37.2] Candidal skin infection     3/19/2025  7:10 PM Mckenna Lala Add [L03.311] Cellulitis of abdominal wall           ED Disposition       ED Disposition   Discharge    Condition   Stable    Date/Time   Wed Mar 19, 2025  7:10 PM    Comment   Aicha Vasquez discharge to home/self care.                   Assessment & Plan       Medical Decision Making  Wound cultured. Will treat with Bactrim pending sensitivities. Will also treat the fungal infection with Nystatin powder. Discussed supportive care. Discussed preventative care. No tracking of the ulcer/wound.   Pt stable at time of discharge, vital signs reviewed, questions answered. Strict ER return precautions provided/discussed and were well understood by patient. Patient's vitals, labs and/or imaging results, diagnosis, and treatment plan were discussed with the patient. All new and/or changed medications were discussed - specifically to include route of administration, how often to take, when to take, and the pharmacy they were sent to. Strict return precautions as well as close follow up with PCP was discussed with the patient and the patient was agreeable to my recommendations.  Patient verbally acknowledged understanding. All labs, imaging were reviewed and used in the medical decision making process (if ordered).     Portions of this chart may have been written with voice recognition software.  Occasional grammatical errors, wrong word or \"sound a like\" substitutions may have occurred due to software limitations.  Please read carefully and use context to recognize where substitutions have occurred.      Problems Addressed:  Candidal skin infection: acute illness or injury  Cellulitis of abdominal wall: acute illness or injury    Amount and/or Complexity of Data " "Reviewed  Labs: ordered.    Risk  Prescription drug management.             Medications - No data to display    ED Risk Strat Scores                            SBIRT 22yo+      Flowsheet Row Most Recent Value   Initial Alcohol Screen: US AUDIT-C     1. How often do you have a drink containing alcohol? 0 Filed at: 2025   2. How many drinks containing alcohol do you have on a typical day you are drinking?  0 Filed at: 2025   3b. FEMALE Any Age, or MALE 65+: How often do you have 4 or more drinks on one occassion? 0 Filed at: 2025   Audit-C Score 0 Filed at: 2025   DENNY: How many times in the past year have you...    Used an illegal drug or used a prescription medication for non-medical reasons? Never Filed at: 2025                            History of Present Illness       Chief Complaint   Patient presents with    Wound Check     Pt was checking under her belly and noticed a \"hole\" in her skin. Noticed green discharge and foul odor.  Pt c/o fatigue and chills. No meds pta.       Past Medical History:   Diagnosis Date    Asthma     Seasonal allergies       Past Surgical History:   Procedure Laterality Date     SECTION      five     SECTION      CHOLECYSTECTOMY      GASTRECTOMY SLEEVE LAPAROSCOPIC  10/12/2021    TONSILLECTOMY        History reviewed. No pertinent family history.   Social History     Tobacco Use    Smoking status: Every Day     Current packs/day: 0.25     Types: Cigarettes    Smokeless tobacco: Never   Vaping Use    Vaping status: Never Used   Substance Use Topics    Alcohol use: Yes     Comment: occassionally    Drug use: Yes     Types: Marijuana     Comment: medical      E-Cigarette/Vaping    E-Cigarette Use Never User       E-Cigarette/Vaping Substances      I have reviewed and agree with the history as documented.     Aicha is a 39 year old female presenting to the ED for a wound she found earlier today on her lower abdomen. " There is a painful red beefy rash under her panis with a small wound with purulent discharge. Used a powder for the fungal infection.         Review of Systems   Respiratory:  Negative for cough and shortness of breath.    Cardiovascular:  Negative for chest pain and palpitations.   Skin:  Positive for wound.   Neurological:  Negative for light-headedness and headaches.           Objective       ED Triage Vitals [03/19/25 1856]   Temperature Pulse Blood Pressure Respirations SpO2 Patient Position - Orthostatic VS   99 °F (37.2 °C) 93 131/73 20 99 % Sitting      Temp Source Heart Rate Source BP Location FiO2 (%) Pain Score    Oral Monitor Left arm -- --      Vitals      Date and Time Temp Pulse SpO2 Resp BP Pain Score FACES Pain Rating User   03/19/25 1856 99 °F (37.2 °C) 93 99 % 20 131/73 -- -- KR            Physical Exam  Vitals reviewed.   Constitutional:       General: She is not in acute distress.     Appearance: Normal appearance. She is not ill-appearing or toxic-appearing.   HENT:      Head: Normocephalic and atraumatic.      Right Ear: External ear normal.      Left Ear: External ear normal.   Cardiovascular:      Rate and Rhythm: Normal rate.      Pulses: Normal pulses.   Pulmonary:      Effort: Pulmonary effort is normal. No respiratory distress.   Musculoskeletal:      Cervical back: Normal range of motion.   Skin:     General: Skin is warm and dry.      Capillary Refill: Capillary refill takes less than 2 seconds.      Comments: Under the abdominal panis there is a candidal infection (erythematous, beefy rash) with a small area <1 cm consistent with ulcer vs cellulitis of the skin. No current purulent discharge.   Neurological:      General: No focal deficit present.      Mental Status: She is alert.   Psychiatric:         Mood and Affect: Mood normal.         Behavior: Behavior normal.         Results Reviewed       Procedure Component Value Units Date/Time    Wound culture and Gram stain [[id number]]  Collected: 25    Lab Status: In process Specimen: Wound from Abdominal Updated: 25            No orders to display       Procedures    ED Medication and Procedure Management   Prior to Admission Medications   Prescriptions Last Dose Informant Patient Reported? Taking?   albuterol (2.5 mg/3 mL) 0.083 % nebulizer solution   No No   Sig: Take 3 mL (2.5 mg total) by nebulization every 6 (six) hours as needed for wheezing   albuterol (ProAir HFA) 90 mcg/act inhaler   No No   Sig: Inhale 2 puffs every 6 (six) hours as needed for wheezing   albuterol (ProAir HFA) 90 mcg/act inhaler   No No   Sig: Inhale 2 puffs every 4 (four) hours as needed for wheezing or shortness of breath   calcium carbonate (OS-KARINA) 600 MG tablet  Self Yes No   Sig: Take 600 mg by mouth daily   fluticasone (FLONASE) 50 mcg/act nasal spray   No No   Si sprays into each nostril daily for 10 days   loperamide (IMODIUM) 2 mg capsule   No No   Sig: Take 1 capsule (2 mg total) by mouth 4 (four) times a day as needed for diarrhea   menthol-cetylpyridinium (CEPACOL) 3 MG lozenge   No No   Sig: Take 1 lozenge (3 mg total) by mouth as needed for sore throat      Facility-Administered Medications: None     Discharge Medication List as of 3/19/2025  7:11 PM        START taking these medications    Details   nystatin (MYCOSTATIN) powder Apply topically 3 (three) times a day, Starting Wed 3/19/2025, Normal      sulfamethoxazole-trimethoprim (BACTRIM DS) 800-160 mg per tablet Take 1 tablet by mouth 2 (two) times a day for 7 days smx-tmp DS (BACTRIM) 800-160 mg tabs (1tab q12 D10), Starting Wed 3/19/2025, Until Wed 3/26/2025, Normal           CONTINUE these medications which have NOT CHANGED    Details   albuterol (2.5 mg/3 mL) 0.083 % nebulizer solution Take 3 mL (2.5 mg total) by nebulization every 6 (six) hours as needed for wheezing, Starting 2023, Normal      !! albuterol (ProAir HFA) 90 mcg/act inhaler Inhale 2 puffs every 6  (six) hours as needed for wheezing, Starting Fri 2/23/2024, Normal      !! albuterol (ProAir HFA) 90 mcg/act inhaler Inhale 2 puffs every 4 (four) hours as needed for wheezing or shortness of breath, Starting Wed 9/18/2024, Normal      calcium carbonate (OS-KARINA) 600 MG tablet Take 600 mg by mouth daily, Historical Med      fluticasone (FLONASE) 50 mcg/act nasal spray 2 sprays into each nostril daily for 10 days, Starting Fri 2/23/2024, Until Mon 3/4/2024, Normal      loperamide (IMODIUM) 2 mg capsule Take 1 capsule (2 mg total) by mouth 4 (four) times a day as needed for diarrhea, Starting Fri 2/23/2024, Normal      menthol-cetylpyridinium (CEPACOL) 3 MG lozenge Take 1 lozenge (3 mg total) by mouth as needed for sore throat, Starting Fri 2/23/2024, Normal       !! - Potential duplicate medications found. Please discuss with provider.        No discharge procedures on file.  ED SEPSIS DOCUMENTATION   Time reflects when diagnosis was documented in both MDM as applicable and the Disposition within this note       Time User Action Codes Description Comment    3/19/2025  7:10 PM Mckenna Lala [B37.2] Candidal skin infection     3/19/2025  7:10 PM Mckenna Lala [L03.311] Cellulitis of abdominal wall                  Mckenna Lala PA-C  03/19/25 1939

## 2025-03-19 NOTE — DISCHARGE INSTRUCTIONS
Apply the nystatin powder three times a day to treat the yeast infection    Take the antibiotic twice daily for seven days

## 2025-03-21 ENCOUNTER — RESULTS FOLLOW-UP (OUTPATIENT)
Dept: EMERGENCY DEPT | Facility: HOSPITAL | Age: 40
End: 2025-03-21

## 2025-03-23 LAB
BACTERIA WND AEROBE CULT: ABNORMAL
BACTERIA WND AEROBE CULT: ABNORMAL
GRAM STN SPEC: ABNORMAL

## 2025-04-19 ENCOUNTER — HOSPITAL ENCOUNTER (EMERGENCY)
Facility: HOSPITAL | Age: 40
Discharge: HOME | End: 2025-04-19
Attending: EMERGENCY MEDICINE
Payer: MEDICARE

## 2025-04-19 ENCOUNTER — APPOINTMENT (EMERGENCY)
Dept: RADIOLOGY | Facility: HOSPITAL | Age: 40
End: 2025-04-19
Payer: MEDICARE

## 2025-04-19 VITALS
BODY MASS INDEX: 68.2 KG/M2 | RESPIRATION RATE: 20 BRPM | SYSTOLIC BLOOD PRESSURE: 127 MMHG | OXYGEN SATURATION: 98 % | TEMPERATURE: 98.4 F | DIASTOLIC BLOOD PRESSURE: 80 MMHG | HEART RATE: 86 BPM | WEIGHT: 293 LBS

## 2025-04-19 DIAGNOSIS — R07.9 CHEST PAIN, UNSPECIFIED: Primary | ICD-10-CM

## 2025-04-19 DIAGNOSIS — R68.89 FLU-LIKE SYMPTOMS: ICD-10-CM

## 2025-04-19 LAB
ALBUMIN SERPL BCG-MCNC: 3.6 G/DL (ref 3.5–5)
ALP SERPL-CCNC: 100 U/L (ref 34–104)
ALT SERPL W P-5'-P-CCNC: 26 U/L (ref 7–52)
ANION GAP SERPL CALCULATED.3IONS-SCNC: 5 MMOL/L (ref 4–13)
AST SERPL W P-5'-P-CCNC: 25 U/L (ref 13–39)
BASOPHILS # BLD AUTO: 0.02 THOUSANDS/ÂΜL (ref 0–0.1)
BASOPHILS NFR BLD AUTO: 0 % (ref 0–1)
BILIRUB SERPL-MCNC: 0.29 MG/DL (ref 0.2–1)
BUN SERPL-MCNC: 13 MG/DL (ref 5–25)
CALCIUM SERPL-MCNC: 8.3 MG/DL (ref 8.4–10.2)
CARDIAC TROPONIN I PNL SERPL HS: <2 NG/L (ref ?–50)
CHLORIDE SERPL-SCNC: 106 MMOL/L (ref 96–108)
CO2 SERPL-SCNC: 27 MMOL/L (ref 21–32)
CREAT SERPL-MCNC: 0.63 MG/DL (ref 0.6–1.3)
D DIMER PPP FEU-MCNC: 0.38 UG/ML FEU
EOSINOPHIL # BLD AUTO: 0.11 THOUSAND/ÂΜL (ref 0–0.61)
EOSINOPHIL NFR BLD AUTO: 1 % (ref 0–6)
ERYTHROCYTE [DISTWIDTH] IN BLOOD BY AUTOMATED COUNT: 14.8 % (ref 11.6–15.1)
GFR SERPL CREATININE-BSD FRML MDRD: 111 ML/MIN/1.73SQ M
GLUCOSE SERPL-MCNC: 96 MG/DL (ref 65–140)
HCT VFR BLD AUTO: 41.3 % (ref 34.8–46.1)
HGB BLD-MCNC: 13.2 G/DL (ref 11.5–15.4)
IMM GRANULOCYTES # BLD AUTO: 0.03 THOUSAND/UL (ref 0–0.2)
IMM GRANULOCYTES NFR BLD AUTO: 0 % (ref 0–2)
LIPASE SERPL-CCNC: 24 U/L (ref 11–82)
LYMPHOCYTES # BLD AUTO: 2.66 THOUSANDS/ÂΜL (ref 0.6–4.47)
LYMPHOCYTES NFR BLD AUTO: 24 % (ref 14–44)
MCH RBC QN AUTO: 29.3 PG (ref 26.8–34.3)
MCHC RBC AUTO-ENTMCNC: 32 G/DL (ref 31.4–37.4)
MCV RBC AUTO: 92 FL (ref 82–98)
MONOCYTES # BLD AUTO: 0.77 THOUSAND/ÂΜL (ref 0.17–1.22)
MONOCYTES NFR BLD AUTO: 7 % (ref 4–12)
NEUTROPHILS # BLD AUTO: 7.62 THOUSANDS/ÂΜL (ref 1.85–7.62)
NEUTS SEG NFR BLD AUTO: 68 % (ref 43–75)
NRBC BLD AUTO-RTO: 0 /100 WBCS
PLATELET # BLD AUTO: 261 THOUSANDS/UL (ref 149–390)
PMV BLD AUTO: 11.9 FL (ref 8.9–12.7)
POTASSIUM SERPL-SCNC: 4.2 MMOL/L (ref 3.5–5.3)
PROT SERPL-MCNC: 6.3 G/DL (ref 6.4–8.4)
RBC # BLD AUTO: 4.5 MILLION/UL (ref 3.81–5.12)
SODIUM SERPL-SCNC: 138 MMOL/L (ref 135–147)
WBC # BLD AUTO: 11.21 THOUSAND/UL (ref 4.31–10.16)

## 2025-04-19 PROCEDURE — 84484 ASSAY OF TROPONIN QUANT: CPT | Performed by: PHYSICIAN ASSISTANT

## 2025-04-19 PROCEDURE — 85379 FIBRIN DEGRADATION QUANT: CPT | Performed by: PHYSICIAN ASSISTANT

## 2025-04-19 PROCEDURE — 99285 EMERGENCY DEPT VISIT HI MDM: CPT | Performed by: PHYSICIAN ASSISTANT

## 2025-04-19 PROCEDURE — 71046 X-RAY EXAM CHEST 2 VIEWS: CPT

## 2025-04-19 PROCEDURE — 99285 EMERGENCY DEPT VISIT HI MDM: CPT

## 2025-04-19 PROCEDURE — 80053 COMPREHEN METABOLIC PANEL: CPT | Performed by: PHYSICIAN ASSISTANT

## 2025-04-19 PROCEDURE — 83690 ASSAY OF LIPASE: CPT | Performed by: PHYSICIAN ASSISTANT

## 2025-04-19 PROCEDURE — 36415 COLL VENOUS BLD VENIPUNCTURE: CPT | Performed by: PHYSICIAN ASSISTANT

## 2025-04-19 PROCEDURE — 85025 COMPLETE CBC W/AUTO DIFF WBC: CPT | Performed by: PHYSICIAN ASSISTANT

## 2025-04-19 RX ORDER — LORATADINE 10 MG/1
10 TABLET ORAL DAILY
Qty: 30 TABLET | Refills: 0 | Status: SHIPPED | OUTPATIENT
Start: 2025-04-19

## 2025-04-19 RX ORDER — ACETAMINOPHEN 500 MG
500 TABLET ORAL EVERY 6 HOURS PRN
Qty: 30 TABLET | Refills: 0 | Status: SHIPPED | OUTPATIENT
Start: 2025-04-19

## 2025-04-19 RX ORDER — FLUTICASONE PROPIONATE 50 MCG
1 SPRAY, SUSPENSION (ML) NASAL DAILY
Qty: 16 G | Refills: 0 | Status: SHIPPED | OUTPATIENT
Start: 2025-04-19

## 2025-04-19 NOTE — ED PROVIDER NOTES
"Time reflects when diagnosis was documented in both MDM as applicable and the Disposition within this note       Time User Action Codes Description Comment    4/19/2025 12:29 PM Vannesa Linares Add [R07.9] Chest pain, unspecified     4/19/2025  1:22 PM Vannesa Linares Add [R68.89] Flu-like symptoms           ED Disposition       ED Disposition   Discharge    Condition   Good    Date/Time   Sat Apr 19, 2025 12:29 PM    Comment   Aicha Vasquez discharge to home/self care.                   Assessment & Plan       Medical Decision Making  39-year-old female current everyday smoker presenting for evaluation of chest pain which is sharp and stabbing radiates to her left arm.  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    Physical exam is reassuring and unremarkable with stable vital signs.  Differential diagnosis includes but is not limited to pneumonia, PE, pneumothorax, ACS, dysrhythmia, anemia, metabolic disturbance and others.  Workup demonstrates unremarkable blood work, chest x-ray my interpretation without acute cardiopulmonary abnormalities  Heart score is 0.  Patient does not meet PERC criteria for which reason D-dimer was obtained noted to be negative no negation for CT PE study at this time  EKG on my interpretation shows no ischemic changes nor dysrhythmia    All imaging and/or lab testing discussed with patient, strict return to ED precautions discussed. Patient recommended to follow up promptly with appropriate outpatient provider.Patient and/or family members verbalizes understanding and agrees with plan. Patient is stable for discharge.     Portions of the record may have been created with voice recognition software. Occasional wrong word or \"sound a like\" substitutions may have occurred due to the inherent limitations of voice recognition software. Read the chart carefully and recognize, using context, where substitutions have occurred.      Amount " and/or Complexity of Data Reviewed  Labs: ordered. Decision-making details documented in ED Course.  Radiology: ordered and independent interpretation performed.    Risk  OTC drugs.        ED Course as of 25 1323   Sat 2025   1319 D-Dimer, Quant: 0.38       Medications - No data to display    ED Risk Strat Scores      HEART Risk Score      Flowsheet Row Most Recent Value   Heart Score Risk Calculator    History 0 Filed at: 2025 1322   ECG 0 Filed at: 2025 1322   Age 0 Filed at: 2025 1322   Risk Factors 0 Filed at: 2025 1322   Troponin 0 Filed at: 2025 1322   HEART Score 0 Filed at: 2025 1322                      No data recorded        SBIRT 22yo+      Flowsheet Row Most Recent Value   Initial Alcohol Screen: US AUDIT-C     1. How often do you have a drink containing alcohol? 0 Filed at: 2025 1215   2. How many drinks containing alcohol do you have on a typical day you are drinking?  0 Filed at: 2025 1215   3b. FEMALE Any Age, or MALE 65+: How often do you have 4 or more drinks on one occassion? 0 Filed at: 2025 1215   Audit-C Score 0 Filed at: 2025 1215   DENNY: How many times in the past year have you...    Used an illegal drug or used a prescription medication for non-medical reasons? Never Filed at: 2025 1215                            History of Present Illness       Chief Complaint   Patient presents with    Chest Pain     States she woke up at about 0330 with chest pain and headache. States she took a shower that helped with the pain. States she went to sleep again and then woke up with the pain again along with left arm pain       Past Medical History:   Diagnosis Date    Asthma     Seasonal allergies       Past Surgical History:   Procedure Laterality Date     SECTION      five     SECTION      CHOLECYSTECTOMY      GASTRECTOMY SLEEVE LAPAROSCOPIC  10/12/2021    TONSILLECTOMY        History reviewed. No pertinent  family history.   Social History     Tobacco Use    Smoking status: Every Day     Current packs/day: 0.25     Types: Cigarettes    Smokeless tobacco: Never   Vaping Use    Vaping status: Never Used   Substance Use Topics    Alcohol use: Yes     Comment: occassionally    Drug use: Yes     Types: Marijuana     Comment: medical      E-Cigarette/Vaping    E-Cigarette Use Never User       E-Cigarette/Vaping Substances      I have reviewed and agree with the history as documented.     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  She denies any abdominal pain, nausea vomiting or diarrhea.  She reports palpitations states she is under stress and denies any pleuritic nature to her chest pain.  She reports it is a sharp stabbing sensation which does radiate to her left arm.      Chest Pain  Associated symptoms: cough and fatigue    Associated symptoms: no abdominal pain, no dizziness, no fever, no nausea, no numbness, no palpitations, no shortness of breath and not vomiting        Review of Systems   Constitutional:  Positive for chills and fatigue. Negative for fever.   HENT:  Positive for congestion, postnasal drip, rhinorrhea and sore throat. Negative for ear pain.    Eyes:  Negative for redness.   Respiratory:  Positive for cough. Negative for chest tightness and shortness of breath.    Cardiovascular:  Positive for chest pain. Negative for palpitations.   Gastrointestinal:  Negative for abdominal pain, nausea and vomiting.   Genitourinary:  Negative for dysuria and hematuria.   Musculoskeletal: Negative.    Skin:  Negative for rash.   Neurological:  Negative for dizziness, syncope, light-headedness and numbness.           Objective       ED Triage Vitals [04/19/25 1213]   Temperature Pulse Blood Pressure Respirations SpO2 Patient Position - Orthostatic  VS   98.4 °F (36.9 °C) 86 127/80 20 98 % Sitting      Temp Source Heart Rate Source BP Location FiO2 (%) Pain Score    Oral Monitor Left arm -- --      Vitals      Date and Time Temp Pulse SpO2 Resp BP Pain Score FACES Pain Rating User   04/19/25 1213 98.4 °F (36.9 °C) 86 98 % 20 127/80 -- -- FK            Physical Exam  Vitals and nursing note reviewed.   Constitutional:       Appearance: She is well-developed.   HENT:      Head: Normocephalic.      Mouth/Throat:      Comments: Clear oropharynx and posterior oropharynx, no sublingual or submandibular swelling or hypertrophy appreciated.  Patient with a midline uvula.  No tonsillar swelling or exudate appreciated.  Patient is managing oral secretions without difficulty.  No phonation changes.  No submandibular or cervical lymphadenopathy appreciated.    Eyes:      General: No scleral icterus.  Cardiovascular:      Rate and Rhythm: Normal rate and regular rhythm.   Pulmonary:      Effort: Pulmonary effort is normal.      Breath sounds: Normal breath sounds. No stridor.      Comments:  Patient in no respiratory distress, speaking in full sentences, managing oral secretions without difficulty, no accessory muscle use, retractions, or belly breathing noted, no adventitious lung sounds auscultated bilaterally.  Chest:      Comments: Patient is without crepitus, subcutaneous emphysema or reproducible pain on palpation of the chest.  Equal chest rise and fall is appreciated.  Abdominal:      General: There is no distension.      Palpations: Abdomen is soft.      Tenderness: There is no abdominal tenderness.   Musculoskeletal:         General: Normal range of motion.   Skin:     General: Skin is warm and dry.      Capillary Refill: Capillary refill takes less than 2 seconds.   Neurological:      Mental Status: She is alert and oriented to person, place, and time.         Results Reviewed       Procedure Component Value Units Date/Time    HS Troponin 0hr (reflex protocol)  [[id number]]  (Normal) Collected: 04/19/25 1245    Lab Status: Final result Specimen: Blood from Arm, Right Updated: 04/19/25 1317     hs TnI 0hr <2 ng/L     Comprehensive metabolic panel [[id number]]  (Abnormal) Collected: 04/19/25 1244    Lab Status: Final result Specimen: Blood from Arm, Right Updated: 04/19/25 1313     Sodium 138 mmol/L      Potassium 4.2 mmol/L      Chloride 106 mmol/L      CO2 27 mmol/L      ANION GAP 5 mmol/L      BUN 13 mg/dL      Creatinine 0.63 mg/dL      Glucose 96 mg/dL      Calcium 8.3 mg/dL      AST 25 U/L      ALT 26 U/L      Alkaline Phosphatase 100 U/L      Total Protein 6.3 g/dL      Albumin 3.6 g/dL      Total Bilirubin 0.29 mg/dL      eGFR 111 ml/min/1.73sq m     Narrative:      [employer] guidelines for Chronic Kidney Disease (CKD):     Stage 1 with normal or high GFR (GFR > 90 mL/min/1.73 square meters)    Stage 2 Mild CKD (GFR = 60-89 mL/min/1.73 square meters)    Stage 3A Moderate CKD (GFR = 45-59 mL/min/1.73 square meters)    Stage 3B Moderate CKD (GFR = 30-44 mL/min/1.73 square meters)    Stage 4 Severe CKD (GFR = 15-29 mL/min/1.73 square meters)    Stage 5 End Stage CKD (GFR <15 mL/min/1.73 square meters)  Note: GFR calculation is accurate only with a steady state creatinine    Lipase [[id number]]  (Normal) Collected: 04/19/25 1244    Lab Status: Final result Specimen: Blood from Arm, Right Updated: 04/19/25 1313     Lipase 24 u/L     D-dimer, quantitative [[id number]]  (Normal) Collected: 04/19/25 1244    Lab Status: Final result Specimen: Blood from Arm, Right Updated: 04/19/25 1309     D-Dimer, Quant 0.38 ug/ml FEU     CBC and differential [[id number]]  (Abnormal) Collected: 04/19/25 1244    Lab Status: Final result Specimen: Blood from Arm, Right Updated: 04/19/25 1250     WBC 11.21 Thousand/uL      RBC 4.50 Million/uL      Hemoglobin 13.2 g/dL      Hematocrit 41.3 %      MCV 92 fL      MCH 29.3 pg      MCHC 32.0 g/dL      RDW 14.8 %      MPV 11.9 fL       Platelets 261 Thousands/uL      nRBC 0 /100 WBCs      Segmented % 68 %      Immature Grans % 0 %      Lymphocytes % 24 %      Monocytes % 7 %      Eosinophils Relative 1 %      Basophils Relative 0 %      Absolute Neutrophils 7.62 Thousands/µL      Absolute Immature Grans 0.03 Thousand/uL      Absolute Lymphocytes 2.66 Thousands/µL      Absolute Monocytes 0.77 Thousand/µL      Eosinophils Absolute 0.11 Thousand/µL      Basophils Absolute 0.02 Thousands/µL             XR chest 2 views   ED Interpretation by Vannesa Linares PA-C (04/19 1320)   No acute cardiopulmonary abnormalities noted          ECG 12 Lead Documentation Only    Date/Time: 4/19/2025 12:28 PM    Performed by: Vannesa Linares PA-C  Authorized by: Vannesa Linares PA-C    Indications / Diagnosis:  Chest pain  ECG reviewed by me, the ED Provider: yes    Patient location:  ED  Previous ECG:     Comparison to cardiac monitor: Yes    Interpretation:     Interpretation: normal    Rate:     ECG rate:  87    ECG rate assessment: normal    Rhythm:     Rhythm: sinus rhythm    Ectopy:     Ectopy: none    QRS:     QRS axis:  Normal    QRS intervals:  Normal  Conduction:     Conduction: normal    ST segments:     ST segments:  Normal  T waves:     T waves: normal    Comments:        QRS 80          ED Medication and Procedure Management   Prior to Admission Medications   Prescriptions Last Dose Informant Patient Reported? Taking?   albuterol (2.5 mg/3 mL) 0.083 % nebulizer solution   No No   Sig: Take 3 mL (2.5 mg total) by nebulization every 6 (six) hours as needed for wheezing   albuterol (ProAir HFA) 90 mcg/act inhaler   No No   Sig: Inhale 2 puffs every 6 (six) hours as needed for wheezing   albuterol (ProAir HFA) 90 mcg/act inhaler   No No   Sig: Inhale 2 puffs every 4 (four) hours as needed for wheezing or shortness of breath   calcium carbonate (OS-KARINA) 600 MG tablet  Self Yes No   Sig: Take 600 mg by  mouth daily   fluticasone (FLONASE) 50 mcg/act nasal spray   No No   Si sprays into each nostril daily for 10 days   loperamide (IMODIUM) 2 mg capsule   No No   Sig: Take 1 capsule (2 mg total) by mouth 4 (four) times a day as needed for diarrhea   menthol-cetylpyridinium (CEPACOL) 3 MG lozenge   No No   Sig: Take 1 lozenge (3 mg total) by mouth as needed for sore throat   nystatin (MYCOSTATIN) powder   No No   Sig: Apply topically 3 (three) times a day      Facility-Administered Medications: None     Patient's Medications   Discharge Prescriptions    ACETAMINOPHEN (TYLENOL) 500 MG TABLET    Take 1 tablet (500 mg total) by mouth every 6 (six) hours as needed for mild pain       Start Date: 2025 End Date: --       Order Dose: 500 mg       Quantity: 30 tablet    Refills: 0    FLUTICASONE (FLONASE) 50 MCG/ACT NASAL SPRAY    1 spray into each nostril daily       Start Date: 2025 End Date: --       Order Dose: 1 spray       Quantity: 16 g    Refills: 0    LORATADINE (CLARITIN) 10 MG TABLET    Take 1 tablet (10 mg total) by mouth daily       Start Date: 2025 End Date: --       Order Dose: 10 mg       Quantity: 30 tablet    Refills: 0    MENTHOL-CETYLPYRIDINIUM (CEPACOL) 3 MG LOZENGE    Take 1 lozenge (3 mg total) by mouth as needed for sore throat       Start Date: 2025 End Date: --       Order Dose: 3 mg       Quantity: 30 lozenge    Refills: 0     No discharge procedures on file.  ED SEPSIS DOCUMENTATION   Time reflects when diagnosis was documented in both MDM as applicable and the Disposition within this note       Time User Action Codes Description Comment    2025 12:29 PM Vannesa Linares [R07.9] Chest pain, unspecified     2025  1:22 PM Vannesa Linares [R68.89] Flu-like symptoms                  Vannesa Linares PA-C  [phone]

## 2025-04-19 NOTE — Clinical Note
Aicha Vasquez was seen and treated in our emergency department on 4/19/2025.                Diagnosis:     Aicha  may return to work on return date.    She may return on this date: 04/21/2025         If you have any questions or concerns, please don't hesitate to call.      Vannesa Linares PA-C    ______________________________           _______________          _______________  Hospital Representative                              Date                                Time

## 2025-07-15 ENCOUNTER — HOSPITAL ENCOUNTER (EMERGENCY)
Facility: HOSPITAL | Age: 40
Discharge: HOME | End: 2025-07-15
Attending: EMERGENCY MEDICINE | Admitting: EMERGENCY MEDICINE
Payer: MEDICARE

## 2025-07-15 ENCOUNTER — APPOINTMENT (EMERGENCY)
Dept: RADIOLOGY | Facility: HOSPITAL | Age: 40
End: 2025-07-15
Payer: MEDICARE

## 2025-07-15 VITALS
BODY MASS INDEX: 68.57 KG/M2 | OXYGEN SATURATION: 96 % | RESPIRATION RATE: 18 BRPM | SYSTOLIC BLOOD PRESSURE: 122 MMHG | HEART RATE: 74 BPM | DIASTOLIC BLOOD PRESSURE: 89 MMHG | TEMPERATURE: 98.4 F | WEIGHT: 293 LBS

## 2025-07-15 DIAGNOSIS — M79.89 LEG SWELLING: ICD-10-CM

## 2025-07-15 DIAGNOSIS — R07.9 CHEST PAIN: ICD-10-CM

## 2025-07-15 DIAGNOSIS — R06.01 ORTHOPNEA: ICD-10-CM

## 2025-07-15 DIAGNOSIS — E87.70 VOLUME OVERLOAD: Primary | ICD-10-CM

## 2025-07-15 LAB
ALBUMIN SERPL BCG-MCNC: 3.8 G/DL (ref 3.5–5)
ALP SERPL-CCNC: 108 U/L (ref 34–104)
ALT SERPL W P-5'-P-CCNC: 21 U/L (ref 7–52)
ANION GAP SERPL CALCULATED.3IONS-SCNC: 7 MMOL/L (ref 4–13)
AST SERPL W P-5'-P-CCNC: 18 U/L (ref 13–39)
BACTERIA UR QL AUTO: ABNORMAL /HPF
BASOPHILS # BLD AUTO: 0.03 THOUSANDS/ÂΜL (ref 0–0.1)
BASOPHILS NFR BLD AUTO: 0 % (ref 0–1)
BILIRUB SERPL-MCNC: 0.26 MG/DL (ref 0.2–1)
BILIRUB UR QL STRIP: NEGATIVE
BNP SERPL-MCNC: 22 PG/ML (ref 0–100)
BUN SERPL-MCNC: 9 MG/DL (ref 5–25)
CALCIUM SERPL-MCNC: 8.9 MG/DL (ref 8.4–10.2)
CARDIAC TROPONIN I PNL SERPL HS: <2 NG/L (ref ?–50)
CHLORIDE SERPL-SCNC: 106 MMOL/L (ref 96–108)
CLARITY UR: CLEAR
CO2 SERPL-SCNC: 28 MMOL/L (ref 21–32)
COLOR UR: ABNORMAL
CREAT SERPL-MCNC: 0.58 MG/DL (ref 0.6–1.3)
D DIMER PPP FEU-MCNC: 0.41 UG/ML FEU
EOSINOPHIL # BLD AUTO: 0.11 THOUSAND/ÂΜL (ref 0–0.61)
EOSINOPHIL NFR BLD AUTO: 1 % (ref 0–6)
ERYTHROCYTE [DISTWIDTH] IN BLOOD BY AUTOMATED COUNT: 14.8 % (ref 11.6–15.1)
GFR SERPL CREATININE-BSD FRML MDRD: 114 ML/MIN/1.73SQ M
GLUCOSE SERPL-MCNC: 115 MG/DL (ref 65–140)
GLUCOSE UR STRIP-MCNC: NEGATIVE MG/DL
HCT VFR BLD AUTO: 40.9 % (ref 34.8–46.1)
HGB BLD-MCNC: 12.9 G/DL (ref 11.5–15.4)
HGB UR QL STRIP.AUTO: 10
IMM GRANULOCYTES # BLD AUTO: 0.03 THOUSAND/UL (ref 0–0.2)
IMM GRANULOCYTES NFR BLD AUTO: 0 % (ref 0–2)
KETONES UR STRIP-MCNC: NEGATIVE MG/DL
LEUKOCYTE ESTERASE UR QL STRIP: 25
LYMPHOCYTES # BLD AUTO: 3.32 THOUSANDS/ÂΜL (ref 0.6–4.47)
LYMPHOCYTES NFR BLD AUTO: 33 % (ref 14–44)
MAGNESIUM SERPL-MCNC: 1.8 MG/DL (ref 1.9–2.7)
MCH RBC QN AUTO: 28.9 PG (ref 26.8–34.3)
MCHC RBC AUTO-ENTMCNC: 31.5 G/DL (ref 31.4–37.4)
MCV RBC AUTO: 92 FL (ref 82–98)
MONOCYTES # BLD AUTO: 0.72 THOUSAND/ÂΜL (ref 0.17–1.22)
MONOCYTES NFR BLD AUTO: 7 % (ref 4–12)
MUCOUS THREADS UR QL AUTO: ABNORMAL
NEUTROPHILS # BLD AUTO: 5.77 THOUSANDS/ÂΜL (ref 1.85–7.62)
NEUTS SEG NFR BLD AUTO: 59 % (ref 43–75)
NITRITE UR QL STRIP: NEGATIVE
NON-SQ EPI CELLS URNS QL MICRO: ABNORMAL /HPF
NRBC BLD AUTO-RTO: 0 /100 WBCS
PH UR STRIP.AUTO: 6 [PH]
PLATELET # BLD AUTO: 246 THOUSANDS/UL (ref 149–390)
PMV BLD AUTO: 12 FL (ref 8.9–12.7)
POTASSIUM SERPL-SCNC: 3.3 MMOL/L (ref 3.5–5.3)
PROT SERPL-MCNC: 6.4 G/DL (ref 6.4–8.4)
PROT UR STRIP-MCNC: NEGATIVE MG/DL
RBC # BLD AUTO: 4.47 MILLION/UL (ref 3.81–5.12)
RBC #/AREA URNS AUTO: ABNORMAL /HPF
SODIUM SERPL-SCNC: 141 MMOL/L (ref 135–147)
SP GR UR STRIP.AUTO: 1.02 (ref 1–1.04)
UROBILINOGEN UA: NEGATIVE MG/DL
WBC # BLD AUTO: 9.98 THOUSAND/UL (ref 4.31–10.16)
WBC #/AREA URNS AUTO: ABNORMAL /HPF

## 2025-07-15 PROCEDURE — 96365 THER/PROPH/DIAG IV INF INIT: CPT

## 2025-07-15 PROCEDURE — 96375 TX/PRO/DX INJ NEW DRUG ADDON: CPT

## 2025-07-15 PROCEDURE — 83735 ASSAY OF MAGNESIUM: CPT | Performed by: EMERGENCY MEDICINE

## 2025-07-15 PROCEDURE — 99285 EMERGENCY DEPT VISIT HI MDM: CPT | Performed by: EMERGENCY MEDICINE

## 2025-07-15 PROCEDURE — 96366 THER/PROPH/DIAG IV INF ADDON: CPT

## 2025-07-15 PROCEDURE — 99285 EMERGENCY DEPT VISIT HI MDM: CPT

## 2025-07-15 PROCEDURE — 83880 ASSAY OF NATRIURETIC PEPTIDE: CPT | Performed by: EMERGENCY MEDICINE

## 2025-07-15 PROCEDURE — 71045 X-RAY EXAM CHEST 1 VIEW: CPT

## 2025-07-15 PROCEDURE — 36415 COLL VENOUS BLD VENIPUNCTURE: CPT | Performed by: EMERGENCY MEDICINE

## 2025-07-15 PROCEDURE — 85379 FIBRIN DEGRADATION QUANT: CPT | Performed by: EMERGENCY MEDICINE

## 2025-07-15 PROCEDURE — 93005 ELECTROCARDIOGRAM TRACING: CPT

## 2025-07-15 PROCEDURE — 84484 ASSAY OF TROPONIN QUANT: CPT | Performed by: EMERGENCY MEDICINE

## 2025-07-15 PROCEDURE — 80053 COMPREHEN METABOLIC PANEL: CPT | Performed by: EMERGENCY MEDICINE

## 2025-07-15 PROCEDURE — 81001 URINALYSIS AUTO W/SCOPE: CPT | Performed by: EMERGENCY MEDICINE

## 2025-07-15 PROCEDURE — 85025 COMPLETE CBC W/AUTO DIFF WBC: CPT | Performed by: EMERGENCY MEDICINE

## 2025-07-15 RX ORDER — FUROSEMIDE 10 MG/ML
20 INJECTION INTRAMUSCULAR; INTRAVENOUS ONCE
Status: COMPLETED | OUTPATIENT
Start: 2025-07-15 | End: 2025-07-15

## 2025-07-15 RX ORDER — MAGNESIUM SULFATE HEPTAHYDRATE 40 MG/ML
2 INJECTION, SOLUTION INTRAVENOUS ONCE
Status: COMPLETED | OUTPATIENT
Start: 2025-07-15 | End: 2025-07-15

## 2025-07-15 RX ORDER — FUROSEMIDE 20 MG/1
20 TABLET ORAL DAILY
Qty: 5 TABLET | Refills: 0 | Status: SHIPPED | OUTPATIENT
Start: 2025-07-15 | End: 2025-07-20

## 2025-07-15 RX ADMIN — FUROSEMIDE 20 MG: 10 INJECTION, SOLUTION INTRAVENOUS at 21:16

## 2025-07-15 RX ADMIN — MAGNESIUM SULFATE HEPTAHYDRATE 2 G: 40 INJECTION, SOLUTION INTRAVENOUS at 20:41

## 2025-07-16 LAB
ATRIAL RATE: 84 BPM
P AXIS: 7 DEGREES
PR INTERVAL: 136 MS
QRS AXIS: 64 DEGREES
QRSD INTERVAL: 92 MS
QT INTERVAL: 398 MS
QTC INTERVAL: 471 MS
T WAVE AXIS: -19 DEGREES
VENTRICULAR RATE: 84 BPM

## 2025-07-16 PROCEDURE — 93010 ELECTROCARDIOGRAM REPORT: CPT | Performed by: INTERNAL MEDICINE
